# Patient Record
Sex: FEMALE | Race: BLACK OR AFRICAN AMERICAN | Employment: FULL TIME | URBAN - METROPOLITAN AREA
[De-identification: names, ages, dates, MRNs, and addresses within clinical notes are randomized per-mention and may not be internally consistent; named-entity substitution may affect disease eponyms.]

---

## 2017-03-23 ENCOUNTER — OFFICE VISIT (OUTPATIENT)
Dept: PAIN MANAGEMENT | Age: 42
End: 2017-03-23

## 2017-03-23 VITALS
WEIGHT: 280 LBS | BODY MASS INDEX: 41.35 KG/M2 | HEART RATE: 84 BPM | DIASTOLIC BLOOD PRESSURE: 92 MMHG | SYSTOLIC BLOOD PRESSURE: 163 MMHG

## 2017-03-23 DIAGNOSIS — M54.81 CERVICO-OCCIPITAL NEURALGIA: ICD-10-CM

## 2017-03-23 DIAGNOSIS — M25.50 POLYARTHRALGIA: Primary | ICD-10-CM

## 2017-03-23 DIAGNOSIS — M25.50 POLYARTHRALGIA: ICD-10-CM

## 2017-03-23 DIAGNOSIS — M15.9 GENERALIZED OA: ICD-10-CM

## 2017-03-23 DIAGNOSIS — F51.04 CHRONIC INSOMNIA: ICD-10-CM

## 2017-03-23 DIAGNOSIS — E11.69 DIABETES MELLITUS TYPE 2 IN OBESE (HCC): ICD-10-CM

## 2017-03-23 DIAGNOSIS — Z79.899 ENCOUNTER FOR LONG-TERM (CURRENT) USE OF MEDICATIONS: ICD-10-CM

## 2017-03-23 DIAGNOSIS — G89.21 CHRONIC PAIN DUE TO TRAUMA: ICD-10-CM

## 2017-03-23 DIAGNOSIS — E66.9 DIABETES MELLITUS TYPE 2 IN OBESE (HCC): ICD-10-CM

## 2017-03-23 DIAGNOSIS — M79.2 NEUROPATHIC PAIN: ICD-10-CM

## 2017-03-23 LAB
ALCOHOL UR POC: NORMAL
AMPHETAMINES UR POC: NEGATIVE
BARBITURATES UR POC: NEGATIVE
BENZODIAZEPINES UR POC: NEGATIVE
BUPRENORPHINE UR POC: NORMAL
CANNABINOIDS UR POC: NEGATIVE
CARISOPRODOL UR POC: NORMAL
COCAINE UR POC: NEGATIVE
FENTANYL UR POC: NORMAL
MDMA/ECSTASY UR POC: NEGATIVE
METHADONE UR POC: NEGATIVE
METHAMPHETAMINE UR POC: NEGATIVE
METHYLPHENIDATE UR POC: NEGATIVE
OPIATES UR POC: NEGATIVE
OXYCODONE UR POC: NEGATIVE
PHENCYCLIDINE UR POC: NEGATIVE
PROPOXYPHENE UR POC: NORMAL
TRAMADOL UR POC: NORMAL
TRICYCLICS UR POC: NEGATIVE

## 2017-03-23 RX ORDER — DIPHENHYDRAMINE HCL 25 MG
25 CAPSULE ORAL
COMMUNITY
End: 2018-11-08

## 2017-03-23 RX ORDER — TOPIRAMATE 100 MG/1
TABLET, FILM COATED ORAL 2 TIMES DAILY
COMMUNITY
End: 2021-10-30

## 2017-03-23 RX ORDER — DICYCLOMINE HYDROCHLORIDE 20 MG/1
20 TABLET ORAL EVERY 8 HOURS
COMMUNITY
End: 2019-12-12

## 2017-03-23 RX ORDER — VENLAFAXINE HYDROCHLORIDE 37.5 MG/1
CAPSULE, EXTENDED RELEASE ORAL
Qty: 21 CAP | Refills: 0 | Status: SHIPPED | OUTPATIENT
Start: 2017-03-23 | End: 2018-11-08

## 2017-03-23 RX ORDER — PROMETHAZINE HYDROCHLORIDE 25 MG/1
25 TABLET ORAL
COMMUNITY
End: 2019-05-22 | Stop reason: ALTCHOICE

## 2017-03-23 RX ORDER — IBUPROFEN 800 MG/1
TABLET ORAL
COMMUNITY
End: 2018-11-08

## 2017-03-23 RX ORDER — HYDROCHLOROTHIAZIDE 25 MG/1
25 TABLET ORAL DAILY
COMMUNITY
End: 2019-08-28

## 2017-03-23 RX ORDER — GLIMEPIRIDE 4 MG/1
4 TABLET ORAL
COMMUNITY
End: 2019-08-28

## 2017-03-23 RX ORDER — ACETAMINOPHEN 500 MG
500 TABLET ORAL
COMMUNITY
End: 2019-09-12

## 2017-03-23 RX ORDER — VENLAFAXINE HYDROCHLORIDE 150 MG/1
150 CAPSULE, EXTENDED RELEASE ORAL DAILY
Qty: 30 CAP | Refills: 5 | Status: SHIPPED | OUTPATIENT
Start: 2017-03-23 | End: 2017-04-22
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
## 2017-05-16 ENCOUNTER — OFFICE VISIT (OUTPATIENT)
Dept: PAIN MANAGEMENT | Age: 42
End: 2017-05-16

## 2017-05-16 VITALS
BODY MASS INDEX: 41.47 KG/M2 | HEIGHT: 69 IN | WEIGHT: 280 LBS | SYSTOLIC BLOOD PRESSURE: 142 MMHG | DIASTOLIC BLOOD PRESSURE: 88 MMHG | HEART RATE: 102 BPM

## 2017-05-16 DIAGNOSIS — Z71.89 COUNSELING AND COORDINATION OF CARE: Primary | ICD-10-CM

## 2017-05-16 DIAGNOSIS — G89.21 CHRONIC PAIN DUE TO TRAUMA: ICD-10-CM

## 2017-05-16 DIAGNOSIS — M25.50 POLYARTHRALGIA: ICD-10-CM

## 2017-05-16 DIAGNOSIS — E66.9 DIABETES MELLITUS TYPE 2 IN OBESE (HCC): ICD-10-CM

## 2017-05-16 DIAGNOSIS — M79.7 FIBROMYALGIA: Primary | ICD-10-CM

## 2017-05-16 DIAGNOSIS — Z79.899 ENCOUNTER FOR LONG-TERM (CURRENT) USE OF MEDICATIONS: ICD-10-CM

## 2017-05-16 DIAGNOSIS — E11.69 DIABETES MELLITUS TYPE 2 IN OBESE (HCC): ICD-10-CM

## 2017-05-16 DIAGNOSIS — M15.9 GENERALIZED OA: ICD-10-CM

## 2017-05-16 DIAGNOSIS — M79.2 NEUROPATHIC PAIN: ICD-10-CM

## 2017-05-16 RX ORDER — HYDROCODONE BITARTRATE AND ACETAMINOPHEN 10; 325 MG/1; MG/1
1 TABLET ORAL 2 TIMES DAILY
Qty: 60 TAB | Refills: 0 | Status: SHIPPED | OUTPATIENT
Start: 2017-06-14 | End: 2017-07-14 | Stop reason: SDUPTHER

## 2017-05-16 RX ORDER — HYDROCODONE BITARTRATE AND ACETAMINOPHEN 10; 325 MG/1; MG/1
1 TABLET ORAL 2 TIMES DAILY
Qty: 60 TAB | Refills: 0 | Status: SHIPPED | OUTPATIENT
Start: 2017-05-16 | End: 2017-05-16 | Stop reason: SDUPTHER

## 2017-05-16 RX ORDER — NORTRIPTYLINE HYDROCHLORIDE 50 MG/1
100 CAPSULE ORAL
Qty: 60 CAP | Refills: 5 | Status: SHIPPED | OUTPATIENT
Start: 2017-05-16 | End: 2017-09-11 | Stop reason: SDUPTHER

## 2017-05-16 NOTE — MR AVS SNAPSHOT
Visit Information Date & Time Provider Department Dept. Phone Encounter #  
 5/16/2017 11:20 AM Janith Councilman [address] [place] [address] for Pain Management  [id number] Follow-up Instructions Return in about 2 months (around 7/16/2017). Follow-up and Disposition History Upcoming Health Maintenance Date Due HEMOGLOBIN A1C Q6M 1973 FOOT EXAM Q1 6/13/1983 MICROALBUMIN Q1 6/13/1983 EYE EXAM RETINAL OR DILATED Q1 6/13/1983 Pneumococcal 19-64 Medium Risk (1 of 1 - PPSV23) 6/13/1992 DTaP/Tdap/Td series (1 - Tdap) 6/13/1994 LIPID PANEL Q1 3/27/2014 PAP AKA CERVICAL CYTOLOGY 4/3/2016 INFLUENZA AGE 9 TO ADULT 8/1/2017 Allergies as of 5/16/2017  Review Complete On: 5/16/2017 By: Aries Yo LPN Severity Noted Reaction Type Reactions Aspirin Medium 10/05/2016   Systemic Other (comments) Chest pain Darvocet A500 [Propoxyphene N-acetaminophen] Medium 10/05/2016   Side Effect Hives Pcn [Penicillins] Medium 10/05/2016   Side Effect Hives Sulfur Medium 10/05/2016   Side Effect Hives Current Immunizations  Never Reviewed No immunizations on file. Not reviewed this visit You Were Diagnosed With   
  
 Codes Comments Fibromyalgia    -  Primary ICD-10-CM: M79.7 ICD-9-CM: 729.1 Neuropathic pain     ICD-10-CM: M79.2 ICD-9-CM: 729.2 Encounter for long-term (current) use of medications     ICD-10-CM: Z79.899 ICD-9-CM: V58.69 Polyarthralgia     ICD-10-CM: M25.50 ICD-9-CM: 719.49 Chronic migraine     ICD-10-CM: J53.737 ICD-9-CM: 346.70 Generalized OA     ICD-10-CM: M15.9 ICD-9-CM: 715.00 Diabetes mellitus type 2 in obese (HCC)     ICD-10-CM: E11.9, E66.9 ICD-9-CM: 250.00, 278.00 Chronic pain due to trauma     ICD-10-CM: G89.21 ICD-9-CM: 338.21 Vitals BP Pulse Height(growth percentile) Weight(growth percentile) BMI OB Status 142/88 (!) 102 5' 9\" (1.753 m) 280 lb (127 kg) 41.35 kg/m2 Having regular periods Smoking Status Former Smoker Vitals History BMI and BSA Data Body Mass Index Body Surface Area  
 41.35 kg/m 2 2.49 m 2 Preferred Pharmacy Pharmacy Name Phone [facility] [address], [address] [place] [address] Avenue [phone] Your Updated Medication List  
  
   
This list is accurate as of: 5/16/17  1:04 PM.  Always use your most recent med list.  
  
  
  
  
 BENADRYL 25 mg capsule Generic drug:  diphenhydrAMINE Take 25 mg by mouth every six (6) hours as needed. BENTYL 20 mg tablet Generic drug:  dicyclomine Take 20 mg by mouth every six (6) hours. glimepiride 4 mg tablet Commonly known as:  AMARYL Take  by mouth every morning. hydroCHLOROthiazide 25 mg tablet Commonly known as:  HYDRODIURIL Take 25 mg by mouth daily. HYDROcodone-acetaminophen  mg tablet Commonly known as:  Ceil Heap Take 1 Tab by mouth two (2) times a day. Max Daily Amount: 2 Tabs. For severe pain flares Start taking on:  6/14/2017  
  
 ibuprofen 800 mg tablet Commonly known as:  MOTRIN Take  by mouth. nortriptyline 50 mg capsule Commonly known as:  PAMELOR Take 2 Caps by mouth nightly. Start with 1 cap qhs for migraines. increase to 2 qhs after 7 days if needed  
  
 promethazine 25 mg tablet Commonly known as:  PHENERGAN Take 25 mg by mouth every six (6) hours as needed for Nausea. topiramate 100 mg tablet Commonly known as:  TOPAMAX Take  by mouth two (2) times a day. TYLENOL EXTRA STRENGTH 500 mg tablet Generic drug:  acetaminophen Take  by mouth every six (6) hours as needed for Pain. venlafaxine-SR 37.5 mg capsule Commonly known as:  EFFEXOR-XR  
1 po qpm x 7 days, then 2 po qpm  
  
  
  
  
Prescriptions Printed Refills  HYDROcodone-acetaminophen (NORCO)  mg tablet 0  
 Starting on: 6/14/2017 Sig: Take 1 Tab by mouth two (2) times a day. Max Daily Amount: 2 Tabs. For severe pain flares Class: Print Route: Oral  
 nortriptyline (PAMELOR) 50 mg capsule 5 Sig: Take 2 Caps by mouth nightly. Start with 1 cap qhs for migraines. increase to 2 qhs after 7 days if needed Class: Print Route: Oral  
  
Follow-up Instructions Return in about 2 months (around 7/16/2017). Patient Instructions Plan: We will prescribe Norco 10/325 for SEVERE pain flares--MAX of two tablets per day as needed Nortriptyline 50 mg will be written for sleep and migraines. Start with 50 mg nightly for 7 days, then increase to 100 mg after if needed Poor, fragmented night sleep is a universal symptom that accompanies many chronic pain syndromes, including fibromyalgia. This is made much WORSE by daytime napping (anything longer than 20-30 minutes), as your circadian sleep/wake rhythms become dysregulated. However, daytime fatigue can be overwhelming, and it is often difficult to eliminate napping during the day. Several studies have shown improvement in fatigue with targeted, brief daytime naps. These are most effective if you follow these steps: 
1) drink a small amount of caffeine immediately before taking your nap (yes, BEFORE), such as a small cup of coffee or a cup of caffeinated tea. 2) after drinking the coffee, lay down in a comfortable spot and nap for 20 minutes (no longer than 30 minutes!). If you don't fall asleep, that is ok. Relaxing comfortably with eyes closed is acceptable. 3) get out of bed. Do not sleep longer than 20-30 minutes. The best time to nap is between noon and 2 pm, and do not nap after 3 pm if possible. Also, remember to keep very regular sleep times and wake-up times every single day. Patients that suffer chronic insomnia with daytime fatigue are very sensitive to erratic sleep times. Follow up in 3 months or sooner if needed Regular exercise and attention to emotional health and diet remain the most effective ways to treat chronic pain of all kinds You may contact me with questions or concerns through [address] [facility]! Dear Sherwin Goodman: Thank you for requesting a [employer] account. Our records indicate that you already have an active Fan[employer] account. You can access your account anytime at [email or web address] Did you know that you can access your hospital and ER discharge instructions at any time in Fan[employer]? You can also review all of your test results from your hospital stay or ER visit. Additional Information If you have questions, please visit the Frequently Asked Questions section of the [employer] website at [email or web address] Remember, [employer] is NOT to be used for urgent needs. For medical emergencies, dial 911. Now available from your iPhone and Android! Please provide this summary of care documentation to your next provider. Your primary care clinician is listed as Mac Ross. If you have any questions after today's visit, please call [phone].

## 2017-05-16 NOTE — PROGRESS NOTES
HISTORY OF PRESENT ILLNESS  Connie Bethea is a 35 y.o. female. Patient presents for follow up of chronic pain due to fibromyalgia and  She has numerous psychosocial stressors that are worsening her symptoms. These include the fact that she has custody of two grandchildren that she is raising, three mentally ill grown children who also live in her home (Bipolar, ODD, ADHD, etc). They work rarely and do not contribute financially to her bills. She suffers with chronic anxiety and panic disorder but does not have access to health care because she is uninsured and cannot afford insurance because she qualifies for Medicaid but [place] has refused to expand medicaid in [employer]. Chronic bilateral knee pain has been severe. X-rays in 2014 were normal. She had cortisone injection and PT years ago, but she states that these were of no benefit. She endorses poor sleep, profound daytime fatigue, chronic migraines, chronic abdominal pain, and depression. We discussed the pathophysiology of fibromyalgia at great length, including evidence-based treatments available. Our options are limited due to the fact that she remains uninsured. We also discussed the limits and risks of using opioids to treat chronic pain, particularly fibromyalgia. She has tried and failed Cymbalta, Lyrica, Gabapentin, Elavil, and NSAIDs. She took Effexor for a month, and reports no benefit for pain or anxiety. She understands that there is very little we have to offer her if lifestyle issues are not addressed  She has poorly controlled diabetes, but is seen at the [facility]. She takes Glimepiride 4 mg daily. Her blood sugars average 200-300. She was counseled to see them ASAP to have her medications adjusted. We discussed treatment options at length--see treatment plan below. A total of 65 minutes was spent with the patient of which more than 50% of the time was spent counseling the patient.      HPI--see above    ROS  Constitutional: Positive for malaise/fatigue. Gastrointestinal: Positive for constipation, heartburn and nausea. Musculoskeletal: Positive for back pain, joint pain (polyarticular), myalgias and neck pain. Neurological: Positive for weakness (generalized). Psychiatric/Behavioral: The patient has insomnia. All other systems reviewed and are negative. Physical Exam  Constitutional: She is oriented to person, place, and time. She appears cachectic. She has a sickly appearance. She appears distressed. HENT:   Head: Normocephalic and atraumatic. Right Ear: External ear normal.   Left Ear: External ear normal.   Nose: Nose normal.   Mouth/Throat: Oropharynx is clear and moist. No oropharyngeal exudate. Eyes: Conjunctivae and EOM are normal. Pupils are equal, round, and reactive to light. Right eye exhibits no discharge. Left eye exhibits no discharge. No scleral icterus. Neck: Muscular tenderness: spasm. Decreased range of motion present. No Brudzinski's sign and no Kernig's sign noted. No thyromegaly present. Cardiovascular: Normal rate, regular rhythm and normal heart sounds. Pulmonary/Chest: Effort normal and breath sounds normal. No respiratory distress. She has no wheezes. She has no rales. Abdominal: Soft. She exhibits no distension. There is no tenderness. There is no rebound and no guarding. Musculoskeletal: She exhibits tenderness. Right shoulder: She exhibits decreased range of motion and tenderness. Left shoulder: She exhibits decreased range of motion and tenderness. Right elbow: She exhibits decreased range of motion. Tenderness found. Left elbow: She exhibits decreased range of motion. Tenderness found. Right wrist: She exhibits decreased range of motion and tenderness. Left wrist: She exhibits decreased range of motion and tenderness. Right hip: She exhibits decreased range of motion and tenderness.         Left hip: She exhibits decreased range of motion and tenderness. Right knee: She exhibits decreased range of motion. Tenderness found. Left knee: She exhibits decreased range of motion. Tenderness found. Right ankle: She exhibits decreased range of motion. Tenderness. Left ankle: She exhibits decreased range of motion. Tenderness. Lumbar back: She exhibits decreased range of motion, tenderness, pain and spasm. Neurological: She is alert and oriented to person, place, and time. She has normal reflexes. No cranial nerve deficit or sensory deficit. She exhibits normal muscle tone. Gait abnormal. Coordination normal.   Reflex Scores:       Tricep reflexes are 2+ on the right side and 2+ on the left side. Bicep reflexes are 2+ on the right side and 2+ on the left side. Brachioradialis reflexes are 2+ on the right side and 2+ on the left side. Patellar reflexes are 2+ on the right side and 2+ on the left side. Achilles reflexes are 2+ on the right side and 2+ on the left side. Skin: Skin is warm. No rash noted. Psychiatric: Her speech is normal and behavior is normal. Judgment and thought content normal. She exhibits a depressed mood. ASSESSMENT and PLAN    ICD-10-CM ICD-9-CM    1. Fibromyalgia M79.7 729.1    2. Neuropathic pain M79.2 729.2    3. Encounter for long-term (current) use of medications Z79.899 V58.69    4. Polyarthralgia M25.50 719.49    5. Chronic migraine G43.709 346.70    6. Generalized OA M15.9 715.00    7. Diabetes mellitus type 2 in obese (HCC) E11.9 250.00     E66.9 278.00    8. Chronic pain due to trauma G89.21 338.21       Plan: We will prescribe Norco 10/325 for SEVERE pain flares--MAX of two tablets per day as needed  Nortriptyline 50 mg will be written for sleep and migraines.  Start with 50 mg nightly for 7 days, then increase to 100 mg after if needed  Poor, fragmented night sleep is a universal symptom that accompanies many chronic pain syndromes, including fibromyalgia. This is made much WORSE by daytime napping (anything longer than 20-30 minutes), as your circadian sleep/wake rhythms become dysregulated. However, daytime fatigue can be overwhelming, and it is often difficult to eliminate napping during the day. Several studies have shown improvement in fatigue with targeted, brief daytime naps. These are most effective if you follow these steps:  1) drink a small amount of caffeine immediately before taking your nap (yes, BEFORE), such as a small cup of coffee or a cup of caffeinated tea. 2) after drinking the coffee, lay down in a comfortable spot and nap for 20 minutes (no longer than 30 minutes!). If you don't fall asleep, that is ok. Relaxing comfortably with eyes closed is acceptable. 3) get out of bed. Do not sleep longer than 20-30 minutes. The best time to nap is between noon and 2 pm, and do not nap after 3 pm if possible. Also, remember to keep very regular sleep times and wake-up times every single day. Patients that suffer chronic insomnia with daytime fatigue are very sensitive to erratic sleep times.    Follow up in 3 months or sooner if needed  Regular exercise and attention to emotional health and diet remain the most effective ways to treat chronic pain of all kinds  You may contact me with questions or concerns through [address]

## 2017-05-16 NOTE — PROGRESS NOTES
Ms. Amy Ely attended the Education Class facilitated by Carla Schmidt LPN. Objectives of this class are to review practice policies, protocols and the Controlled Substances Consent and Treatment Agreement, discuss \"what if\" scenarios, introduce staff, and provide an opportunity for questions and answers. Ultimately, goals for this class are for Ms. Pennington to:    · Be educated - Learn as much as possible about her pain and related treatment, our policies and the Controlled Substances Agreement. · Be responsible - Follow the providers advice regarding treatment recommendations, medications, and prescription information. · Be confident - Better manage her pain and return to a more functional lifestyle. At least 45 minutes was spent with the patient in face-to-face contact today.

## 2017-05-16 NOTE — PATIENT INSTRUCTIONS
Plan:  We will prescribe Norco 10/325 for SEVERE pain flares--MAX of two tablets per day as needed  Nortriptyline 50 mg will be written for sleep and migraines. Start with 50 mg nightly for 7 days, then increase to 100 mg after if needed  Poor, fragmented night sleep is a universal symptom that accompanies many chronic pain syndromes, including fibromyalgia. This is made much WORSE by daytime napping (anything longer than 20-30 minutes), as your circadian sleep/wake rhythms become dysregulated. However, daytime fatigue can be overwhelming, and it is often difficult to eliminate napping during the day. Several studies have shown improvement in fatigue with targeted, brief daytime naps. These are most effective if you follow these steps:  1) drink a small amount of caffeine immediately before taking your nap (yes, BEFORE), such as a small cup of coffee or a cup of caffeinated tea. 2) after drinking the coffee, lay down in a comfortable spot and nap for 20 minutes (no longer than 30 minutes!). If you don't fall asleep, that is ok. Relaxing comfortably with eyes closed is acceptable. 3) get out of bed. Do not sleep longer than 20-30 minutes. The best time to nap is between noon and 2 pm, and do not nap after 3 pm if possible. Also, remember to keep very regular sleep times and wake-up times every single day. Patients that suffer chronic insomnia with daytime fatigue are very sensitive to erratic sleep times.    Follow up in 3 months or sooner if needed  Regular exercise and attention to emotional health and diet remain the most effective ways to treat chronic pain of all kinds  You may contact me with questions or concerns through [address]

## 2017-05-16 NOTE — PROGRESS NOTES
Nursing Notes    Patient presents to the office today in follow-up. Patient rates her pain at 8/10 on the numerical pain scale. Reviewed medications with counts as follows:    Rx Date filled Qty Dispensed Pill Count Last Dose Short   Pt is not on any controlled medications. She did stop the effexor because it was not helping                                           POC UDS was performed in office today. Any new labs or imaging since last appointment? NO    Have you been to an emergency room (ER) or urgent care clinic since your last visit? NO            Have you been hospitalized since your last visit? NO     If yes, where, when, and reason for visit? Have you seen or consulted any other health care providers outside of the [address] [place] [address]  since your last visit? NO     If yes, where, when, and reason for visit? HM deferred to pcp.

## 2017-07-14 ENCOUNTER — OFFICE VISIT (OUTPATIENT)
Dept: PAIN MANAGEMENT | Age: 42
End: 2017-07-14

## 2017-07-14 VITALS
BODY MASS INDEX: 41.35 KG/M2 | HEART RATE: 98 BPM | RESPIRATION RATE: 18 BRPM | SYSTOLIC BLOOD PRESSURE: 135 MMHG | DIASTOLIC BLOOD PRESSURE: 85 MMHG | WEIGHT: 280 LBS | TEMPERATURE: 96.9 F

## 2017-07-14 DIAGNOSIS — N83.209 HEMORRHAGIC CYST OF OVARY: ICD-10-CM

## 2017-07-14 DIAGNOSIS — Z79.899 ENCOUNTER FOR LONG-TERM (CURRENT) USE OF HIGH-RISK MEDICATION: ICD-10-CM

## 2017-07-14 DIAGNOSIS — E66.9 DIABETES MELLITUS TYPE 2 IN OBESE (HCC): ICD-10-CM

## 2017-07-14 DIAGNOSIS — M79.7 FIBROMYALGIA: ICD-10-CM

## 2017-07-14 DIAGNOSIS — E11.69 DIABETES MELLITUS TYPE 2 IN OBESE (HCC): ICD-10-CM

## 2017-07-14 DIAGNOSIS — Z79.899 ENCOUNTER FOR LONG-TERM (CURRENT) USE OF MEDICATIONS: ICD-10-CM

## 2017-07-14 DIAGNOSIS — M79.2 NEUROPATHIC PAIN: ICD-10-CM

## 2017-07-14 DIAGNOSIS — R10.32 LEFT LOWER QUADRANT PAIN: Primary | ICD-10-CM

## 2017-07-14 DIAGNOSIS — M25.50 POLYARTHRALGIA: ICD-10-CM

## 2017-07-14 DIAGNOSIS — M15.9 GENERALIZED OA: ICD-10-CM

## 2017-07-14 LAB
ALCOHOL UR POC: NORMAL
AMPHETAMINES UR POC: NEGATIVE
BARBITURATES UR POC: NEGATIVE
BENZODIAZEPINES UR POC: NEGATIVE
BUPRENORPHINE UR POC: NEGATIVE
CANNABINOIDS UR POC: NEGATIVE
CARISOPRODOL UR POC: NORMAL
COCAINE UR POC: NEGATIVE
FENTANYL UR POC: NORMAL
MDMA/ECSTASY UR POC: NEGATIVE
METHADONE UR POC: NEGATIVE
METHAMPHETAMINE UR POC: NEGATIVE
METHYLPHENIDATE UR POC: NEGATIVE
OPIATES UR POC: NORMAL
OXYCODONE UR POC: NEGATIVE
PHENCYCLIDINE UR POC: NORMAL
PROPOXYPHENE UR POC: NORMAL
TRAMADOL UR POC: NORMAL
TRICYCLICS UR POC: NEGATIVE

## 2017-07-14 RX ORDER — HYDROCODONE BITARTRATE AND ACETAMINOPHEN 10; 325 MG/1; MG/1
1 TABLET ORAL 2 TIMES DAILY
Qty: 60 TAB | Refills: 0 | Status: SHIPPED | OUTPATIENT
Start: 2017-08-12 | End: 2017-09-11 | Stop reason: SDUPTHER

## 2017-07-14 RX ORDER — INSULIN LISPRO 100 [IU]/ML
40 INJECTION, SUSPENSION SUBCUTANEOUS 2 TIMES DAILY
COMMUNITY
End: 2019-05-22 | Stop reason: ALTCHOICE

## 2017-07-14 RX ORDER — HYDROCODONE BITARTRATE AND ACETAMINOPHEN 10; 325 MG/1; MG/1
1 TABLET ORAL 2 TIMES DAILY
Qty: 60 TAB | Refills: 0 | Status: SHIPPED | OUTPATIENT
Start: 2017-07-14 | End: 2017-07-14 | Stop reason: SDUPTHER

## 2017-07-14 NOTE — MR AVS SNAPSHOT
Visit Information Date & Time Provider Department Dept. Phone Encounter #  
 7/14/2017  9:00 AM Parvin Zacarias [facility] [phone] [id number] Follow-up Instructions Return in about 2 months (around 9/14/2017). Upcoming Health Maintenance Date Due HEMOGLOBIN A1C Q6M 1973 FOOT EXAM Q1 6/13/1983 MICROALBUMIN Q1 6/13/1983 EYE EXAM RETINAL OR DILATED Q1 6/13/1983 Pneumococcal 19-64 Medium Risk (1 of 1 - PPSV23) 6/13/1992 DTaP/Tdap/Td series (1 - Tdap) 6/13/1994 LIPID PANEL Q1 3/27/2014 PAP AKA CERVICAL CYTOLOGY 4/3/2016 INFLUENZA AGE 9 TO ADULT 8/1/2017 Allergies as of 7/14/2017  Review Complete On: 7/14/2017 By: Cristopher Cagle RN Severity Noted Reaction Type Reactions Aspirin Medium 10/05/2016   Systemic Other (comments) Chest pain Darvocet A500 [Propoxyphene N-acetaminophen] Medium 10/05/2016   Side Effect Hives Pcn [Penicillins] Medium 10/05/2016   Side Effect Hives Sulfur Medium 10/05/2016   Side Effect Hives Current Immunizations  Never Reviewed No immunizations on file. Not reviewed this visit You Were Diagnosed With   
  
 Codes Comments Encounter for long-term (current) use of high-risk medication    -  Primary ICD-10-CM: H19.974 ICD-9-CM: V58.69 Fibromyalgia     ICD-10-CM: M79.7 ICD-9-CM: 729.1 Neuropathic pain     ICD-10-CM: M79.2 ICD-9-CM: 729.2 Encounter for long-term (current) use of medications     ICD-10-CM: Z79.899 ICD-9-CM: V58.69 Polyarthralgia     ICD-10-CM: M25.50 ICD-9-CM: 719.49 Chronic migraine     ICD-10-CM: R57.356 ICD-9-CM: 346.70 Generalized OA     ICD-10-CM: M15.9 ICD-9-CM: 715.00 Diabetes mellitus type 2 in obese (HCC)     ICD-10-CM: E11.9, E66.9 ICD-9-CM: 250.00, 278.00 Hemorrhagic cyst of ovary     ICD-10-CM: N83.209 ICD-9-CM: 620.2 Vitals BP Pulse Temp Resp Weight(growth percentile) LMP  
 135/85 98 96.9 °F (36.1 °C) 18 280 lb (127 kg) 07/03/2017 BMI OB Status Smoking Status 41.35 kg/m2 Having regular periods Former Smoker Vitals History BMI and BSA Data Body Mass Index Body Surface Area  
 41.35 kg/m 2 2.49 m 2 Preferred Pharmacy Pharmacy Name Phone [facility] [address] [place] [address] [phone] Your Updated Medication List  
  
   
This list is accurate as of: 7/14/17  9:46 AM.  Always use your most recent med list.  
  
  
  
  
 BENADRYL 25 mg capsule Generic drug:  diphenhydrAMINE Take 25 mg by mouth every six (6) hours as needed. BENTYL 20 mg tablet Generic drug:  dicyclomine Take 20 mg by mouth every six (6) hours. glimepiride 4 mg tablet Commonly known as:  AMARYL Take  by mouth every morning. HumaLOG Mix 75-25 100 unit/mL (75-25) injection Generic drug:  insulin lispro protamine/insulin lispro  
by SubCUTAneous route. hydroCHLOROthiazide 25 mg tablet Commonly known as:  HYDRODIURIL Take 25 mg by mouth daily. HYDROcodone-acetaminophen  mg tablet Commonly known as:  Darlene Bruno Take 1 Tab by mouth two (2) times a day. Max Daily Amount: 2 Tabs. For severe pain flares  
  
 ibuprofen 800 mg tablet Commonly known as:  MOTRIN Take  by mouth. nortriptyline 50 mg capsule Commonly known as:  PAMELOR Take 2 Caps by mouth nightly. Start with 1 cap qhs for migraines. increase to 2 qhs after 7 days if needed  
  
 promethazine 25 mg tablet Commonly known as:  PHENERGAN Take 25 mg by mouth every six (6) hours as needed for Nausea. topiramate 100 mg tablet Commonly known as:  TOPAMAX Take  by mouth two (2) times a day. TYLENOL EXTRA STRENGTH 500 mg tablet Generic drug:  acetaminophen Take  by mouth every six (6) hours as needed for Pain. venlafaxine-SR 37.5 mg capsule Commonly known as:  EFFEXOR-XR  
1 po qpm x 7 days, then 2 po qpm  
  
  
  
  
We Performed the Following AMB POC DRUG SCREEN () [ [facility]] DRUG SCREEN [[id number] Custom] Follow-up Instructions Return in about 2 months (around 9/14/2017). Patient Instructions Plan: 
Continue same medications as prescribed for chronic pain Follow up in 3 months or sooner if needed Regular exercise and attention to emotional health and diet remain the most effective ways to treat chronic pain of all kinds You may contact me with questions or concerns through [address] Introducing [facility]! Dear Juvencio Camilo: Thank you for requesting a [employer] account. Our records indicate that you already have an active Caviar account. You can access your account anytime at [email or web address] Did you know that you can access your hospital and ER discharge instructions at any time in [employer]iar? You can also review all of your test results from your hospital stay or ER visit. Additional Information If you have questions, please visit the Frequently Asked Questions section of the [employer]iar website at [email or web address] Remember, [employer]iar is NOT to be used for urgent needs. For medical emergencies, dial 911. Now available from your iPhone and Android! Please provide this summary of care documentation to your next provider. Your primary care clinician is listed as Chela Gallegos. If you have any questions after today's visit, please call [phone].

## 2017-07-14 NOTE — PROGRESS NOTES
HISTORY OF PRESENT ILLNESS  Christine Torres is a 38 y.o. female. Patient presents for follow up of chronic, severe widespread pain due to fibromyalgia, chronic low back pain, and migraines. She reports that her migraines have improved significantly since the addition of Pamelor and Norco, as has fibromyalgia and back pain. She takes 100 mg Pamelor qhs now with Norco for severe pain flares. She has not had to go to the ER in nearly two months for severe migraine, as her headache frequency has been reduced from 20+ headache days per month to less than 10 (50% drop). However, she did have to go to the ER last month due to severe LLQ abdominal pain. They discovered a small intramural uterine fibroid and a small hemorrhagic cyst in the left ovary (the likely generator of her pain). Her pain has slowly returned to baseline. The remainder of her pain generators remain constant but stable since her last visit. She is now engaged in care with a health clinic, which has recently intitated insulin therapy to manage her Type II Diabetes. Her average blood glucose has dropped from the 200s to low 100s, and she has adopted a healthier diet and is exercising more often. She has seen improvements in her energy levels and fatigue as a result. She has been referred to a GI clinic to work up her abdominal pain and digestive complaints. Medications continue to work well for pain control overall. Christine Torres is tolerating medications well, with no untoward side effects noted. She is able to stay more active with less discomfort with these current doses. The patient reports an average of 50-60% relief with this regimen. Most recent UDS and  were consistent with prescribed medications. Pill counts are appropriate.  she is informed of side effects, risks, and benefits of this regimen, and emphasizes that she derives a significant improvement in functionality and quality of life, and notes that non-opioid medications and therapies in the past have not offered significant benefit. She denies new or worsening insomnia or constipation issues. She denies any falls, injuries, or hospitalizations since the last visit. A total of 45 minutes was spent with the patient of which more than 50% of the time was spent counseling the patient. HPI--see above    ROS  Constitutional: Positive for malaise/fatigue. Gastrointestinal: Positive for constipation, heartburn and nausea. Musculoskeletal: Positive for back pain, joint pain (polyarticular), myalgias and neck pain. Neurological: Positive for weakness (generalized). Psychiatric/Behavioral: The patient has insomnia. All other systems reviewed and are negative. Physical Exam  Constitutional: She is oriented to person, place, and time. She is in better spirits today  HENT:   Head: Normocephalic and atraumatic. Neck: Muscular tenderness: spasm. Decreased range of motion present. No Brudzinski's sign and no Kernig's sign noted. No thyromegaly present. Cardiovascular: Normal rate, regular rhythm and normal heart sounds. Pulmonary/Chest: Effort normal and breath sounds normal. No respiratory distress. She has no wheezes. She has no rales. Abdominal: Soft. She exhibits no distension. Mild tenderness at LLQ without guarding or rebound  Musculoskeletal: She exhibits tenderness. 16/18 points tender to palpation       Right shoulder: She exhibits decreased range of motion and tenderness. Left shoulder: She exhibits decreased range of motion and tenderness. Right hip: She exhibits decreased range of motion and tenderness. Left hip: She exhibits decreased range of motion and tenderness. Right knee: She exhibits decreased range of motion. Tenderness found. Left knee: She exhibits decreased range of motion. Tenderness found. Right ankle: She exhibits decreased range of motion. Tenderness.         Left ankle: She exhibits decreased range of motion. Tenderness. Lumbar back: She exhibits decreased range of motion, tenderness, pain and spasm. Neurological: She is alert and oriented to person, place, and time. She has normal reflexes. No cranial nerve deficit or sensory deficit. She exhibits normal muscle tone. Gait abnormal. Coordination normal.   Skin: Skin is warm. No rash noted. multiple pedunculated hyperpigmented tags around eyes and cheeks  Psychiatric: Her speech is normal and behavior is normal. Judgment and thought content normal. She exhibits a depressed mood. ASSESSMENT and PLAN  Encounter Diagnoses     ICD-10-CM ICD-9-CM   1. Left lower quadrant pain R10.32 789.04   2. Encounter for long-term (current) use of high-risk medication Z79.899 V58.69   3. Fibromyalgia M79.7 729.1   4. Neuropathic pain M79.2 729.2   5. Encounter for long-term (current) use of medications Z79.899 V58.69   6. Polyarthralgia M25.50 719.49   7. Chronic migraine G43.709 346.70   8. Generalized OA M15.9 715.00   9. Diabetes mellitus type 2 in obese (HCC) E11.9 250.00    E66.9 278.00   10.  Hemorrhagic cyst of ovary N83.209 620.2      Plan:  Continue same medications as prescribed for chronic pain  Follow up in 2 months or sooner if needed  Regular exercise and attention to emotional health and diet remain the most effective ways to treat chronic pain of all kinds  You may contact me with questions or concerns through [address]

## 2017-07-14 NOTE — PROGRESS NOTES
Nursing Notes    Patient presents to the office today in follow-up. Patient rates her pain at 5/10 on the numerical pain scale. Reviewed medications with counts as follows:    Rx Date filled Qty Dispensed Pill Count Last Dose Short   norco 10 5/16/17 60 9 7/13/17 no         Comments: pt still has script for norco to fill 6/14/17. POC UDS was performed in office today    Any new labs or imaging since last appointment? YES, blood work for diabetic check, insulin now, abd US for abdominal pain, toradol injection given at ED    Have you been to an emergency room (ER) or urgent care clinic since your last visit? YES            Have you been hospitalized since your last visit? NO     If yes, where, when, and reason for visit? Have you seen or consulted any other health care providers outside of the [address] [place] [address]  since your last visit? YES     If yes, where, when, and reason [facility] visit[facility]    Ms. Tania Panda has a reminder for a \"due or due soon\" health maintenance. I have asked that she contact her primary care provider for follow-up on this health maintenance.

## 2017-09-11 ENCOUNTER — OFFICE VISIT (OUTPATIENT)
Dept: PAIN MANAGEMENT | Age: 42
End: 2017-09-11

## 2017-09-11 VITALS
SYSTOLIC BLOOD PRESSURE: 141 MMHG | WEIGHT: 280 LBS | TEMPERATURE: 98.8 F | BODY MASS INDEX: 41.35 KG/M2 | HEART RATE: 120 BPM | RESPIRATION RATE: 20 BRPM | DIASTOLIC BLOOD PRESSURE: 91 MMHG

## 2017-09-11 DIAGNOSIS — G56.03 BILATERAL CARPAL TUNNEL SYNDROME: Primary | ICD-10-CM

## 2017-09-11 DIAGNOSIS — Z79.899 ENCOUNTER FOR LONG-TERM (CURRENT) USE OF HIGH-RISK MEDICATION: ICD-10-CM

## 2017-09-11 DIAGNOSIS — F51.04 CHRONIC INSOMNIA: ICD-10-CM

## 2017-09-11 DIAGNOSIS — M15.9 GENERALIZED OA: ICD-10-CM

## 2017-09-11 DIAGNOSIS — M25.50 POLYARTHRALGIA: ICD-10-CM

## 2017-09-11 DIAGNOSIS — R10.32 LEFT LOWER QUADRANT PAIN: ICD-10-CM

## 2017-09-11 DIAGNOSIS — Z79.899 ENCOUNTER FOR LONG-TERM (CURRENT) USE OF MEDICATIONS: ICD-10-CM

## 2017-09-11 DIAGNOSIS — M79.7 FIBROMYALGIA: ICD-10-CM

## 2017-09-11 DIAGNOSIS — M79.2 NEUROPATHIC PAIN: ICD-10-CM

## 2017-09-11 RX ORDER — NORTRIPTYLINE HYDROCHLORIDE 50 MG/1
100 CAPSULE ORAL
Qty: 60 CAP | Refills: 5 | Status: SHIPPED | OUTPATIENT
Start: 2017-09-11 | End: 2019-05-13

## 2017-09-11 RX ORDER — HYDROCODONE BITARTRATE AND ACETAMINOPHEN 10; 325 MG/1; MG/1
1 TABLET ORAL 2 TIMES DAILY
Qty: 60 TAB | Refills: 0 | Status: SHIPPED | OUTPATIENT
Start: 2017-10-10 | End: 2017-09-11 | Stop reason: SDUPTHER

## 2017-09-11 RX ORDER — HYDROCODONE BITARTRATE AND ACETAMINOPHEN 10; 325 MG/1; MG/1
1 TABLET ORAL 2 TIMES DAILY
Qty: 60 TAB | Refills: 0 | Status: SHIPPED | OUTPATIENT
Start: 2017-11-08 | End: 2018-01-03 | Stop reason: SDUPTHER

## 2017-09-11 NOTE — PATIENT INSTRUCTIONS
Plan:  Continue same medications as prescribed for chronic pain  Your symptoms are suspicious for bilateral carpal tunnel syndrome.  Consider trying bilateral wrist braces (can be purchased at [employer]/etc) as well as B vitamin complex (take two daily)  Follow up in 4 months or sooner if needed  Regular exercise and attention to emotional health and diet remain the most effective ways to treat chronic pain of all kinds  You may contact me with questions or concerns through [address]

## 2017-09-11 NOTE — MR AVS SNAPSHOT
Visit Information Date & Time Provider Department Dept. Phone Encounter #  
 9/11/2017  3:00 PM [facility] [phone] [id number] Follow-up Instructions Return in about 4 months (around 1/11/2018). Follow-up and Disposition History Upcoming Health Maintenance Date Due HEMOGLOBIN A1C Q6M 1973 FOOT EXAM Q1 6/13/1983 MICROALBUMIN Q1 6/13/1983 EYE EXAM RETINAL OR DILATED Q1 6/13/1983 Pneumococcal 19-64 Medium Risk (1 of 1 - PPSV23) 6/13/1992 DTaP/Tdap/Td series (1 - Tdap) 6/13/1994 LIPID PANEL Q1 3/27/2014 PAP AKA CERVICAL CYTOLOGY 4/3/2016 INFLUENZA AGE 9 TO ADULT 8/1/2017 Allergies as of 9/11/2017  Review Complete On: 9/11/2017 By: Nita Rodriguez LPN Severity Noted Reaction Type Reactions Aspirin Medium 10/05/2016   Systemic Other (comments) Chest pain Darvocet A500 [Propoxyphene N-acetaminophen] Medium 10/05/2016   Side Effect Hives Pcn [Penicillins] Medium 10/05/2016   Side Effect Hives Sulfur Medium 10/05/2016   Side Effect Hives Current Immunizations  Never Reviewed No immunizations on file. Not reviewed this visit You Were Diagnosed With   
  
 Codes Comments Bilateral carpal tunnel syndrome    -  Primary ICD-10-CM: G56.03 
ICD-9-CM: 354.0 Left lower quadrant pain     ICD-10-CM: R10.32 
ICD-9-CM: 789.04 Fibromyalgia     ICD-10-CM: M79.7 ICD-9-CM: 729.1 Encounter for long-term (current) use of medications     ICD-10-CM: Z79.899 ICD-9-CM: V58.69 Polyarthralgia     ICD-10-CM: M25.50 ICD-9-CM: 719.49 Chronic migraine     ICD-10-CM: P06.344 ICD-9-CM: 346.70 Generalized OA     ICD-10-CM: M15.9 ICD-9-CM: 715.00 Neuropathic pain     ICD-10-CM: M79.2 ICD-9-CM: 729.2 Chronic insomnia     ICD-10-CM: F51.04 
ICD-9-CM: 780.52 Vitals BP Pulse Temp Resp Weight(growth percentile) BMI (!) 141/91 (!) 120 98.8 °F (37.1 °C) 20 280 lb (127 kg) 41.35 kg/m2 OB Status Smoking Status Having regular periods Former Smoker BMI and BSA Data Body Mass Index Body Surface Area  
 41.35 kg/m 2 2.49 m 2 Preferred Pharmacy Pharmacy Name Phone [facility] [address] [place], [address] [place] [address] [phone] Your Updated Medication List  
  
   
This list is accurate as of: 9/11/17  4:03 PM.  Always use your most recent med list.  
  
  
  
  
 BENADRYL 25 mg capsule Generic drug:  diphenhydrAMINE Take 25 mg by mouth every six (6) hours as needed. BENTYL 20 mg tablet Generic drug:  dicyclomine Take 20 mg by mouth every six (6) hours. glimepiride 4 mg tablet Commonly known as:  AMARYL Take  by mouth every morning. HumaLOG Mix 75-25 100 unit/mL (75-25) injection Generic drug:  insulin lispro protamine/insulin lispro  
by SubCUTAneous route. hydroCHLOROthiazide 25 mg tablet Commonly known as:  HYDRODIURIL Take 25 mg by mouth daily. HYDROcodone-acetaminophen  mg tablet Commonly known as:  Audie Boot Take 1 Tab by mouth two (2) times a day. Max Daily Amount: 2 Tabs. For severe pain flares Start taking on:  11/8/2017  
  
 ibuprofen 800 mg tablet Commonly known as:  MOTRIN Take  by mouth. nortriptyline 50 mg capsule Commonly known as:  PAMELOR Take 2 Caps by mouth nightly. Start with 1 cap qhs for migraines. increase to 2 qhs after 7 days if needed  
  
 promethazine 25 mg tablet Commonly known as:  PHENERGAN Take 25 mg by mouth every six (6) hours as needed for Nausea. topiramate 100 mg tablet Commonly known as:  TOPAMAX Take  by mouth two (2) times a day. TYLENOL EXTRA STRENGTH 500 mg tablet Generic drug:  acetaminophen Take  by mouth every six (6) hours as needed for Pain. venlafaxine-SR 37.5 mg capsule Commonly known as:  EFFEXOR-XR  
 1 po qpm x 7 days, then 2 po qpm  
  
  
  
  
Prescriptions Printed Refills  
 nortriptyline (PAMELOR) 50 mg capsule 5 Sig: Take 2 Caps by mouth nightly. Start with 1 cap qhs for migraines. increase to 2 qhs after 7 days if needed Class: Print Route: Oral  
 HYDROcodone-acetaminophen (NORCO)  mg tablet 0 Starting on: 11/8/2017 Sig: Take 1 Tab by mouth two (2) times a day. Max Daily Amount: 2 Tabs. For severe pain flares Class: Print Route: Oral  
  
Follow-up Instructions Return in about 4 months (around 1/11/2018). Patient Instructions Plan: 
Continue same medications as prescribed for chronic pain Your symptoms are suspicious for bilateral carpal tunnel syndrome. Consider trying bilateral wrist braces (can be purchased at [employer]/etc) as well as B vitamin complex (take two daily) Follow up in 4 months or sooner if needed Regular exercise and attention to emotional health and diet remain the most effective ways to treat chronic pain of all kinds You may contact me with questions or concerns through [address] [facility]! Dear Lori Rao: Thank you for requesting a [employer] account. Our records indicate that you already have an active [employer] account. You can access your account anytime at [email or web address] Did you know that you can access your hospital and ER discharge instructions at any time in [employer]? You can also review all of your test results from your hospital stay or ER visit. Additional Information If you have questions, please visit the Frequently Asked Questions section of the [employer] website at [email or web address] Remember, [employer] is NOT to be used for urgent needs. For medical emergencies, dial 911. Now available from your iPhone and Android! Please provide this summary of care documentation to your next provider. Your primary care clinician is listed as [facility]. If you have any questions after today's visit, please call [phone].

## 2017-09-11 NOTE — PROGRESS NOTES
Nursing Notes    Patient presents to the office today in follow-up. Patient rates her pain at 6/10 on the numerical pain scale. Reviewed medications with counts as follows:    Rx Date filled Qty Dispensed Pill Count Last Dose Short   norco 10 mg 07/27/17 60 15.5 This am  no       Comments:  Patient is here today for a follow up appt today she states her pain level today is a 6     POC UDS was performed in office today    Any new labs or imaging since last appointment? NO    Have you been to an emergency room (ER) or urgent care clinic since your last visit? NO            Have you been hospitalized since your last visit? NO     If yes, where, when, and reason for visit? Have you seen or consulted any other health care providers outside of the [address] [place] [address]  since your last visit? NO     If yes, where, when, and reason for visit? Ms. Gabriella Canales has a reminder for a \"due or due soon\" health maintenance. I have asked that she contact her primary care provider for follow-up on this health maintenance.

## 2017-09-11 NOTE — PROGRESS NOTES
HISTORY OF PRESENT ILLNESS  Teresa Gonzales is a 38 y.o. female. Patient presents for follow up of chronic, severe widespread pain due to fibromyalgia, chronic low back pain, and migraines. She reports that she continues to struggle with controlling her diabetes, and her insulin has recently been increased. She also notes bothersome numbness, tinlging and  strength weakness in both hands (L>R) for the past 3-4 weeks. She has also noted more swelling of late since her insulin was increased. We discussed that her symptoms are consistent with CTS. We discussed treatment options at length--see treatment plan below. Because she remains uninsured, she cannot afford any interventions, psychical therapy, or other medications. She is hopeful to be approved from the Care Card in the coming weeks. Medications continue to work modestly well for pain control overall, although she admits that Johnanna Bolognese is not as effective as it once was. Teresa Gonzales is tolerating medications well, with no untoward side effects noted. She is able to stay more active with less discomfort with these current doses. The patient reports an average of 40% relief with this regimen. Most recent UDS and  were consistent with prescribed medications. Pill counts are appropriate. She is informed of side effects, risks, and benefits of this regimen, and emphasizes that she derives a significant improvement in functionality and quality of life, and notes that non-opioid medications and therapies in the past have not offered significant benefit. She denies new or worsening insomnia or constipation issues. She denies any falls, injuries, or hospitalizations since the last visit. HPI--see above    ROS  Constitutional: Positive for malaise/fatigue. Gastrointestinal: Positive for constipation, heartburn and nausea. Musculoskeletal: Positive for back pain, joint pain (polyarticular), myalgias and neck pain.    Neurological: Positive for weakness (generalized). Psychiatric/Behavioral: The patient has insomnia. All other systems reviewed and are negative. Physical Exam  Constitutional: She is oriented to person, place, and time. She is in better spirits today  HENT:   Head: Normocephalic and atraumatic. Neck: Muscular tenderness: spasm. Decreased range of motion present. No Brudzinski's sign and no Kernig's sign noted. No thyromegaly present. Cardiovascular: Normal rate, regular rhythm and normal heart sounds. Pulmonary/Chest: Effort normal and breath sounds normal. No respiratory distress. She has no wheezes. She has no rales. Abdominal: Soft. She exhibits no distension. Mild tenderness at LLQ without guarding or rebound  Musculoskeletal: She exhibits tenderness. 16/18 points tender to palpation       Right shoulder: She exhibits decreased range of motion and tenderness. Left shoulder: She exhibits decreased range of motion and tenderness. Right hip: She exhibits decreased range of motion and tenderness. Left hip: She exhibits decreased range of motion and tenderness. Right knee: She exhibits decreased range of motion. Tenderness found. Left knee: She exhibits decreased range of motion. Tenderness found. Right ankle: She exhibits decreased range of motion. Tenderness. Left ankle: She exhibits decreased range of motion. Tenderness. Lumbar back: She exhibits decreased range of motion, tenderness, pain and spasm. Neurological: She is alert and oriented to person, place, and time. She has normal reflexes. No cranial nerve deficit or sensory deficit. She exhibits normal muscle tone. Gait abnormal. Coordination normal. Positive Phalen's sign bilaterally. Negative Tinel's sign. No thenar atrophy or  strength weakness noted. Skin: Skin is warm. No rash noted.  multiple pedunculated hyperpigmented tags around eyes and cheeks  Psychiatric: Her speech is normal and behavior is normal. Judgment and thought content normal. She exhibits a depressed mood. ASSESSMENT and PLAN    ICD-10-CM ICD-9-CM    1. Bilateral carpal tunnel syndrome G56.03 354.0    2. Left lower quadrant pain R10.32 789.04    3. Fibromyalgia M79.7 729.1    4. Encounter for long-term (current) use of medications Z79.899 V58.69    5. Polyarthralgia M25.50 719.49    6. Chronic migraine G43.709 346.70    7. Generalized OA M15.9 715.00    8. Neuropathic pain M79.2 729.2    9. Chronic insomnia F51.04 780.52       Plan:  Continue same medications as prescribed for chronic pain  Your symptoms are suspicious for bilateral carpal tunnel syndrome.  Consider trying bilateral wrist braces (can be purchased at [employer]/etc) as well as B vitamin complex (take two daily)  Follow up in 4 months or sooner if needed  Regular exercise and attention to emotional health and diet remain the most effective ways to treat chronic pain of all kinds  You may contact me with questions or concerns through [address]

## 2017-09-12 LAB
ALCOHOL UR POC: NORMAL
AMPHETAMINES UR POC: NORMAL
BARBITURATES UR POC: NORMAL
BENZODIAZEPINES UR POC: NORMAL
BUPRENORPHINE UR POC: NORMAL
CANNABINOIDS UR POC: NORMAL
CARISOPRODOL UR POC: NORMAL
COCAINE UR POC: NORMAL
FENTANYL UR POC: NORMAL
MDMA/ECSTASY UR POC: NORMAL
METHADONE UR POC: NORMAL
METHAMPHETAMINE UR POC: NORMAL
METHYLPHENIDATE UR POC: NORMAL
OPIATES UR POC: NORMAL
OXYCODONE UR POC: NORMAL
PHENCYCLIDINE UR POC: NORMAL
PROPOXYPHENE UR POC: NORMAL
TRAMADOL UR POC: NORMAL
TRICYCLICS UR POC: NORMAL

## 2018-01-03 ENCOUNTER — OFFICE VISIT (OUTPATIENT)
Dept: PAIN MANAGEMENT | Age: 43
End: 2018-01-03

## 2018-01-03 VITALS
HEIGHT: 69 IN | TEMPERATURE: 98 F | BODY MASS INDEX: 41.47 KG/M2 | SYSTOLIC BLOOD PRESSURE: 131 MMHG | WEIGHT: 280 LBS | DIASTOLIC BLOOD PRESSURE: 83 MMHG | HEART RATE: 89 BPM | RESPIRATION RATE: 16 BRPM

## 2018-01-03 DIAGNOSIS — Z79.899 ENCOUNTER FOR LONG-TERM (CURRENT) USE OF HIGH-RISK MEDICATION: Primary | ICD-10-CM

## 2018-01-03 DIAGNOSIS — M79.7 FIBROMYALGIA: ICD-10-CM

## 2018-01-03 DIAGNOSIS — M54.41 CHRONIC BILATERAL LOW BACK PAIN WITH RIGHT-SIDED SCIATICA: ICD-10-CM

## 2018-01-03 DIAGNOSIS — M15.9 GENERALIZED OA: ICD-10-CM

## 2018-01-03 DIAGNOSIS — G89.29 CHRONIC BILATERAL LOW BACK PAIN WITH RIGHT-SIDED SCIATICA: ICD-10-CM

## 2018-01-03 DIAGNOSIS — M25.50 POLYARTHRALGIA: ICD-10-CM

## 2018-01-03 RX ORDER — HYDROCODONE BITARTRATE AND ACETAMINOPHEN 10; 325 MG/1; MG/1
1 TABLET ORAL 2 TIMES DAILY
Qty: 60 TAB | Refills: 0 | Status: SHIPPED | OUTPATIENT
Start: 2018-01-03 | End: 2018-01-03 | Stop reason: SDUPTHER

## 2018-01-03 RX ORDER — HYDROCODONE BITARTRATE AND ACETAMINOPHEN 10; 325 MG/1; MG/1
1 TABLET ORAL 2 TIMES DAILY
Qty: 60 TAB | Refills: 0 | Status: SHIPPED | OUTPATIENT
Start: 2018-03-05 | End: 2018-03-28 | Stop reason: SDUPTHER

## 2018-01-03 RX ORDER — HYDROCODONE BITARTRATE AND ACETAMINOPHEN 10; 325 MG/1; MG/1
1 TABLET ORAL 2 TIMES DAILY
Qty: 60 TAB | Refills: 0 | Status: SHIPPED | OUTPATIENT
Start: 2018-02-04 | End: 2018-03-28 | Stop reason: SDUPTHER

## 2018-01-03 RX ORDER — GABAPENTIN 300 MG/1
300 CAPSULE ORAL EVERY 8 HOURS
Qty: 90 CAP | Refills: 1 | Status: SHIPPED | OUTPATIENT
Start: 2018-02-02 | End: 2018-03-28 | Stop reason: SDUPTHER

## 2018-01-03 RX ORDER — FLUOXETINE HYDROCHLORIDE 20 MG/1
CAPSULE ORAL DAILY
COMMUNITY
End: 2018-11-08

## 2018-01-03 RX ORDER — HYDROCODONE BITARTRATE AND ACETAMINOPHEN 10; 325 MG/1; MG/1
1 TABLET ORAL 2 TIMES DAILY
Qty: 60 TAB | Refills: 0 | Status: SHIPPED | OUTPATIENT
Start: 2018-01-05 | End: 2018-03-28 | Stop reason: SDUPTHER

## 2018-01-03 RX ORDER — GABAPENTIN 300 MG/1
CAPSULE ORAL
Qty: 53 CAP | Refills: 0 | Status: SHIPPED | OUTPATIENT
Start: 2018-01-03 | End: 2018-11-08 | Stop reason: SINTOL

## 2018-01-03 NOTE — PATIENT INSTRUCTIONS
1. Continue current plan with no evidence of addiction or diversion. Stable on current medication without adverse events. 2. Refill hydrocodone 10/325 mg up to 2 times daily as needed. 3. Add gabapentin 300 mg. Begin with 1 capsule each evening ×1 week then every 12 hours for the remaining of this prescription. Second prescription written for every 8 hours. 4. Continue Pamelor. Please discuss this medication and any further refills with your PCP and/or neurologist per  5. Discussed risks of addiction, dependency, and opioid induced hyperalgesia.    6. Return to clinic in 3 months

## 2018-01-03 NOTE — MR AVS SNAPSHOT
Visit Information Date & Time Provider Department Dept. Phone Encounter #  
 1/3/2018  2:20 PM [phone] Follow-up Instructions Return in about 3 months (around 4/3/2018). Upcoming Health Maintenance Date Due HEMOGLOBIN A1C Q6M 1973 FOOT EXAM Q1 6/13/1983 MICROALBUMIN Q1 6/13/1983 EYE EXAM RETINAL OR DILATED Q1 6/13/1983 Pneumococcal 19-64 Medium Risk (1 of 1 - PPSV23) 6/13/1992 DTaP/Tdap/Td series (1 - Tdap) 6/13/1994 LIPID PANEL Q1 3/27/2014 PAP AKA CERVICAL CYTOLOGY 4/3/2016 Influenza Age 5 to Adult 8/1/2017 Allergies as of 1/3/2018  Review Complete On: 1/3/2018 By: HERBIE Mckeon Severity Noted Reaction Type Reactions Aspirin Medium 10/05/2016   Systemic Other (comments) Chest pain Darvocet A500 [Propoxyphene N-acetaminophen] Medium 10/05/2016   Side Effect Hives Pcn [Penicillins] Medium 10/05/2016   Side Effect Hives Sulfur Medium 10/05/2016   Side Effect Hives Current Immunizations  Never Reviewed No immunizations on file. Not reviewed this visit You Were Diagnosed With   
  
 Codes Comments Encounter for long-term (current) use of high-risk medication    -  Primary ICD-10-CM: R64.804 ICD-9-CM: V58.69 Chronic bilateral low back pain with right-sided sciatica     ICD-10-CM: M54.41, G89.29 ICD-9-CM: 724.2, 724.3, 338.29 Polyarthralgia     ICD-10-CM: M25.50 ICD-9-CM: 719.49 Generalized OA     ICD-10-CM: M15.9 ICD-9-CM: 715.00 Fibromyalgia     ICD-10-CM: M79.7 ICD-9-CM: 729.1 Vitals BP Pulse Temp Resp Height(growth percentile) Weight(growth percentile) 131/83 (BP 1 Location: Left arm, BP Patient Position: Sitting) 89 98 °F (36.7 °C) (Oral) 16 5' 9\" (1.753 m) 280 lb (127 kg) BMI OB Status Smoking Status 41.35 kg/m2 Having regular periods Former Smoker Vitals History BMI and BSA Data Body Mass Index Body Surface Area  
 41.35 kg/m 2 2.49 m 2 Preferred Pharmacy Pharmacy Name Phone [address] [place] [address] Avenue [phone] Your Updated Medication List  
  
   
This list is accurate as of: 1/3/18  4:21 PM.  Always use your most recent med list.  
  
  
  
  
 BENADRYL 25 mg capsule Generic drug:  diphenhydrAMINE Take 25 mg by mouth every six (6) hours as needed. BENTYL 20 mg tablet Generic drug:  dicyclomine Take 20 mg by mouth every six (6) hours. * gabapentin 300 mg capsule Commonly known as:  NEURONTIN Take 1 capsule once in the evening for one week, then 2 times a day thereafter. #53 * gabapentin 300 mg capsule Commonly known as:  NEURONTIN Take 1 Cap by mouth every eight (8) hours for 30 days. Start taking on:  2/2/2018  
  
 glimepiride 4 mg tablet Commonly known as:  AMARYL Take  by mouth every morning. HumaLOG Mix 75-25 100 unit/mL (75-25) injection Generic drug:  insulin lispro protamine/insulin lispro  
by SubCUTAneous route. hydroCHLOROthiazide 25 mg tablet Commonly known as:  HYDRODIURIL Take 25 mg by mouth daily. * HYDROcodone-acetaminophen  mg tablet Commonly known as:  [place] Take 1 Tab by mouth two (2) times a day. Max Daily Amount: 2 Tabs. For severe pain flares * HYDROcodone-acetaminophen  mg tablet Commonly known as:  [place] Take 1 Tab by mouth two (2) times a day. Max Daily Amount: 2 Tabs. For severe pain flares * HYDROcodone-acetaminophen  mg tablet Commonly known as:  [place] Take 1 Tab by mouth two (2) times a day. Max Daily Amount: 2 Tabs. For severe pain flares  
  
 ibuprofen 800 mg tablet Commonly known as:  MOTRIN Take  by mouth. nortriptyline 50 mg capsule Commonly known as:  PAMELOR Take 2 Caps by mouth nightly. Start with 1 cap qhs for migraines.  increase to 2 qhs after 7 days if needed  
  
 promethazine 25 mg tablet Commonly known as:  PHENERGAN Take 25 mg by mouth every six (6) hours as needed for Nausea. PROzac 20 mg capsule Generic drug:  FLUoxetine Take  by mouth daily. topiramate 100 mg tablet Commonly known as:  TOPAMAX Take  by mouth two (2) times a day. TYLENOL EXTRA STRENGTH 500 mg tablet Generic drug:  acetaminophen Take  by mouth every six (6) hours as needed for Pain. venlafaxine-SR 37.5 mg capsule Commonly known as:  EFFEXOR-XR  
1 po qpm x 7 days, then 2 po qpm  
  
 * Notice: This list has 5 medication(s) that are the same as other medications prescribed for you. Read the directions carefully, and ask your doctor or other care provider to review them with you. Prescriptions Printed Refills HYDROcodone-acetaminophen (NORCO)  mg tablet 0 Sig: Take 1 Tab by mouth two (2) times a day. Max Daily Amount: 2 Tabs. For severe pain flares Class: Print Route: Oral  
 HYDROcodone-acetaminophen (NORCO)  mg tablet 0 Sig: Take 1 Tab by mouth two (2) times a day. Max Daily Amount: 2 Tabs. For severe pain flares Class: Print Route: Oral  
 HYDROcodone-acetaminophen (NORCO)  mg tablet 0 Sig: Take 1 Tab by mouth two (2) times a day. Max Daily Amount: 2 Tabs. For severe pain flares Class: Print Route: Oral  
  
Prescriptions Sent to Pharmacy Refills  
 gabapentin (NEURONTIN) 300 mg capsule 0 Sig: Take 1 capsule once in the evening for one week, then 2 times a day thereafter. #53 Class: Normal  
 Pharmacy: [facility] [address] COLLINS [address] Ph #: [phone]  
 gabapentin (NEURONTIN) 300 mg capsule 1 Starting on: 2/2/2018 Sig: Take 1 Cap by mouth every eight (8) hours for 30 days. Class: Normal  
 Pharmacy: [facility] [address] MICA COLLINS [address] Ph #: [phone]  Route: Oral  
  
 We Performed the Following AMB POC DRUG SCREEN () [ [facility]] DRUG SCREEN [[id number] Custom] Follow-up Instructions Return in about 3 months (around 4/3/2018). Patient Instructions 1. Continue current plan with no evidence of addiction or diversion. Stable on current medication without adverse events. 2. Refill hydrocodone 10/325 mg up to 2 times daily as needed. 3. Add gabapentin 300 mg. Begin with 1 capsule each evening ×1 week then every 12 hours for the remaining of this prescription. Second prescription written for every 8 hours. 4. Continue Pamelor. Please discuss this medication and any further refills with your PCP and/or neurologist per 5. Discussed risks of addiction, dependency, and opioid induced hyperalgesia. 6. Return to clinic in 3 months Introducing [facility]! Dear Sherron Miller: Thank you for requesting a [employer] account. Our records indicate that you already have an active [employer] account. You can access your account anytime at [email or web address] Did you know that you can access your hospital and ER discharge instructions at any time in [employer]? You can also review all of your test results from your hospital stay or ER visit. Additional Information If you have questions, please visit the Frequently Asked Questions section of the [employer] website at [email or web address] Remember, [employer] is NOT to be used for urgent needs. For medical emergencies, dial 911. Now available from your iPhone and Android! Please provide this summary of care documentation to your next provider. Your primary care clinician is listed as Mathew Herrera. If you have any questions after today's visit, please call [phone].

## 2018-01-03 NOTE — PROGRESS NOTES
Nursing Notes    Patient presents to the office today in follow-up. Patient rates her pain at 6/10 on the numerical pain scale. Reviewed medications with counts as follows:    Rx Date filled Qty Dispensed Pill Count Last Dose Short   HYDROCODONE  W/ APAP  MG TAB 12/6/17 60 41.5 TODAY NO                                        Comments:     POC UDS was performed in office today per verbal order and correct read back from provider. Any new labs or imaging since last appointment? NO     Have you been to an emergency room (ER) or urgent care clinic since your last visit? NO            Have you been hospitalized since your last visit? NO     If yes, where, when, and reason for visit? Have you seen or consulted any other health care providers outside of the [address] [place] [address]  since your last visit? YES, PCP. If yes, where, when, and reason for visit? HM deferred to pcp.

## 2018-01-03 NOTE — PROGRESS NOTES
HISTORY OF PRESENT ILLNESS  Oz Montoya is a 38 y.o. female    HPI: Ms. Dayna Howard  returns today for f/u of chronic, severe widespread pain due to fibromyalgia and chronic low back pain. Today is my first visit with Ms. Dayna Howard. She continues with pain unchanged since last visit. She has been doing well with her current treatment plan which has been offering significant pain control. She does report some uncontrolled pain associated with her fibromyalgia. We discussed several different options in the office today. She remembers trying gabapentin and Lyrica in the past.  She says that she cannot afford Lyrica but does not remember if gabapentin was effective or not. She has agreed to trial gabapentin once again. We will start with a tapering dose tapering as tolerated. I will have her follow-up in 3 months or sooner if needed     Medications are helping with pain control and quality of life. Her pain is 4/10 with medication and 6/10 without. Pt describes pain as crushing. Aggravating factors include walking and bending. Relieved with rest, medication, and avoiding painful activities. Current treatment is helping to improve general activity, mood, walking, sleep, enjoyment of life    Ms. Dayna Howard is tolerating medications well, with no side effects noted. She is able to stay more active with less discomfort with these current doses. The patient reports an average of 10-20% pain relief with current treatment/medications. Pill counts are appropriate. She is informed of side effects, risks, and benefits of this regimen, and emphasizes that she derives a significant improvement in functionality and quality of life, and notes that non-opioid medications and therapies in the past have not offered significant benefit. She  is otherwise doing well with no other complaints today. She denies any adverse events including nausea, vomiting, dizziness, increased constipation, hallucinations, or seizures.      POC UDS today. Confirmation pending. Allergies   Allergen Reactions    Aspirin Other (comments)     Chest pain    Darvocet A500 [Propoxyphene N-Acetaminophen] Hives    Pcn [Penicillins] Hives    Sulfur Hives       Past Surgical History:   Procedure Laterality Date    HX CHOLECYSTECTOMY      HX GYN  1990    c section    HX ORTHOPAEDIC  1996    pelvic repair         Review of Systems   Constitutional: Negative for chills, fever and weight loss. HENT: Negative for congestion and sore throat. Eyes: Negative for blurred vision and double vision. Respiratory: Negative for cough, shortness of breath and wheezing. Cardiovascular: Negative for chest pain and palpitations. Gastrointestinal: Negative for abdominal pain, constipation, diarrhea, heartburn, nausea and vomiting. Genitourinary: Negative. Musculoskeletal: Positive for back pain and joint pain. Negative for neck pain. Neurological: Positive for headaches. Negative for dizziness, seizures and loss of consciousness. Endo/Heme/Allergies: Does not bruise/bleed easily. Psychiatric/Behavioral: Negative for depression. The patient is not nervous/anxious and does not have insomnia. Physical Exam   Constitutional: She is oriented to person, place, and time and well-developed, well-nourished, and in no distress. No distress. HENT:   Head: Normocephalic and atraumatic. Eyes: EOM are normal.   Pulmonary/Chest: Effort normal.   Neurological: She is alert and oriented to person, place, and time. Skin: Skin is warm and dry. No rash noted. She is not diaphoretic. No erythema. Psychiatric: Mood, memory, affect and judgment normal.   Nursing note and vitals reviewed. ASSESSMENT:    1. Encounter for long-term (current) use of high-risk medication    2. Chronic bilateral low back pain with right-sided sciatica    3. Polyarthralgia    4. Generalized OA    5.  Fibromyalgia           [place] Prescription Monitoring Program was reviewed which does not demonstrate aberrancies and/or inconsistencies with regard to the historical prescribing of controlled medications to this patient by other providers. PLAN / Pt Instructions:  1. Continue current plan with no evidence of addiction or diversion. Stable on current medication without adverse events. 2. Refill hydrocodone 10/325 mg up to 2 times daily as needed. 3. Add gabapentin 300 mg. Begin with 1 capsule each evening ×1 week then every 12 hours for the remaining of this prescription. Second prescription written for every 8 hours. 4. Continue Pamelor. Please discuss this medication and any further refills with your PCP and/or neurologist per  5. Discussed risks of addiction, dependency, and opioid induced hyperalgesia. 6. Return to clinic in 3 months    Medications Ordered Today   Medications    DISCONTD: HYDROcodone-acetaminophen (NORCO)  mg tablet     Sig: Take 1 Tab by mouth two (2) times a day. Max Daily Amount: 2 Tabs. For severe pain flares     Dispense:  60 Tab     Refill:  0    DISCONTD: HYDROcodone-acetaminophen (NORCO)  mg tablet     Sig: Take 1 Tab by mouth two (2) times a day. Max Daily Amount: 2 Tabs. For severe pain flares     Dispense:  60 Tab     Refill:  0    DISCONTD: HYDROcodone-acetaminophen (NORCO)  mg tablet     Sig: Take 1 Tab by mouth two (2) times a day. Max Daily Amount: 2 Tabs. For severe pain flares     Dispense:  60 Tab     Refill:  0    gabapentin (NEURONTIN) 300 mg capsule     Sig: Take 1 capsule once in the evening for one week, then 2 times a day thereafter. #53     Dispense:  53 Cap     Refill:  0    gabapentin (NEURONTIN) 300 mg capsule     Sig: Take 1 Cap by mouth every eight (8) hours for 30 days. Dispense:  90 Cap     Refill:  1    HYDROcodone-acetaminophen (NORCO)  mg tablet     Sig: Take 1 Tab by mouth two (2) times a day. Max Daily Amount: 2 Tabs.  For severe pain flares     Dispense:  60 Tab     Refill:  0    HYDROcodone-acetaminophen (NORCO)  mg tablet     Sig: Take 1 Tab by mouth two (2) times a day. Max Daily Amount: 2 Tabs. For severe pain flares     Dispense:  60 Tab     Refill:  0    HYDROcodone-acetaminophen (NORCO)  mg tablet     Sig: Take 1 Tab by mouth two (2) times a day. Max Daily Amount: 2 Tabs. For severe pain flares     Dispense:  60 Tab     Refill:  0           DISPOSITION     Pain medications are prescribed with the objective of pain relief and improved physical and psychosocial function in this patient.  Pain Meds and Quality Of Life have been reviewed. Nonpharmacologic therapy and non-opioid pharmacologic therapy have been considered. Opioid therapy is only prescribed if the expected benefits are anticipated to outweigh risks.  Counseled patient on proper use of prescribed medications.  Reviewed with patient self-help tools, home exercise, and lifestyle changes to assist the patient in self-management of symptoms.  Reviewed with patient the treatment plan, goals of treatment plan, and limitations of treatment plan, to include the potential for side effects from medications and procedures. If side effects occur, it is the responsibility of the patient to inform the clinic so that a change in the treatment plan can be made in a safe manner. The patient is advised that stopping prescribed medication may cause an increase in symptoms and possible medication withdrawal symptoms. The patient is informed an emergency room evaluation may be necessary if this occurs. Spent 25 minutes with patient today which more than 50% of that time was spent on counseling and coordination of care. Johnny Wise 1/3/2018        Note: Please excuse any typographical errors. Voice recognition software was used for this note and may cause mistakes.

## 2018-03-28 ENCOUNTER — OFFICE VISIT (OUTPATIENT)
Dept: PAIN MANAGEMENT | Age: 43
End: 2018-03-28

## 2018-03-28 VITALS
DIASTOLIC BLOOD PRESSURE: 82 MMHG | SYSTOLIC BLOOD PRESSURE: 124 MMHG | HEART RATE: 78 BPM | BODY MASS INDEX: 41.47 KG/M2 | HEIGHT: 69 IN | WEIGHT: 280 LBS | TEMPERATURE: 97.7 F | RESPIRATION RATE: 16 BRPM

## 2018-03-28 DIAGNOSIS — M54.41 CHRONIC BILATERAL LOW BACK PAIN WITH RIGHT-SIDED SCIATICA: ICD-10-CM

## 2018-03-28 DIAGNOSIS — M79.2 NEUROPATHIC PAIN: ICD-10-CM

## 2018-03-28 DIAGNOSIS — M79.7 FIBROMYALGIA: ICD-10-CM

## 2018-03-28 DIAGNOSIS — M15.9 GENERALIZED OA: ICD-10-CM

## 2018-03-28 DIAGNOSIS — G89.29 CHRONIC BILATERAL LOW BACK PAIN WITH RIGHT-SIDED SCIATICA: ICD-10-CM

## 2018-03-28 DIAGNOSIS — M25.50 POLYARTHRALGIA: ICD-10-CM

## 2018-03-28 RX ORDER — HYDROCODONE BITARTRATE AND ACETAMINOPHEN 10; 325 MG/1; MG/1
1 TABLET ORAL 2 TIMES DAILY
Qty: 60 TAB | Refills: 0 | Status: SHIPPED | OUTPATIENT
Start: 2018-03-28 | End: 2018-06-28 | Stop reason: SDUPTHER

## 2018-03-28 RX ORDER — HYDROCODONE BITARTRATE AND ACETAMINOPHEN 10; 325 MG/1; MG/1
1 TABLET ORAL 2 TIMES DAILY
Qty: 60 TAB | Refills: 0 | Status: SHIPPED | OUTPATIENT
Start: 2018-05-26 | End: 2018-06-28 | Stop reason: SDUPTHER

## 2018-03-28 RX ORDER — GABAPENTIN 300 MG/1
300 CAPSULE ORAL EVERY 12 HOURS
Qty: 60 CAP | Refills: 2 | Status: SHIPPED | OUTPATIENT
Start: 2018-03-28 | End: 2018-04-27

## 2018-03-28 RX ORDER — HYDROCODONE BITARTRATE AND ACETAMINOPHEN 10; 325 MG/1; MG/1
1 TABLET ORAL 2 TIMES DAILY
Qty: 60 TAB | Refills: 0 | Status: SHIPPED | OUTPATIENT
Start: 2018-04-27 | End: 2018-06-28 | Stop reason: SDUPTHER

## 2018-03-28 NOTE — PROGRESS NOTES
HISTORY OF PRESENT ILLNESS  Jaden Burr is a 38 y.o. female    HPI: Ms. Sherren Cruise  returns today for f/u of chronic, severe widespread pain due to fibromyalgia and chronic low back pain. Ms. Sherren Cruise continues unchanged since last visit. She does report some mild improvement so far with gabapentin. We started gabapentin last visit tapering up to every 8 hours however she is only been able to tolerate every 12 hours. We will continue with this dosage for now. She has not followed up with her PCP regarding her Pamelor at this time. She is currently scheduled for appointment soon and will discuss this medication at that time. She is otherwise doing well with no other complaints. I will have her follow-up in 3 months for further evaluation and recommendation. Medications are helping with pain control and quality of life. Her pain is 4/10 with medication and 6/10 without. Pt describes pain as crushing. Aggravating factors include walking and bending. Relieved with rest, medication, and avoiding painful activities. Current treatment is helping to improve general activity, mood, walking, sleep, enjoyment of life    Ms. Sherren Cruise is tolerating medications well, with no side effects noted. She is able to stay more active with less discomfort with these current doses. The patient reports an average of 10-20% pain relief with current treatment/medications. Pill counts are appropriate. She is informed of side effects, risks, and benefits of this regimen, and emphasizes that she derives a significant improvement in functionality and quality of life, and notes that non-opioid medications and therapies in the past have not offered significant benefit. She  is otherwise doing well with no other complaints today. She denies any adverse events including nausea, vomiting, dizziness, increased constipation, hallucinations, or seizures.         Allergies   Allergen Reactions    Aspirin Other (comments)     Chest pain    Darvocet A500 [Propoxyphene N-Acetaminophen] Hives    Pcn [Penicillins] Hives    Sulfur Hives       Past Surgical History:   Procedure Laterality Date    HX CHOLECYSTECTOMY      HX GYN  1990    c section    HX ORTHOPAEDIC  1996    pelvic repair         Review of Systems   Constitutional: Negative for chills, fever and weight loss. HENT: Negative for congestion and sore throat. Eyes: Negative for blurred vision and double vision. Respiratory: Negative for cough, shortness of breath and wheezing. Cardiovascular: Negative for chest pain and palpitations. Gastrointestinal: Negative for abdominal pain, constipation, diarrhea, heartburn, nausea and vomiting. Genitourinary: Negative. Musculoskeletal: Positive for back pain and joint pain. Negative for neck pain. Neurological: Positive for headaches. Negative for dizziness, seizures and loss of consciousness. Endo/Heme/Allergies: Does not bruise/bleed easily. Psychiatric/Behavioral: Negative for depression. The patient is not nervous/anxious and does not have insomnia. Physical Exam   Constitutional: She is oriented to person, place, and time and well-developed, well-nourished, and in no distress. No distress. HENT:   Head: Normocephalic and atraumatic. Eyes: EOM are normal.   Pulmonary/Chest: Effort normal.   Neurological: She is alert and oriented to person, place, and time. Skin: Skin is warm and dry. No rash noted. She is not diaphoretic. No erythema. Psychiatric: Mood, memory, affect and judgment normal.   Nursing note and vitals reviewed. ASSESSMENT:    1. Chronic bilateral low back pain with right-sided sciatica    2. Polyarthralgia    3. Generalized OA    4. Neuropathic pain    5.  Fibromyalgia           [place] Prescription Monitoring Program was reviewed which does not demonstrate aberrancies and/or inconsistencies with regard to the historical prescribing of controlled medications to this patient by other providers. PLAN / Pt Instructions:  1. Continue current plan with no evidence of addiction or diversion. Stable on current medication without adverse events. 2. Refill hydrocodone 10/325 mg up to 2 times daily as needed. 3. Refill gabapentin 300 mg every 12 hours. 2 refills  4. Continue Pamelor. Please discuss this medication and any further refills with your PCP and/or neurologist   5. Discussed risks of addiction, dependency, and opioid induced hyperalgesia. 6. Return to clinic in 3 months    Medications Ordered Today   Medications    HYDROcodone-acetaminophen (NORCO)  mg tablet     Sig: Take 1 Tab by mouth two (2) times a day. Max Daily Amount: 2 Tabs. For severe pain flares     Dispense:  60 Tab     Refill:  0    HYDROcodone-acetaminophen (NORCO)  mg tablet     Sig: Take 1 Tab by mouth two (2) times a day. Max Daily Amount: 2 Tabs. For severe pain flares     Dispense:  60 Tab     Refill:  0    HYDROcodone-acetaminophen (NORCO)  mg tablet     Sig: Take 1 Tab by mouth two (2) times a day. Max Daily Amount: 2 Tabs. For severe pain flares     Dispense:  60 Tab     Refill:  0    gabapentin (NEURONTIN) 300 mg capsule     Sig: Take 1 Cap by mouth every twelve (12) hours for 30 days. Dispense:  60 Cap     Refill:  2           DISPOSITION     Pain medications are prescribed with the objective of pain relief and improved physical and psychosocial function in this patient.  Pain Meds and Quality Of Life have been reviewed. Nonpharmacologic therapy and non-opioid pharmacologic therapy have been considered. Opioid therapy is only prescribed if the expected benefits are anticipated to outweigh risks.  Counseled patient on proper use of prescribed medications.  Reviewed with patient self-help tools, home exercise, and lifestyle changes to assist the patient in self-management of symptoms.    Reviewed with patient the treatment plan, goals of treatment plan, and limitations of treatment plan, to include the potential for side effects from medications and procedures. If side effects occur, it is the responsibility of the patient to inform the clinic so that a change in the treatment plan can be made in a safe manner. The patient is advised that stopping prescribed medication may cause an increase in symptoms and possible medication withdrawal symptoms. The patient is informed an emergency room evaluation may be necessary if this occurs. Spent 25 minutes with patient today which more than 50% of that time was spent on counseling and coordination of care. Johnny Loredo 3/28/2018        Note: Please excuse any typographical errors. Voice recognition software was used for this note and may cause mistakes.

## 2018-03-28 NOTE — PROGRESS NOTES
Nursing Notes    Patient presents to the office today in follow-up. Patient rates her pain at 3/10 on the numerical pain scale. Reviewed medications with counts as follows:    Rx Date filled Qty Dispensed Pill Count Last Dose Short   Norco 10 mg 02/27/18 60 25.5 today no   . Comments: Patient is here today for a follow up appt today she states per pain level today is a d3  She states she goes to [facility] for her diabetes management     POC UDS was not performed in office today    Any new labs or imaging since last appointment? NO    Have you been to an emergency room (ER) or urgent care clinic since your last visit? NO            Have you been hospitalized since your last visit? NO     If yes, where, when, and reason for visit? Have you seen or consulted any other health care providers outside of the [address] [place] [address]  since your last visit? [facility]      If yes, where, when, and reason for visit? HM deferred to pcp.

## 2018-03-28 NOTE — MR AVS SNAPSHOT
[address] [facility] [address] 
[phone] Patient: Steve Horn MRN: [id number] Visit Information Date & Time Provider Department Dept. Phone Encounter #  
 3/28/2018 12:40 PM Bia Duncan [address] [facility] [phone] Follow-up Instructions Return in about 3 months (around 6/28/2018). Your Appointments 6/28/2018 12:20 PM  
Follow Up with HERBIE Duncan [address] [facility] (LUIS ENRIQUE SCHEDULING) Appt Note: Return in about 3 months (around 6/28/2018). [address] [facility] [address]  
[phone] [facility] [id number] Upcoming Health Maintenance Date Due HEMOGLOBIN A1C Q6M 1973 FOOT EXAM Q1 6/13/1983 MICROALBUMIN Q1 6/13/1983 EYE EXAM RETINAL OR DILATED Q1 6/13/1983 Pneumococcal 19-64 Medium Risk (1 of 1 - PPSV23) 6/13/1992 DTaP/Tdap/Td series (1 - Tdap) 6/13/1994 LIPID PANEL Q1 3/27/2014 PAP AKA CERVICAL CYTOLOGY 4/3/2016 Influenza Age 5 to Adult 8/1/2017 Allergies as of 3/28/2018  Review Complete On: 3/28/2018 By: HERBIE Duncan Severity Noted Reaction Type Reactions Aspirin Medium 10/05/2016   Systemic Other (comments) Chest pain Darvocet A500 [Propoxyphene N-acetaminophen] Medium 10/05/2016   Side Effect Hives Pcn [Penicillins] Medium 10/05/2016   Side Effect Hives Sulfur Medium 10/05/2016   Side Effect Hives Current Immunizations  Never Reviewed No immunizations on file. Not reviewed this visit You Were Diagnosed With   
  
 Codes Comments Chronic bilateral low back pain with right-sided sciatica     ICD-10-CM: M54.41, G89.29 ICD-9-CM: 724.2, 724.3, 338.29 Polyarthralgia     ICD-10-CM: M25.50 ICD-9-CM: 719.49 Generalized OA     ICD-10-CM: M15.9 ICD-9-CM: 715.00   
 Neuropathic pain     ICD-10-CM: M79.2 ICD-9-CM: 729.2 Fibromyalgia     ICD-10-CM: M79.7 ICD-9-CM: 729.1 Vitals BP Pulse Temp Resp Height(growth percentile) Weight(growth percentile) 124/82 (BP 1 Location: Right arm, BP Patient Position: Sitting) 78 97.7 °F (36.5 °C) 16 5' 9\" (1.753 m) 280 lb (127 kg) BMI OB Status Smoking Status 41.35 kg/m2 Having regular periods Former Smoker BMI and BSA Data Body Mass Index Body Surface Area  
 41.35 kg/m 2 2.49 m 2 Preferred Pharmacy Pharmacy Name Phone [address] [phone] Your Updated Medication List  
  
   
This list is accurate as of 3/28/18  1:02 PM.  Always use your most recent med list.  
  
  
  
  
 BENADRYL 25 mg capsule Generic drug:  diphenhydrAMINE Take 25 mg by mouth every six (6) hours as needed. BENTYL 20 mg tablet Generic drug:  dicyclomine Take 20 mg by mouth every six (6) hours. * gabapentin 300 mg capsule Commonly known as:  NEURONTIN Take 1 capsule once in the evening for one week, then 2 times a day thereafter. #53 * gabapentin 300 mg capsule Commonly known as:  NEURONTIN Take 1 Cap by mouth every twelve (12) hours for 30 days. glimepiride 4 mg tablet Commonly known as:  AMARYL Take  by mouth every morning. HumaLOG Mix 75-25(U-100)Insuln 100 unit/mL (75-25) injection Generic drug:  insulin lispro protamine/insulin lispro  
by SubCUTAneous route. hydroCHLOROthiazide 25 mg tablet Commonly known as:  HYDRODIURIL Take 25 mg by mouth daily. * HYDROcodone-acetaminophen  mg tablet Commonly known as:  Mertie Burson Take 1 Tab by mouth two (2) times a day. Max Daily Amount: 2 Tabs. For severe pain flares * HYDROcodone-acetaminophen  mg tablet Commonly known as:  Mertie Burson Take 1 Tab by mouth two (2) times a day. Max Daily Amount: 2 Tabs. For severe pain flares Start taking on:  4/27/2018  
  
 * HYDROcodone-acetaminophen  mg tablet Commonly known as:  Jt Theodoravius Take 1 Tab by mouth two (2) times a day. Max Daily Amount: 2 Tabs. For severe pain flares Start taking on:  5/26/2018  
  
 ibuprofen 800 mg tablet Commonly known as:  MOTRIN Take  by mouth. nortriptyline 50 mg capsule Commonly known as:  PAMELOR Take 2 Caps by mouth nightly. Start with 1 cap qhs for migraines. increase to 2 qhs after 7 days if needed  
  
 promethazine 25 mg tablet Commonly known as:  PHENERGAN Take 25 mg by mouth every six (6) hours as needed for Nausea. PROzac 20 mg capsule Generic drug:  FLUoxetine Take  by mouth daily. topiramate 100 mg tablet Commonly known as:  TOPAMAX Take  by mouth two (2) times a day. TYLENOL EXTRA STRENGTH 500 mg tablet Generic drug:  acetaminophen Take  by mouth every six (6) hours as needed for Pain. venlafaxine-SR 37.5 mg capsule Commonly known as:  EFFEXOR-XR  
1 po qpm x 7 days, then 2 po qpm  
  
 * Notice: This list has 5 medication(s) that are the same as other medications prescribed for you. Read the directions carefully, and ask your doctor or other care provider to review them with you. Prescriptions Printed Refills HYDROcodone-acetaminophen (NORCO)  mg tablet 0 Sig: Take 1 Tab by mouth two (2) times a day. Max Daily Amount: 2 Tabs. For severe pain flares Class: Print Route: Oral  
 HYDROcodone-acetaminophen (NORCO)  mg tablet 0 Starting on: 4/27/2018 Sig: Take 1 Tab by mouth two (2) times a day. Max Daily Amount: 2 Tabs. For severe pain flares Class: Print Route: Oral  
 HYDROcodone-acetaminophen (NORCO)  mg tablet 0 Starting on: 5/26/2018 Sig: Take 1 Tab by mouth two (2) times a day. Max Daily Amount: 2 Tabs. For severe pain flares Class: Print Route: Oral  
  
Prescriptions Sent to Pharmacy Refills gabapentin (NEURONTIN) 300 mg capsule 2 Sig: Take 1 Cap by mouth every twelve (12) hours for 30 days. Class: Normal  
 Pharmacy: [address] [place], [address] [place] [address] #: [phone] Route: Oral  
  
Follow-up Instructions Return in about 3 months (around 6/28/2018). Patient Instructions 1. Continue current plan with no evidence of addiction or diversion. Stable on current medication without adverse events. 2. Refill hydrocodone 10/325 mg up to 2 times daily as needed. 3. Refill gabapentin 300 mg every 12 hours. 2 refills 4. Continue Pamelor. Please discuss this medication and any further refills with your PCP and/or neurologist  
5. Discussed risks of addiction, dependency, and opioid induced hyperalgesia. 6. Return to clinic in 3 months Introducing [facility]! Dear Mignon Batista: Thank you for requesting a [email or web address] account. Our records indicate that you already have an active [email or web address] account. You can access your account anytime at [email or web address] Did you know that you can access your hospital and ER discharge instructions at any time in [employer]? You can also review all of your test results from your hospital stay or ER visit. Additional Information If you have questions, please visit the Frequently Asked Questions section of the [email or web address] website at [email or web address] Remember, [email or web address] is NOT to be used for urgent needs. For medical emergencies, dial 911. Now available from your iPhone and Android! Please provide this summary of care documentation to your next provider. Your primary care clinician is listed as Fernandez Cole. If you have any questions after today's visit, please call [phone].

## 2018-03-28 NOTE — PATIENT INSTRUCTIONS
1. Continue current plan with no evidence of addiction or diversion. Stable on current medication without adverse events. 2. Refill hydrocodone 10/325 mg up to 2 times daily as needed. 3. Refill gabapentin 300 mg every 12 hours. 2 refills  4. Continue Pamelor. Please discuss this medication and any further refills with your PCP and/or neurologist   5. Discussed risks of addiction, dependency, and opioid induced hyperalgesia.    6. Return to clinic in 3 months

## 2018-06-28 ENCOUNTER — OFFICE VISIT (OUTPATIENT)
Dept: PAIN MANAGEMENT | Age: 43
End: 2018-06-28

## 2018-06-28 VITALS
DIASTOLIC BLOOD PRESSURE: 96 MMHG | RESPIRATION RATE: 16 BRPM | HEIGHT: 69 IN | TEMPERATURE: 97.1 F | WEIGHT: 280 LBS | SYSTOLIC BLOOD PRESSURE: 131 MMHG | BODY MASS INDEX: 41.47 KG/M2 | HEART RATE: 88 BPM

## 2018-06-28 DIAGNOSIS — M79.7 FIBROMYALGIA: ICD-10-CM

## 2018-06-28 DIAGNOSIS — Z79.899 ENCOUNTER FOR LONG-TERM (CURRENT) USE OF HIGH-RISK MEDICATION: Primary | ICD-10-CM

## 2018-06-28 DIAGNOSIS — G89.29 CHRONIC BILATERAL LOW BACK PAIN WITH RIGHT-SIDED SCIATICA: ICD-10-CM

## 2018-06-28 DIAGNOSIS — M79.2 NEUROPATHIC PAIN: ICD-10-CM

## 2018-06-28 DIAGNOSIS — M54.41 CHRONIC BILATERAL LOW BACK PAIN WITH RIGHT-SIDED SCIATICA: ICD-10-CM

## 2018-06-28 DIAGNOSIS — M25.50 POLYARTHRALGIA: ICD-10-CM

## 2018-06-28 LAB
ALCOHOL UR POC: NORMAL
AMPHETAMINES UR POC: NEGATIVE
BARBITURATES UR POC: NORMAL
BENZODIAZEPINES UR POC: NEGATIVE
BUPRENORPHINE UR POC: NEGATIVE
CANNABINOIDS UR POC: NEGATIVE
CARISOPRODOL UR POC: NORMAL
COCAINE UR POC: NEGATIVE
FENTANYL UR POC: NORMAL
MDMA/ECSTASY UR POC: NORMAL
METHADONE UR POC: NEGATIVE
METHAMPHETAMINE UR POC: NORMAL
METHYLPHENIDATE UR POC: NORMAL
OPIATES UR POC: NORMAL
OXYCODONE UR POC: NORMAL
PHENCYCLIDINE UR POC: NORMAL
PROPOXYPHENE UR POC: NORMAL
TRAMADOL UR POC: NORMAL
TRICYCLICS UR POC: NORMAL

## 2018-06-28 RX ORDER — HYDROCODONE BITARTRATE AND ACETAMINOPHEN 10; 325 MG/1; MG/1
1 TABLET ORAL 2 TIMES DAILY
Qty: 60 TAB | Refills: 0 | Status: SHIPPED | OUTPATIENT
Start: 2018-08-05 | End: 2018-09-04

## 2018-06-28 RX ORDER — HYDROCODONE BITARTRATE AND ACETAMINOPHEN 10; 325 MG/1; MG/1
1 TABLET ORAL 2 TIMES DAILY
Qty: 60 TAB | Refills: 0 | Status: SHIPPED | OUTPATIENT
Start: 2018-09-04 | End: 2018-09-12 | Stop reason: SDUPTHER

## 2018-06-28 RX ORDER — HYDROCODONE BITARTRATE AND ACETAMINOPHEN 10; 325 MG/1; MG/1
1 TABLET ORAL 2 TIMES DAILY
Qty: 60 TAB | Refills: 0 | Status: SHIPPED | OUTPATIENT
Start: 2018-07-06 | End: 2018-08-05

## 2018-06-28 NOTE — PATIENT INSTRUCTIONS
1. Continue current plan with no evidence of addiction or diversion. Stable on current medication without adverse events. 2. Refill hydrocodone 10/325 mg up to 2 times daily as needed. 3. Discussed risks of addiction, dependency, and opioid induced hyperalgesia. Please remember to call at least 4-5 business days prior to your medication refill. Return to clinic in 3 months. Please call and cancel your appointment and pain management agreement if you do decide to transfer your care.

## 2018-06-28 NOTE — MR AVS SNAPSHOT
[address] [address] 
[phone] Patient: Cailin Hirsch MRN: [id number] Visit Information Date & Time Provider Department Dept. Phone Encounter #  
 6/28/2018 12:20 PM [facility] [id number] Follow-up Instructions Return in about 3 months (around 9/28/2018). Upcoming Health Maintenance Date Due HEMOGLOBIN A1C Q6M 1973 FOOT EXAM Q1 6/13/1983 MICROALBUMIN Q1 6/13/1983 EYE EXAM RETINAL OR DILATED Q1 6/13/1983 Pneumococcal 19-64 Medium Risk (1 of 1 - PPSV23) 6/13/1992 DTaP/Tdap/Td series (1 - Tdap) 6/13/1994 LIPID PANEL Q1 3/27/2014 PAP AKA CERVICAL CYTOLOGY 4/3/2016 Influenza Age 5 to Adult 8/1/2018 Allergies as of 6/28/2018  Review Complete On: 6/28/2018 By: HERBIE Jade Severity Noted Reaction Type Reactions Aspirin Medium 10/05/2016   Systemic Other (comments) Chest pain Darvocet A500 [Propoxyphene N-acetaminophen] Medium 10/05/2016   Side Effect Hives Pcn [Penicillins] Medium 10/05/2016   Side Effect Hives Sulfur Medium 10/05/2016   Side Effect Hives Current Immunizations  Never Reviewed No immunizations on file. Not reviewed this visit You Were Diagnosed With   
  
 Codes Comments Encounter for long-term (current) use of high-risk medication    -  Primary ICD-10-CM: X91.348 ICD-9-CM: V58.69 Chronic bilateral low back pain with right-sided sciatica     ICD-10-CM: M54.41, G89.29 ICD-9-CM: 724.2, 724.3, 338.29 Polyarthralgia     ICD-10-CM: M25.50 ICD-9-CM: 719.49 Neuropathic pain     ICD-10-CM: M79.2 ICD-9-CM: 729.2 Fibromyalgia     ICD-10-CM: M79.7 ICD-9-CM: 729.1 Vitals BP Pulse Temp Resp Height(growth percentile) Weight(growth percentile)  (!) 131/96 (BP 1 Location: Left arm, BP Patient Position: Sitting) 88 97.1 °F (36.2 °C) 16 5' 9\" (1.753 m) 280 lb (127 kg) BMI OB Status Smoking Status 41.35 kg/m2 Having regular periods Former Smoker BMI and BSA Data Body Mass Index Body Surface Area  
 41.35 kg/m 2 2.49 m 2 Preferred Pharmacy Pharmacy Name Phone [address] [place] [address] [phone] Your Updated Medication List  
  
   
This list is accurate as of 6/28/18 12:51 PM.  Always use your most recent med list.  
  
  
  
  
 BENADRYL 25 mg capsule Generic drug:  diphenhydrAMINE Take 25 mg by mouth every six (6) hours as needed. BENTYL 20 mg tablet Generic drug:  dicyclomine Take 20 mg by mouth every six (6) hours. gabapentin 300 mg capsule Commonly known as:  NEURONTIN Take 1 capsule once in the evening for one week, then 2 times a day thereafter. #53  
  
 glimepiride 4 mg tablet Commonly known as:  AMARYL Take  by mouth every morning. HumaLOG Mix 75-25(U-100)Insuln 100 unit/mL (75-25) injection Generic drug:  insulin lispro protamine/insulin lispro  
by SubCUTAneous route. hydroCHLOROthiazide 25 mg tablet Commonly known as:  HYDRODIURIL Take 25 mg by mouth daily. * HYDROcodone-acetaminophen  mg tablet Commonly known as:  Ruffus Homme Take 1 Tab by mouth two (2) times a day for 30 days. Max Daily Amount: 2 Tabs. For severe pain flares Start taking on:  7/6/2018  
  
 * HYDROcodone-acetaminophen  mg tablet Commonly known as:  Ruffus Homme Take 1 Tab by mouth two (2) times a day for 30 days. Max Daily Amount: 2 Tabs. For severe pain flares Start taking on:  8/5/2018  
  
 * HYDROcodone-acetaminophen  mg tablet Commonly known as:  Ruffus Homme Take 1 Tab by mouth two (2) times a day for 30 days. Max Daily Amount: 2 Tabs. For severe pain flares Start taking on:  9/4/2018  
  
 ibuprofen 800 mg tablet Commonly known as:  MOTRIN Take  by mouth. nortriptyline 50 mg capsule Commonly known as:  PAMELOR Take 2 Caps by mouth nightly. Start with 1 cap qhs for migraines. increase to 2 qhs after 7 days if needed  
  
 promethazine 25 mg tablet Commonly known as:  PHENERGAN Take 25 mg by mouth every six (6) hours as needed for Nausea. PROzac 20 mg capsule Generic drug:  FLUoxetine Take  by mouth daily. topiramate 100 mg tablet Commonly known as:  TOPAMAX Take  by mouth two (2) times a day. TYLENOL EXTRA STRENGTH 500 mg tablet Generic drug:  acetaminophen Take  by mouth every six (6) hours as needed for Pain. venlafaxine-SR 37.5 mg capsule Commonly known as:  EFFEXOR-XR  
1 po qpm x 7 days, then 2 po qpm  
  
 * Notice: This list has 3 medication(s) that are the same as other medications prescribed for you. Read the directions carefully, and ask your doctor or other care provider to review them with you. Prescriptions Printed Refills HYDROcodone-acetaminophen (NORCO)  mg tablet 0 Starting on: 7/6/2018 Sig: Take 1 Tab by mouth two (2) times a day for 30 days. Max Daily Amount: 2 Tabs. For severe pain flares Class: Print Route: Oral  
 HYDROcodone-acetaminophen (NORCO)  mg tablet 0 Starting on: 8/5/2018 Sig: Take 1 Tab by mouth two (2) times a day for 30 days. Max Daily Amount: 2 Tabs. For severe pain flares Class: Print Route: Oral  
 HYDROcodone-acetaminophen (NORCO)  mg tablet 0 Starting on: 9/4/2018 Sig: Take 1 Tab by mouth two (2) times a day for 30 days. Max Daily Amount: 2 Tabs. For severe pain flares Class: Print Route: Oral  
  
We Performed the Following AMB POC DRUG SCREEN () [ [facility]] DRUG SCREEN [[id number] Custom] Follow-up Instructions Return in about 3 months (around 9/28/2018). Patient Instructions 1. Continue current plan with no evidence of addiction or diversion. Stable on current medication without adverse events. 2. Refill hydrocodone 10/325 mg up to 2 times daily as needed. 3. Discussed risks of addiction, dependency, and opioid induced hyperalgesia. Please remember to call at least 4-5 business days prior to your medication refill. Return to clinic in 3 months. Please call and cancel your appointment and pain management agreement if you do decide to transfer your care. Introducing [facility] SERVICES! Dear Geovanni Tejeda: Thank you for requesting a [employer] account. Our records indicate that you already have an active [employer] account. You can access your account anytime at [email or web address] Did you know that you can access your hospital and ER discharge instructions at any time in [employer]? You can also review all of your test results from your hospital stay or ER visit. Additional Information If you have questions, please visit the Frequently Asked Questions section of the [employer] website at [email or web address] Remember, [employer] is NOT to be used for urgent needs. For medical emergencies, dial 911. Now available from your iPhone and Android! Please provide this summary of care documentation to your next provider. Your primary care clinician is listed as Emilio Spencer. If you have any questions after today's visit, please call [phone].

## 2018-06-28 NOTE — PROGRESS NOTES
Nursing Notes    Patient presents to the office today in follow-up. Patient rates her pain at 3/10 on the numerical pain scale. Reviewed medications with counts as follows:    Rx Date filled Qty Dispensed Pill Count Last Dose Short   norco 10 mg 06/07/18 60 34 This am  no         Comments: Patient is here today for a follow up appt today she states her pain level today is a 3  PHQ 9 was done. Patient is taking antidepressant meds     POC UDS was performed in office today per verbal order per [facility]    Any new labs or imaging since last appointment? NO    Have you been to an emergency room (ER) or urgent care clinic since your last visit? NO            Have you been hospitalized since your last visit? NO     If yes, where, when, and reason for visit? Have you seen or consulted any other health care providers outside of the [address] [place] [address]  since your last visit? NO     If yes, where, when, and reason for visit? Ms. Kirkpatrick Click has a reminder for a \"due or due soon\" health maintenance. I have asked that she contact her primary care provider for follow-up on this health maintenance.

## 2018-06-28 NOTE — PROGRESS NOTES
HISTORY OF PRESENT ILLNESS  Khai Painter is a 37 y.o. female    HPI: Ms. Shamar Yo  returns today for f/u of chronic, severe widespread pain due to fibromyalgia and chronic low back pain. Ms. Shamar Yo continues unchanged since last visit. She has been doing well with her current treatment plan which has been offering significant pain control. We did discontinue gabapentin due to side effects. She has been doing well without this medication. She also reports that she is now receiving Pamelor from her PCP. We spent most of our visit today discussing changes to our practice. Explained her that moving forward we will be focusing on a more conservative and non-opioid plan of care. This will result in changes to her current treatment plan. We will continue with her hydrocodone with no changes today but she understands it we will begin tapering this medication next visit. She has expressed her interest in transferring her care but is currently self-pay and does not have many options. However follow-up in 3 months for further evaluation and recommendation we will discuss further options at that time. Current medication management includes hydrocodone 10/325 mg twice daily as needed and gabapentin 300 mg every 12 hours. Medications are helping with pain control and quality of life. Her pain is 4/10 with medication and 6/10 without. Pt describes pain as crushing. Aggravating factors include walking and bending. Relieved with rest, medication, and avoiding painful activities. Current treatment is helping to improve general activity, mood, walking, sleep, enjoyment of life    Ms. Shamar Yo is tolerating medications well, with no side effects noted. She is able to stay more active with less discomfort with these current doses. The patient reports an average of 10-20% pain relief with current treatment/medications.    She is informed of side effects, risks, and benefits of this regimen, and emphasizes that she derives a significant improvement in functionality and quality of life, and notes that non-opioid medications and therapies in the past have not offered significant benefit. She  is otherwise doing well with no other complaints today. She denies any adverse events including nausea, vomiting, dizziness, increased constipation, hallucinations, or seizures. MME: 20  COMM: 3  OSWESTRY: 30 %  ORT: 0  PMA updated: 6/28/2018  POC UDS today. Confirmation pending. Allergies   Allergen Reactions    Aspirin Other (comments)     Chest pain    Darvocet A500 [Propoxyphene N-Acetaminophen] Hives    Pcn [Penicillins] Hives    Sulfur Hives       Past Surgical History:   Procedure Laterality Date    HX CHOLECYSTECTOMY      HX GYN  1990    c section    HX ORTHOPAEDIC  1996    pelvic repair         Review of Systems   Constitutional: Negative for chills, fever and weight loss. HENT: Negative for congestion and sore throat. Eyes: Negative for blurred vision and double vision. Respiratory: Negative for cough, shortness of breath and wheezing. Cardiovascular: Negative for chest pain and palpitations. Gastrointestinal: Negative for abdominal pain, constipation, diarrhea, heartburn, nausea and vomiting. Genitourinary: Negative. Musculoskeletal: Positive for back pain and joint pain. Negative for neck pain. Neurological: Positive for headaches. Negative for dizziness, seizures and loss of consciousness. Endo/Heme/Allergies: Does not bruise/bleed easily. Psychiatric/Behavioral: Negative for depression. The patient is not nervous/anxious and does not have insomnia. Physical Exam   Constitutional: She is oriented to person, place, and time and well-developed, well-nourished, and in no distress. No distress. HENT:   Head: Normocephalic and atraumatic. Eyes: EOM are normal.   Pulmonary/Chest: Effort normal.   Neurological: She is alert and oriented to person, place, and time.    Skin: Skin is warm and dry. No rash noted. She is not diaphoretic. No erythema. Psychiatric: Mood, memory, affect and judgment normal.   Nursing note and vitals reviewed. ASSESSMENT:    1. Encounter for long-term (current) use of high-risk medication    2. Chronic bilateral low back pain with right-sided sciatica    3. Polyarthralgia    4. Neuropathic pain    5. Fibromyalgia         [place] Prescription Monitoring Program was reviewed which does not demonstrate aberrancies and/or inconsistencies with regard to the historical prescribing of controlled medications to this patient by other providers. PLAN / Pt Instructions:  1. Continue current plan with no evidence of addiction or diversion. Stable on current medication without adverse events. 2. Refill hydrocodone 10/325 mg up to 2 times daily as needed. 3. Discussed risks of addiction, dependency, and opioid induced hyperalgesia. Please remember to call at least 4-5 business days prior to your medication refill. Return to clinic in 3 months. Please call and cancel your appointment and pain management agreement if you do decide to transfer your care. Medications Ordered Today   Medications    HYDROcodone-acetaminophen (NORCO)  mg tablet     Sig: Take 1 Tab by mouth two (2) times a day for 30 days. Max Daily Amount: 2 Tabs. For severe pain flares     Dispense:  60 Tab     Refill:  0    HYDROcodone-acetaminophen (NORCO)  mg tablet     Sig: Take 1 Tab by mouth two (2) times a day for 30 days. Max Daily Amount: 2 Tabs. For severe pain flares     Dispense:  60 Tab     Refill:  0    HYDROcodone-acetaminophen (NORCO)  mg tablet     Sig: Take 1 Tab by mouth two (2) times a day for 30 days. Max Daily Amount: 2 Tabs. For severe pain flares     Dispense:  60 Tab     Refill:  0         DISPOSITION     Pain medications are prescribed with the objective of pain relief and improved physical and psychosocial function in this patient.     Pain Meds and Quality Of Life have been reviewed. Nonpharmacologic therapy and non-opioid pharmacologic therapy have been considered. Opioid therapy is only prescribed if the expected benefits are anticipated to outweigh risks.  Counseled patient on proper use of prescribed medications.  Reviewed with patient self-help tools, home exercise, and lifestyle changes to assist the patient in self-management of symptoms.  Reviewed with patient the treatment plan, goals of treatment plan, and limitations of treatment plan, to include the potential for side effects from medications and procedures. If side effects occur, it is the responsibility of the patient to inform the clinic so that a change in the treatment plan can be made in a safe manner. The patient is advised that stopping prescribed medication may cause an increase in symptoms and possible medication withdrawal symptoms. The patient is informed an emergency room evaluation may be necessary if this occurs. Spent 25 minutes with patient today which more than 50% of that time was spent on counseling and coordination of care. Johnny Paredes 6/28/2018        Note: Please excuse any typographical errors. Voice recognition software was used for this note and may cause mistakes.

## 2018-08-09 ENCOUNTER — TELEPHONE (OUTPATIENT)
Dept: PAIN MANAGEMENT | Age: 43
End: 2018-08-09

## 2018-08-09 NOTE — TELEPHONE ENCOUNTER
Cinthia Gitelman has called requesting a refill of their controlled medication, norco, for the management of his chronic back and leg pain. Last office visit date: 06/28/18    Date last  was pulled and reviewed : 08/09/18, last fill date for norco was 07/13/18    Was the patient compliant when the above report was pulled? yes    Analgesia: pt reports a 70% pain relief on her current opioid medication regimen    Aberrancies: none noted     ADL's: pt reports being able to perform normal daily duties while taking her medication     Adverse Reaction: none reported by the pt at this time. Provider's last note and plan of care reviewed? Yes, pre-printed prescription. Provider gives the verbal order for the pt to  her prescription. The verbal order was RBV'd. Request forwarded to provider for review.

## 2018-09-12 DIAGNOSIS — G89.29 CHRONIC BILATERAL LOW BACK PAIN WITH RIGHT-SIDED SCIATICA: ICD-10-CM

## 2018-09-12 DIAGNOSIS — M25.50 POLYARTHRALGIA: ICD-10-CM

## 2018-09-12 DIAGNOSIS — M54.41 CHRONIC BILATERAL LOW BACK PAIN WITH RIGHT-SIDED SCIATICA: ICD-10-CM

## 2018-09-12 RX ORDER — HYDROCODONE BITARTRATE AND ACETAMINOPHEN 10; 325 MG/1; MG/1
1 TABLET ORAL 2 TIMES DAILY
Qty: 60 TAB | Refills: 0 | Status: SHIPPED | OUTPATIENT
Start: 2018-10-07 | End: 2018-11-08 | Stop reason: SDUPTHER

## 2018-09-12 NOTE — TELEPHONE ENCOUNTER
Velvet Councilman has called requesting a refill of their controlled medication, Norco, for the management of chronic pain. Last office visit date: 06/28/18    Date last  was pulled and reviewed : 09/12/18    Was the patient compliant when the above report was pulled? yes    Analgesia: 70% relief with medications    Aberrancies: none    ADL's: able to perform with medications    Adverse Reaction: patient denies    Provider's last note and plan of care reviewed? yes  Request forwarded to provider for review.

## 2018-10-11 ENCOUNTER — TELEPHONE (OUTPATIENT)
Dept: PAIN MANAGEMENT | Age: 43
End: 2018-10-11

## 2018-10-11 NOTE — TELEPHONE ENCOUNTER
Attempted to contact patient regarding prescription ; no answer noted at number given; unable to leave vm ; mail box full. Patient needs appointment September cancelled for weather. Patient prescription just filled 10/9/18, refill on November 8/18.

## 2018-10-19 ENCOUNTER — TELEPHONE (OUTPATIENT)
Dept: PAIN MANAGEMENT | Age: 43
End: 2018-10-19

## 2018-10-19 NOTE — TELEPHONE ENCOUNTER
Received call from patient requesting refill; attempted to call patient to advise that patient needs appt , not refill and that patient just recently filled on 10/09/18; no answer at number given; vm left to contact triage line.

## 2018-11-08 ENCOUNTER — OFFICE VISIT (OUTPATIENT)
Dept: PAIN MANAGEMENT | Age: 43
End: 2018-11-08

## 2018-11-08 VITALS
SYSTOLIC BLOOD PRESSURE: 122 MMHG | TEMPERATURE: 97.7 F | HEIGHT: 69 IN | BODY MASS INDEX: 42.36 KG/M2 | RESPIRATION RATE: 16 BRPM | HEART RATE: 93 BPM | WEIGHT: 286 LBS | DIASTOLIC BLOOD PRESSURE: 80 MMHG

## 2018-11-08 DIAGNOSIS — Z79.899 ENCOUNTER FOR LONG-TERM (CURRENT) USE OF HIGH-RISK MEDICATION: ICD-10-CM

## 2018-11-08 DIAGNOSIS — G89.29 CHRONIC BILATERAL LOW BACK PAIN, WITH SCIATICA PRESENCE UNSPECIFIED: ICD-10-CM

## 2018-11-08 DIAGNOSIS — M79.7 FIBROMYALGIA: ICD-10-CM

## 2018-11-08 DIAGNOSIS — M25.50 POLYARTHRALGIA: ICD-10-CM

## 2018-11-08 DIAGNOSIS — G89.4 CHRONIC PAIN SYNDROME: Primary | ICD-10-CM

## 2018-11-08 DIAGNOSIS — M54.5 CHRONIC BILATERAL LOW BACK PAIN, WITH SCIATICA PRESENCE UNSPECIFIED: ICD-10-CM

## 2018-11-08 RX ORDER — NALOXONE HYDROCHLORIDE 2 MG/.4ML
2 INJECTION, SOLUTION INTRAMUSCULAR; SUBCUTANEOUS
Qty: 1 DEVICE | Refills: 0 | Status: SHIPPED | OUTPATIENT
Start: 2018-11-08 | End: 2018-11-08 | Stop reason: CLARIF

## 2018-11-08 RX ORDER — CAPSAICIN 0.1 %
CREAM (GRAM) TOPICAL
Qty: 42.5 G | Refills: 2 | Status: SHIPPED | OUTPATIENT
Start: 2018-11-08 | End: 2019-11-04 | Stop reason: SDUPTHER

## 2018-11-08 RX ORDER — NALOXONE HYDROCHLORIDE 2 MG/.4ML
2 INJECTION, SOLUTION INTRAMUSCULAR; SUBCUTANEOUS
Qty: 1 DEVICE | Refills: 0 | Status: SHIPPED | OUTPATIENT
Start: 2018-11-08 | End: 2018-11-08

## 2018-11-08 RX ORDER — HYDROCODONE BITARTRATE AND ACETAMINOPHEN 10; 325 MG/1; MG/1
1 TABLET ORAL 2 TIMES DAILY
Qty: 55 TAB | Refills: 0 | Status: SHIPPED | OUTPATIENT
Start: 2018-11-08 | End: 2019-01-22 | Stop reason: SDUPTHER
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
## 2018-11-08 NOTE — PROGRESS NOTES
Nursing Notes    Patient presents to the office today in follow-up. Patient rates her pain at 6/10 on the numerical pain scale. Reviewed medications with counts as follows:    Rx Date filled Qty Dispensed Pill Count Last Dose Short   Norco 10 mg 108/09/18 60 18 This am  21       Last opioid agreement 06/28/18  Last urine drug screen 11/08/18    Comments:  Patient is here today for a follow up appt today she states her pain level today is a 6  PHQ 2 was done patient denies any depression. She states she had had a mammogram since her last appt here. POC UDS was performed in office today per verbal order per [place]    Any new labs or imaging since last appointment? YES mammogram     Have you been to an emergency room (ER) or urgent care clinic since your last visit? NO            Have you been hospitalized since your last visit? NO     If yes, where, when, and reason for visit? Have you seen or consulted any other health care providers outside of the [address] [place] [address]  since your last visit? NO     If yes, where, when, and reason for visit? Ms. Dyllan Villanueva has a reminder for a \"due or due soon\" health maintenance. I have asked that she contact her primary care provider for follow-up on this health maintenance.

## 2018-11-08 NOTE — PATIENT INSTRUCTIONS
Safe Use of Opioid Pain Medicine: Care Instructions  Your Care Instructions  Pain is your body's way of warning you that something is wrong. Pain feels different for everybody. Only you can describe your pain. A doctor can suggest or prescribe many types of medicines for pain. These range from over-the-counter medicines like acetaminophen (Tylenol) to powerful medicines called opioids. Examples of opioids are fentanyl, hydrocodone, morphine, and oxycodone. Heroin is an illegal opioid  Opioids are strong medicines. They can help you manage pain when you use them the right way. But if you misuse them, they can cause serious harm and even death. For these reasons, doctors are very careful about how they prescribe opioids. If you decide to take opioids, here are some things to remember. · Keep your doctor informed. You can get addicted to opioids. The risk is higher if you have a history of substance use. Your doctor will monitor you closely for signs of misuse and addiction and to figure out when you no longer need to take opioids. · Make a treatment plan. The goal of your plan is to be able to function and do the things you need to do, even if you still have some pain. You might be able to manage your pain with other non-opioid options like physical therapy, relaxation, or over-the-counter pain medicines. · Be aware of the side effects. Opioids can cause serious side effects, such as constipation, dry mouth, and nausea. And over time, you may need a higher dose to get pain relief. This is called tolerance. Your body also gets used to opioids. This is called physical dependence. If you suddenly stop taking them, you may have withdrawal symptoms. The doctor carefully considered what pain medicine is right for you. You may not have received opioids if your doctor was concerned about drug interactions or your safety, or if he or she had other concerns. It is best to have one doctor or clinic treat your pain. This way you will get the pain medicine that will help you the most. And a doctor will be able to watch for any problems that the medicine might cause. The doctor has checked you carefully, but problems can develop later. If you notice any problems or new symptoms,  get medical treatment right away. Follow-up care is a key part of your treatment and safety. Be sure to make and go to all appointments, and call your doctor if you are having problems. It's also a good idea to know your test results and keep a list of the medicines you take. How can you care for yourself at home? · If you need to take opioids to manage your pain, remember these safety tips. ? Follow directions carefully. It's easy to misuse opioids if you take a dose other than what's prescribed by your doctor. This can lead to overdose and even death. Even sharing them with someone they weren't meant for is misuse. ? Be cautious. Opioids may affect your judgment and decision making. Do not drive or operate machinery until you can think clearly. Talk with your doctor about when it is safe to drive. ? Reduce the risk of drug interactions. Opioids can be dangerous if you take them with alcohol or with certain drugs like sleeping pills and muscle relaxers. Make sure your doctor knows about all the other medicines you take, including over-the-counter medicines. Don't start any new medicines before you talk to your doctor or pharmacist.  ? Keep others safe. Store opioids in a safe and secure place. Make sure that pets, children, friends, and family can't get to them. When you're done using opioids, make sure to properly dispose of them. You can either use a community drug take-back program or your drugstore's mail-back program. If one of these programs isn't available, you can flush opioid skin patches and unused opioid pills down the toilet. ? Reduce the risk of overdose. Misuse of opioids can be very dangerous.  Protect yourself by asking your doctor about a naloxone rescue kit. It can help you--and even save your life--if you take too much of an opioid. · Try other ways to reduce pain. ? Relax, and reduce stress. Relaxation techniques such as deep breathing or meditation can help. ? Keep moving. Gentle, daily exercise can help reduce pain over the long run. Try low- or no-impact exercises such as walking, swimming, and stationary biking. Do stretches to stay flexible. ? Try heat, cold packs, and massage. ? Get enough sleep. Pain can make you tired and drain your energy. Talk with your doctor if you have trouble sleeping because of pain. ? Think positive. Your thoughts can affect your pain level. Do things that you enjoy to distract yourself when you have pain instead of focusing on the pain. See a movie, read a book, listen to music, or spend time with a friend. · If you are not taking a prescription pain medicine, ask your doctor if you can take an over-the-counter medicine. When should you call for help? Call your doctor now or seek immediate medical care if:    · You have a new kind of pain.     · You have new symptoms, such as a fever or rash, along with the pain.    Watch closely for changes in your health, and be sure to contact your doctor if:    · You think you might be using too much pain medicine, and you need help to use less or stop.     · Your pain gets worse.     · You would like a referral to a doctor or clinic that specializes in pain management. Where can you learn more? Go to http://marquis-jocelyn.info/. Enter R108 in the search box to learn more about \"Safe Use of Opioid Pain Medicine: Care Instructions. \"  Current as of: September 10, 2017  Content Version: 11.8  © 3339-0628 [employer]. Care instructions adapted under license by [employer] (which disclaims liability or warranty for this information).  If you have questions about a medical condition or this instruction, always ask your healthcare professional. [place] [address] any warranty or liability for your use of this information.

## 2018-12-06 ENCOUNTER — TELEPHONE (OUTPATIENT)
Dept: PAIN MANAGEMENT | Age: 43
End: 2018-12-06

## 2018-12-06 NOTE — TELEPHONE ENCOUNTER
Patient LVM on nurse triage line requesting a refill of her Norco, which provides 50% pain relief, allows her to perform her daily activities, and causes no side effects. Home number verified. Last OV 11/08/18, next OV 02/07/19.

## 2018-12-12 NOTE — TELEPHONE ENCOUNTER
Patient called the nurse triage line stating that no one called her back regarding refill request. I then was processing refill request, but upon pulling , I saw that patient filled Norco 10/325 mg tab on 12/5/18, and the Rx was written on 9/12/18. Attempted to contact patient to ask about refill request and why she was requesting it, but patient did not answer the phone and therefore had to leave a message for her to call the office back. Clinic number provided. no

## 2018-12-14 NOTE — TELEPHONE ENCOUNTER
Called patient back, Patient identified using two patient identifiers (name and ). I informed patient of the situation, and the patient stated she did fill her prescription as well as still having the prescription from her last visit, but wasn't sure if she still needed to call in monthly because she didn't want to run out. I told the patient that they should call a week in advance before their next fill date, so we have time to process prescription refill request and not run out. Patient verbalized understanding.

## 2019-01-09 ENCOUNTER — TELEPHONE (OUTPATIENT)
Dept: PAIN MANAGEMENT | Age: 44
End: 2019-01-09

## 2019-01-09 NOTE — TELEPHONE ENCOUNTER
10:13 am I called and LVM for patient to call back. Telephone number provided. The reason for this call is because we got a request for PA on her medication (Hydrocodone)  This patient has Care card,  as a primary. The pharmacy has provided a third party insurance. \"[facility]\" We wanted to know if this insurance is [address] [facility]? ? Because we aren't par with it. The onLy Medicaid that we process is the [facility] MEDICAID\"  The patient may have to pay out of pocket for her medication.

## 2019-01-09 NOTE — TELEPHONE ENCOUNTER
03:37 pm I spoke to Ms Elio Hutchins and she stated that she had paid for refill because it only cost $14.00. She also stated that she has new insurance and it is [employer] [place] from [facility]. I gather all the info and it was entered in [place]. Furthermore; she also stated that in this case she doesn't consider necessary to bother with PA for her Oxycodone.

## 2019-01-22 ENCOUNTER — OFFICE VISIT (OUTPATIENT)
Dept: PAIN MANAGEMENT | Age: 44
End: 2019-01-22

## 2019-01-22 VITALS
HEIGHT: 69 IN | DIASTOLIC BLOOD PRESSURE: 76 MMHG | TEMPERATURE: 97.3 F | OXYGEN SATURATION: 98 % | RESPIRATION RATE: 18 BRPM | BODY MASS INDEX: 41.92 KG/M2 | HEART RATE: 105 BPM | WEIGHT: 283 LBS | SYSTOLIC BLOOD PRESSURE: 148 MMHG

## 2019-01-22 DIAGNOSIS — M25.50 POLYARTHRALGIA: ICD-10-CM

## 2019-01-22 DIAGNOSIS — M54.5 CHRONIC BILATERAL LOW BACK PAIN, WITH SCIATICA PRESENCE UNSPECIFIED: ICD-10-CM

## 2019-01-22 DIAGNOSIS — Z79.899 ENCOUNTER FOR LONG-TERM (CURRENT) USE OF HIGH-RISK MEDICATION: ICD-10-CM

## 2019-01-22 DIAGNOSIS — G89.4 CHRONIC PAIN SYNDROME: Primary | ICD-10-CM

## 2019-01-22 DIAGNOSIS — G89.29 CHRONIC BILATERAL LOW BACK PAIN, WITH SCIATICA PRESENCE UNSPECIFIED: ICD-10-CM

## 2019-01-22 DIAGNOSIS — M79.7 FIBROMYALGIA: ICD-10-CM

## 2019-01-22 RX ORDER — HYDROCODONE BITARTRATE AND ACETAMINOPHEN 10; 325 MG/1; MG/1
1 TABLET ORAL 2 TIMES DAILY
Qty: 50 TAB | Refills: 0 | Status: SHIPPED | OUTPATIENT
Start: 2019-02-05 | End: 2019-03-01 | Stop reason: SDUPTHER

## 2019-01-22 RX ORDER — NALOXONE HYDROCHLORIDE 4 MG/.1ML
SPRAY NASAL
Qty: 1 EACH | Refills: 0 | Status: SHIPPED | OUTPATIENT
Start: 2019-01-22 | End: 2019-03-01 | Stop reason: SDUPTHER

## 2019-01-22 RX ORDER — PREGABALIN 50 MG/1
50 CAPSULE ORAL 2 TIMES DAILY
Qty: 60 CAP | Refills: 0 | Status: SHIPPED | OUTPATIENT
Start: 2019-01-22 | End: 2019-02-21

## 2019-01-22 NOTE — PROGRESS NOTES
Nursing Notes    Patient presents to the office today in follow-up. Patient rates her pain at 8/10 on the numerical pain scale. Reviewed medications with counts as follows:    Rx Date filled Qty Dispensed Pill Count Last Dose Short   Norco 10 mg 01/07/19 55 35 This am  no       Last opioid agreement 06/28/18  Last urine drug screen 11/08/18    Comments:  Patient is here today for a follow up appt today for a follow up appt today for her chronic generalized body aches     POC UDS was not performed in office today    Any new labs or imaging since last appointment? NO    Have you been to an emergency room (ER) or urgent care clinic since your last visit? NO            Have you been hospitalized since your last visit? NO     If yes, where, when, and reason for visit? Have you seen or consulted any other health care providers outside of the [address] [place] [address]  since your last visit? YES     If yes, where, when, and reason for visit? Ms. Cher Guidry has a reminder for a \"due or due soon\" health maintenance. I have asked that she contact her primary care provider for follow-up on this health maintenance.

## 2019-01-22 NOTE — PATIENT INSTRUCTIONS
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
## 2019-02-01 ENCOUNTER — OFFICE VISIT (OUTPATIENT)
Dept: SURGERY | Age: 44
End: 2019-02-01

## 2019-02-01 VITALS
DIASTOLIC BLOOD PRESSURE: 86 MMHG | SYSTOLIC BLOOD PRESSURE: 136 MMHG | RESPIRATION RATE: 18 BRPM | WEIGHT: 284 LBS | BODY MASS INDEX: 44.57 KG/M2 | OXYGEN SATURATION: 99 % | TEMPERATURE: 97.9 F | HEART RATE: 106 BPM | HEIGHT: 67 IN

## 2019-02-01 DIAGNOSIS — K91.2 POSTOPERATIVE MALABSORPTION: ICD-10-CM

## 2019-02-01 DIAGNOSIS — E66.9 DIABETES MELLITUS TYPE 2 IN OBESE (HCC): ICD-10-CM

## 2019-02-01 DIAGNOSIS — M79.7 FIBROMYALGIA: ICD-10-CM

## 2019-02-01 DIAGNOSIS — E66.01 MORBID OBESITY WITH BMI OF 40.0-44.9, ADULT (HCC): Primary | ICD-10-CM

## 2019-02-01 DIAGNOSIS — E11.69 DIABETES MELLITUS TYPE 2 IN OBESE (HCC): ICD-10-CM

## 2019-02-01 NOTE — COMMUNICATION BODY
Initial Consultation for Bariatric Surgery Template (Gastric Bypass) Twyla Sparks is a 37 y.o. female who comes into the office today for initial consultation for the surgical options for the treatment of morbid obesity. The patient initially identified obesity at the age of 19 and at age 23 weighed 170lbs. She has tried a variety of unsupervised weight-loss attempts including registered dietician, but has yet to meet with lasting success. Maximum weight lost on a diet is about 30 lbs, but that the weight loss always seems to return. Today, the patient is  Height: 5' 7\" (170.2 cm) tall, Weight: 128.8 kg (284 lb) lbs for a Body mass index is 44.48 kg/m². It is due to the patient's severe obesity, which is further complicated by adult onset diabetes mellitus and hypertension  that the patient is now seeking out bariatric surgery, specifically, gastric bypass. Past Medical History:  
Diagnosis Date  Diabetes ([address]  Essential hypertension  Neurological disorder   
 migraines Past Surgical History:  
Procedure Laterality Date  HX CHOLECYSTECTOMY [facility]  
 c section  HX ORTHOPAEDIC  1996  
 pelvic repair Current Outpatient Medications Medication Sig Dispense Refill  [START ON 2/5/2019] HYDROcodone-acetaminophen (NORCO)  mg tablet Take 1 Tab by mouth two (2) times a day for 30 days. Max Daily Amount: 2 Tabs. 50 tabs to be budgeted over 30 days 50 Tab 0  
 capsaicin (CAPZASIN-HP) 0.1 % topical cream Apply cream to lower back and lower legs TID. Avoid use on damaged, broken or irritated skin. Avoid occlusive dressings or heat while using this medication. 42.5 g 2  
 nortriptyline (PAMELOR) 50 mg capsule Take 2 Caps by mouth nightly. Start with 1 cap qhs for migraines. increase to 2 qhs after 7 days if needed (Patient taking differently: Take 100 mg by mouth nightly. Start with 1 cap qhs for migraines.  increase to 2 qhs after 7 days if needed) 60 Cap 5  
  insulin lispro protamine/insulin lispro (HUMALOG MIX 75-25,U-100,INSULN) 100 unit/mL (75-25) injection by SubCUTAneous route.  glimepiride (AMARYL) 4 mg tablet Take  by mouth every morning.  topiramate (TOPAMAX) 100 mg tablet Take  by mouth two (2) times a day.  promethazine (PHENERGAN) 25 mg tablet Take 25 mg by mouth every six (6) hours as needed for Nausea.  dicyclomine (BENTYL) 20 mg tablet Take 20 mg by mouth every six (6) hours.  acetaminophen (TYLENOL EXTRA STRENGTH) 500 mg tablet Take  by mouth every six (6) hours as needed for Pain.  hydroCHLOROthiazide (HYDRODIURIL) 25 mg tablet Take 25 mg by mouth daily.  pregabalin (LYRICA) 50 mg capsule Take 1 Cap by mouth two (2) times a day for 30 days. Max Daily Amount: 100 mg. 60 Cap 0  
 naloxone (NARCAN) 4 mg/actuation nasal spray Use 1 spray intranasally into 1 nostril as needed for opioid respiratory emergency only. 1 Each 0 Allergies Allergen Reactions  Aspirin Other (comments) Chest pain  Darvocet A500 [Propoxyphene N-Acetaminophen] Hives  Pcn [Penicillins] Hives  Sulfur Hives Social History Tobacco Use  Smoking status: Former Smoker Last attempt to quit: 2/1/2016 Years since quitting: 3.0  Smokeless tobacco: Never Used Substance Use Topics  Alcohol use: No  
 Drug use: No  
 
 
Family History Problem Relation Age of Onset  Hypertension Mother  Diabetes Mother  Hypertension Father  Diabetes Father Family Status Relation Name Status  Mother  Alive  Father  Alive Review of Systems:  Positive in BOLD 
 
CONST: Fever, weight loss, fatigue or chills GI: Nausea, vomiting, abdominal pain, change in bowel habits, hematochezia, melena, and GERD INTEG: Dermatitis, abnormal moles HEENT: Recent changes in vision, vertigo, epistaxis, dysphagia and hoarseness CV: Chest pain, palpitations, HTN, edema and varicosities RESP: Cough, shortness of breath, wheezing, hemoptysis, snoring and reactive airway disease : Hematuria, dysuria, frequency, urgency, nocturia and stress urinary incontinence MS: Weakness, joint pain and arthritis - hips and back ENDO: Diabetes, thyroid disease, polyuria, polydipsia, polyphagia, poor wound healing, heat intolerance, cold intolerance LYMPH/HEME: Anemia, bruising and history of blood transfusions NEURO: Dizziness, headache, fainting, seizures and stroke PSYCH: Anxiety and depression Physical Exam 
 
Visit Vitals /86 Pulse (!) 106 Temp 97.9 °F (36.6 °C) Resp 18 Ht 5' 7\" (1.702 m) Wt 128.8 kg (284 lb) SpO2 99% BMI 44.48 kg/m² Pre op weight: 284 EBW: 144 Wt loss to date: 0 General: 37 y.o.) female in no acute distress. Morbidly obese in abdomen - android pattern HEENT: Normocephalic, atraumatic, Pupils equal and reactive, nasopharynx clear, oropharynx clear and moist without lesions NECK: Supple, no lymphadenopathy, thyromegaly, carotid bruits or jugular venous distension. trachea midline RESP: Clear to auscultation bilaterally, no wheezes, rhonchi, or rales, normal respiratory excursion CV: Regular rate and rhythm, no murmurs, rubs or gallops. 3+/4 pulses in bilateral dorsalis pedis and posterior tibialis. No distal edema or varicosities. ABD: Soft, nontender, nondistended, normoactive bowel sounds, no hepatosplenomegaly, easily palpable costal margins, android distribution, healed lap scars, small supraumbilical epigastric hernia with incarcerated fat Extremities: Warm, well perfused, no tenderness or swelling, normal gait/station Neuro: Sensation and strength grossly intact and symmetrical 
Psych: Alert and oriented to person, place, and time.  
 
Impression: 
 
Aissatou Proctor is a 37 y.o. female who is suffering from morbid obesity with a BMI of 44  and comorbidities including adult onset diabetes mellitus, hypertension and weight related arthopathies  who would benefit from bariatric surgery. We have had an extensive discussion with regard to the risks, benefits and likely outcomes of the operation. We've discussed the restrictive and malabsorptive nature of the gastric bypass and compared and contrasted with the sleeve gastrectomy. The patient understands the likelihood of losing approximately 80% of their excess weight in 12 to 18 months. She also understands the risks including but not limited to bleeding, infection, need for reoperation, ulcers, leaks and strictures, bowel obstruction secondary to adhesions and internal hernias, DVT, PE, heart attack, stroke, and death. Patient also understands risks of inadequate weight loss, excess weight loss, vitamin insufficiency, protein malnutrition, excess skin, and loss of hair. We have reviewed the components of a successful postoperative course including requirement for a high protein, low carbohydrate diet, 60 oz a day of zero calorie liquids, daily vitamin supplementation, daily exercise, regular follow-up, and participation in support groups. At this time we will enroll the patient in our bariatric program, undertake routine laboratory evaluation, chest X-ray, EKG, possible UGI and evaluation by  nutritionist as well as psychologist and pending their satisfactory completion of the preop evaluation, plan to perform a gastric bypass.

## 2019-02-01 NOTE — LETTER
2/1/2019 1:44 PM 
 
Patient:  Dayan Bliss YOB: 1975 Date of Visit: 2/1/2019 Renée Vasquez MD 
[address] [facility] [address] [address] VIA Facsimile: [phone] Dear Renée Vasquez MD, Thank you for referring Ms. Amy Mireles to [place] [id number] for evaluation and treatment. Below are the relevant portions of my assessment and plan of care. Initial Consultation for Bariatric Surgery Template (Gastric Bypass) Dayan Bliss is a 37 y.o. female who comes into the office today for initial consultation for the surgical options for the treatment of morbid obesity. The patient initially identified obesity at the age of 19 and at age 23 weighed 170lbs. She has tried a variety of unsupervised weight-loss attempts including registered dietician, but has yet to meet with lasting success. Maximum weight lost on a diet is about 30 lbs, but that the weight loss always seems to return. Today, the patient is  Height: 5' 7\" (170.2 cm) tall, Weight: 128.8 kg (284 lb) lbs for a Body mass index is 44.48 kg/m². It is due to the patient's severe obesity, which is further complicated by adult onset diabetes mellitus and hypertension  that the patient is now seeking out bariatric surgery, specifically, gastric bypass. Past Medical History:  
Diagnosis Date  Diabetes ([address]  Essential hypertension  Neurological disorder   
 migraines Past Surgical History:  
Procedure Laterality Date  HX CHOLECYSTECTOMY [address]  
 c section  HX ORTHOPAEDIC  1996  
 pelvic repair Current Outpatient Medications Medication Sig Dispense Refill  [START ON 2/5/2019] HYDROcodone-acetaminophen (NORCO)  mg tablet Take 1 Tab by mouth two (2) times a day for 30 days. Max Daily Amount: 2 Tabs.  50 tabs to be budgeted over 30 days 50 Tab 0  
  capsaicin (CAPZASIN-HP) 0.1 % topical cream Apply cream to lower back and lower legs TID. Avoid use on damaged, broken or irritated skin. Avoid occlusive dressings or heat while using this medication. 42.5 g 2  
 nortriptyline (PAMELOR) 50 mg capsule Take 2 Caps by mouth nightly. Start with 1 cap qhs for migraines. increase to 2 qhs after 7 days if needed (Patient taking differently: Take 100 mg by mouth nightly. Start with 1 cap qhs for migraines. increase to 2 qhs after 7 days if needed) 60 Cap 5  
 insulin lispro protamine/insulin lispro (HUMALOG MIX 75-25,U-100,INSULN) 100 unit/mL (75-25) injection by SubCUTAneous route.  glimepiride (AMARYL) 4 mg tablet Take  by mouth every morning.  topiramate (TOPAMAX) 100 mg tablet Take  by mouth two (2) times a day.  promethazine (PHENERGAN) 25 mg tablet Take 25 mg by mouth every six (6) hours as needed for Nausea.  dicyclomine (BENTYL) 20 mg tablet Take 20 mg by mouth every six (6) hours.  acetaminophen (TYLENOL EXTRA STRENGTH) 500 mg tablet Take  by mouth every six (6) hours as needed for Pain.  hydroCHLOROthiazide (HYDRODIURIL) 25 mg tablet Take 25 mg by mouth daily.  pregabalin (LYRICA) 50 mg capsule Take 1 Cap by mouth two (2) times a day for 30 days. Max Daily Amount: 100 mg. 60 Cap 0  
 naloxone (NARCAN) 4 mg/actuation nasal spray Use 1 spray intranasally into 1 nostril as needed for opioid respiratory emergency only. 1 Each 0 Allergies Allergen Reactions  Aspirin Other (comments) Chest pain  Darvocet A500 [Propoxyphene N-Acetaminophen] Hives  Pcn [Penicillins] Hives  Sulfur Hives Social History Tobacco Use  Smoking status: Former Smoker Last attempt to quit: 2/1/2016 Years since quitting: 3.0  Smokeless tobacco: Never Used Substance Use Topics  Alcohol use: No  
 Drug use: No  
 
 
Family History Problem Relation Age of Onset  Hypertension Mother  Diabetes Mother  Hypertension Father  Diabetes Father Family Status Relation Name Status  Mother  Alive  Father  Alive Review of Systems:  Positive in BOLD 
 
CONST: Fever, weight loss, fatigue or chills GI: Nausea, vomiting, abdominal pain, change in bowel habits, hematochezia, melena, and GERD INTEG: Dermatitis, abnormal moles HEENT: Recent changes in vision, vertigo, epistaxis, dysphagia and hoarseness CV: Chest pain, palpitations, HTN, edema and varicosities RESP: Cough, shortness of breath, wheezing, hemoptysis, snoring and reactive airway disease : Hematuria, dysuria, frequency, urgency, nocturia and stress urinary incontinence MS: Weakness, joint pain and arthritis - hips and back ENDO: Diabetes, thyroid disease, polyuria, polydipsia, polyphagia, poor wound healing, heat intolerance, cold intolerance LYMPH/HEME: Anemia, bruising and history of blood transfusions NEURO: Dizziness, headache, fainting, seizures and stroke PSYCH: Anxiety and depression Physical Exam 
 
Visit Vitals /86 Pulse (!) 106 Temp 97.9 °F (36.6 °C) Resp 18 Ht 5' 7\" (1.702 m) Wt 128.8 kg (284 lb) SpO2 99% BMI 44.48 kg/m² Pre op weight: 284 EBW: 144 Wt loss to date: 0 General: 37 y.o.) female in no acute distress. Morbidly obese in abdomen - android pattern HEENT: Normocephalic, atraumatic, Pupils equal and reactive, nasopharynx clear, oropharynx clear and moist without lesions NECK: Supple, no lymphadenopathy, thyromegaly, carotid bruits or jugular venous distension. trachea midline RESP: Clear to auscultation bilaterally, no wheezes, rhonchi, or rales, normal respiratory excursion CV: Regular rate and rhythm, no murmurs, rubs or gallops. 3+/4 pulses in bilateral dorsalis pedis and posterior tibialis. No distal edema or varicosities.  
ABD: Soft, nontender, nondistended, normoactive bowel sounds, no hepatosplenomegaly, easily palpable costal margins, android distribution, healed lap scars, small supraumbilical epigastric hernia with incarcerated fat Extremities: Warm, well perfused, no tenderness or swelling, normal gait/station Neuro: Sensation and strength grossly intact and symmetrical 
Psych: Alert and oriented to person, place, and time. Impression: 
 
Lisy Valerio is a 37 y.o. female who is suffering from morbid obesity with a BMI of 44  and comorbidities including adult onset diabetes mellitus, hypertension and weight related arthopathies  who would benefit from bariatric surgery. We have had an extensive discussion with regard to the risks, benefits and likely outcomes of the operation. We've discussed the restrictive and malabsorptive nature of the gastric bypass and compared and contrasted with the sleeve gastrectomy. The patient understands the likelihood of losing approximately 80% of their excess weight in 12 to 18 months. She also understands the risks including but not limited to bleeding, infection, need for reoperation, ulcers, leaks and strictures, bowel obstruction secondary to adhesions and internal hernias, DVT, PE, heart attack, stroke, and death. Patient also understands risks of inadequate weight loss, excess weight loss, vitamin insufficiency, protein malnutrition, excess skin, and loss of hair. We have reviewed the components of a successful postoperative course including requirement for a high protein, low carbohydrate diet, 60 oz a day of zero calorie liquids, daily vitamin supplementation, daily exercise, regular follow-up, and participation in support groups. At this time we will enroll the patient in our bariatric program, undertake routine laboratory evaluation, chest X-ray, EKG, possible UGI and evaluation by  nutritionist as well as psychologist and pending their satisfactory completion of the preop evaluation, plan to perform a gastric bypass. Thank you very much for your referral of MsLv Aubrie Terrazas. If you have questions, please do not hesitate to call me. I look forward to following Ms. Joein along with you and will keep you updated as to her progress.   
 
 
 
 
Sincerely, 
 
 
Amanda Landis MD

## 2019-02-01 NOTE — PROGRESS NOTES
Chief Complaint Patient presents with  Advice Only  
  confirmed seminar Pt ID confirmed Weight Loss Metrics 2/1/2019 2/1/2019 1/22/2019 11/8/2018 6/28/2018 3/28/2018 1/3/2018 Pre op / Initial Wt 284 - - - - - - Today's Wt - 284 lb 283 lb 286 lb 280 lb 280 lb 280 lb BMI - 44.48 kg/m2 41.79 kg/m2 42.23 kg/m2 41.35 kg/m2 41.35 kg/m2 41.35 kg/m2 Ideal Body Wt 140 - - - - - - Excess Body Wt 144 - - - - - -  
Goal Wt 169 - - - - - - Wt loss to date 0 - - - - - -  
% Wt Loss 0 - - - - - -  
80% .2 - - - - - - Body mass index is 44.48 kg/m².

## 2019-02-01 NOTE — PROGRESS NOTES
Initial Consultation for Bariatric Surgery Template (Gastric Bypass) Dat Villalobos is a 37 y.o. female who comes into the office today for initial consultation for the surgical options for the treatment of morbid obesity. The patient initially identified obesity at the age of 19 and at age 23 weighed 170lbs. She has tried a variety of unsupervised weight-loss attempts including registered dietician, but has yet to meet with lasting success. Maximum weight lost on a diet is about 30 lbs, but that the weight loss always seems to return. Today, the patient is  Height: 5' 7\" (170.2 cm) tall, Weight: 128.8 kg (284 lb) lbs for a Body mass index is 44.48 kg/m². It is due to the patient's severe obesity, which is further complicated by adult onset diabetes mellitus and hypertension  that the patient is now seeking out bariatric surgery, specifically, gastric bypass. Past Medical History:  
Diagnosis Date  Diabetes ([address]  Essential hypertension  Neurological disorder   
 migraines Past Surgical History:  
Procedure Laterality Date  HX CHOLECYSTECTOMY [address] [facility]  
 c section  HX ORTHOPAEDIC  1996  
 pelvic repair Current Outpatient Medications Medication Sig Dispense Refill  [START ON 2/5/2019] HYDROcodone-acetaminophen (NORCO)  mg tablet Take 1 Tab by mouth two (2) times a day for 30 days. Max Daily Amount: 2 Tabs. 50 tabs to be budgeted over 30 days 50 Tab 0  
 capsaicin (CAPZASIN-HP) 0.1 % topical cream Apply cream to lower back and lower legs TID. Avoid use on damaged, broken or irritated skin. Avoid occlusive dressings or heat while using this medication. 42.5 g 2  
 nortriptyline (PAMELOR) 50 mg capsule Take 2 Caps by mouth nightly. Start with 1 cap qhs for migraines. increase to 2 qhs after 7 days if needed (Patient taking differently: Take 100 mg by mouth nightly. Start with 1 cap qhs for migraines.  increase to 2 qhs after 7 days if needed) 60 Cap 5  
  insulin lispro protamine/insulin lispro (HUMALOG MIX 75-25,U-100,INSULN) 100 unit/mL (75-25) injection by SubCUTAneous route.  glimepiride (AMARYL) 4 mg tablet Take  by mouth every morning.  topiramate (TOPAMAX) 100 mg tablet Take  by mouth two (2) times a day.  promethazine (PHENERGAN) 25 mg tablet Take 25 mg by mouth every six (6) hours as needed for Nausea.  dicyclomine (BENTYL) 20 mg tablet Take 20 mg by mouth every six (6) hours.  acetaminophen (TYLENOL EXTRA STRENGTH) 500 mg tablet Take  by mouth every six (6) hours as needed for Pain.  hydroCHLOROthiazide (HYDRODIURIL) 25 mg tablet Take 25 mg by mouth daily.  pregabalin (LYRICA) 50 mg capsule Take 1 Cap by mouth two (2) times a day for 30 days. Max Daily Amount: 100 mg. 60 Cap 0  
 naloxone (NARCAN) 4 mg/actuation nasal spray Use 1 spray intranasally into 1 nostril as needed for opioid respiratory emergency only. 1 Each 0 Allergies Allergen Reactions  Aspirin Other (comments) Chest pain  Darvocet A500 [Propoxyphene N-Acetaminophen] Hives  Pcn [Penicillins] Hives  Sulfur Hives Social History Tobacco Use  Smoking status: Former Smoker Last attempt to quit: 2/1/2016 Years since quitting: 3.0  Smokeless tobacco: Never Used Substance Use Topics  Alcohol use: No  
 Drug use: No  
 
 
Family History Problem Relation Age of Onset  Hypertension Mother  Diabetes Mother  Hypertension Father  Diabetes Father Family Status Relation Name Status  Mother  Alive  Father  Alive Review of Systems:  Positive in BOLD 
 
CONST: Fever, weight loss, fatigue or chills GI: Nausea, vomiting, abdominal pain, change in bowel habits, hematochezia, melena, and GERD INTEG: Dermatitis, abnormal moles HEENT: Recent changes in vision, vertigo, epistaxis, dysphagia and hoarseness CV: Chest pain, palpitations, HTN, edema and varicosities RESP: Cough, shortness of breath, wheezing, hemoptysis, snoring and reactive airway disease : Hematuria, dysuria, frequency, urgency, nocturia and stress urinary incontinence MS: Weakness, joint pain and arthritis - hips and back ENDO: Diabetes, thyroid disease, polyuria, polydipsia, polyphagia, poor wound healing, heat intolerance, cold intolerance LYMPH/HEME: Anemia, bruising and history of blood transfusions NEURO: Dizziness, headache, fainting, seizures and stroke PSYCH: Anxiety and depression Physical Exam 
 
Visit Vitals /86 Pulse (!) 106 Temp 97.9 °F (36.6 °C) Resp 18 Ht 5' 7\" (1.702 m) Wt 128.8 kg (284 lb) SpO2 99% BMI 44.48 kg/m² Pre op weight: 284 EBW: 144 Wt loss to date: 0 General: 37 y.o.) female in no acute distress. Morbidly obese in abdomen - android pattern HEENT: Normocephalic, atraumatic, Pupils equal and reactive, nasopharynx clear, oropharynx clear and moist without lesions NECK: Supple, no lymphadenopathy, thyromegaly, carotid bruits or jugular venous distension. trachea midline RESP: Clear to auscultation bilaterally, no wheezes, rhonchi, or rales, normal respiratory excursion CV: Regular rate and rhythm, no murmurs, rubs or gallops. 3+/4 pulses in bilateral dorsalis pedis and posterior tibialis. No distal edema or varicosities. ABD: Soft, nontender, nondistended, normoactive bowel sounds, no hepatosplenomegaly, easily palpable costal margins, android distribution, healed lap scars, small supraumbilical epigastric hernia with incarcerated fat Extremities: Warm, well perfused, no tenderness or swelling, normal gait/station Neuro: Sensation and strength grossly intact and symmetrical 
Psych: Alert and oriented to person, place, and time.  
 
Impression: 
 
Charly Bound is a 37 y.o. female who is suffering from morbid obesity with a BMI of 44  and comorbidities including adult onset diabetes mellitus, hypertension and weight related arthopathies  who would benefit from bariatric surgery. We have had an extensive discussion with regard to the risks, benefits and likely outcomes of the operation. We've discussed the restrictive and malabsorptive nature of the gastric bypass and compared and contrasted with the sleeve gastrectomy. The patient understands the likelihood of losing approximately 80% of their excess weight in 12 to 18 months. She also understands the risks including but not limited to bleeding, infection, need for reoperation, ulcers, leaks and strictures, bowel obstruction secondary to adhesions and internal hernias, DVT, PE, heart attack, stroke, and death. Patient also understands risks of inadequate weight loss, excess weight loss, vitamin insufficiency, protein malnutrition, excess skin, and loss of hair. We have reviewed the components of a successful postoperative course including requirement for a high protein, low carbohydrate diet, 60 oz a day of zero calorie liquids, daily vitamin supplementation, daily exercise, regular follow-up, and participation in support groups. At this time we will enroll the patient in our bariatric program, undertake routine laboratory evaluation, chest X-ray, EKG, possible UGI and evaluation by  nutritionist as well as psychologist and pending their satisfactory completion of the preop evaluation, plan to perform a gastric bypass.

## 2019-02-03 LAB — UREA BREATH TEST QL: NEGATIVE

## 2019-02-06 ENCOUNTER — HOSPITAL ENCOUNTER (OUTPATIENT)
Dept: CT IMAGING | Age: 44
Discharge: HOME | End: 2019-02-06
Attending: PHYSICIAN ASSISTANT
Payer: MEDICAID

## 2019-02-06 DIAGNOSIS — R10.9 ABDOMINAL PAIN: ICD-10-CM

## 2019-02-06 LAB — CREAT UR-MCNC: 0.6 MG/DL (ref 0.6–1.3)

## 2019-02-06 PROCEDURE — 74177 CT ABD & PELVIS W/CONTRAST: CPT

## 2019-02-06 PROCEDURE — 82565 ASSAY OF CREATININE: CPT

## 2019-02-06 PROCEDURE — 74011636320 HC RX REV CODE- 636/320: Performed by: PHYSICIAN ASSISTANT

## 2019-02-06 RX ADMIN — IOPAMIDOL 100 ML: 612 INJECTION, SOLUTION INTRAVENOUS at 19:00

## 2019-02-13 ENCOUNTER — HOSPITAL ENCOUNTER (OUTPATIENT)
Dept: LAB | Age: 44
Discharge: HOME | End: 2019-02-13

## 2019-02-13 LAB — XX-LABCORP SPECIMEN COL,LCBCF: NORMAL

## 2019-02-13 PROCEDURE — 99001 SPECIMEN HANDLING PT-LAB: CPT

## 2019-02-17 LAB
25(OH)D3+25(OH)D2 SERPL-MCNC: 11.5 NG/ML (ref 30–100)
ALBUMIN SERPL-MCNC: 3.7 G/DL (ref 3.5–5.5)
BASOPHILS # BLD AUTO: 0 X10E3/UL (ref 0–0.2)
BASOPHILS NFR BLD AUTO: 0 %
BUN SERPL-MCNC: 7 MG/DL (ref 6–24)
BUN/CREAT SERPL: 9 (ref 9–23)
CALCIUM SERPL-MCNC: 10 MG/DL (ref 8.7–10.2)
CHLORIDE SERPL-SCNC: 97 MMOL/L (ref 96–106)
CO2 SERPL-SCNC: 22 MMOL/L (ref 20–29)
CREAT SERPL-MCNC: 0.78 MG/DL (ref 0.57–1)
EOSINOPHIL # BLD AUTO: 0.1 X10E3/UL (ref 0–0.4)
EOSINOPHIL NFR BLD AUTO: 1 %
ERYTHROCYTE [DISTWIDTH] IN BLOOD BY AUTOMATED COUNT: 16.2 % (ref 12.3–15.4)
EST. AVERAGE GLUCOSE BLD GHB EST-MCNC: 260 MG/DL
FOLATE SERPL-MCNC: 7.5 NG/ML
GLUCOSE SERPL-MCNC: 239 MG/DL (ref 65–99)
HBA1C MFR BLD: 10.7 % (ref 4.8–5.6)
HCT VFR BLD AUTO: 34 % (ref 34–46.6)
HGB BLD-MCNC: 10.4 G/DL (ref 11.1–15.9)
IMM GRANULOCYTES # BLD AUTO: 0 X10E3/UL (ref 0–0.1)
IMM GRANULOCYTES NFR BLD AUTO: 0 %
IRON SERPL-MCNC: 20 UG/DL (ref 27–159)
LYMPHOCYTES # BLD AUTO: 3.6 X10E3/UL (ref 0.7–3.1)
LYMPHOCYTES NFR BLD AUTO: 47 %
MCH RBC QN AUTO: 22.6 PG (ref 26.6–33)
MCHC RBC AUTO-ENTMCNC: 30.6 G/DL (ref 31.5–35.7)
MCV RBC AUTO: 74 FL (ref 79–97)
MONOCYTES # BLD AUTO: 0.5 X10E3/UL (ref 0.1–0.9)
MONOCYTES NFR BLD AUTO: 6 %
NEUTROPHILS # BLD AUTO: 3.6 X10E3/UL (ref 1.4–7)
NEUTROPHILS NFR BLD AUTO: 46 %
PLATELET # BLD AUTO: 501 X10E3/UL (ref 150–379)
POTASSIUM SERPL-SCNC: 4.3 MMOL/L (ref 3.5–5.2)
RBC # BLD AUTO: 4.61 X10E6/UL (ref 3.77–5.28)
SODIUM SERPL-SCNC: 136 MMOL/L (ref 134–144)
TSH SERPL DL<=0.005 MIU/L-ACNC: 4.94 UIU/ML (ref 0.45–4.5)
VIT B1 BLD-SCNC: 141.4 NMOL/L (ref 66.5–200)
VIT B12 SERPL-MCNC: 417 PG/ML (ref 232–1245)
WBC # BLD AUTO: 7.8 X10E3/UL (ref 3.4–10.8)

## 2019-02-28 ENCOUNTER — DOCUMENTATION ONLY (OUTPATIENT)
Dept: BARIATRICS/WEIGHT MGMT | Age: 44
End: 2019-02-28

## 2019-02-28 ENCOUNTER — HOSPITAL ENCOUNTER (OUTPATIENT)
Dept: LAB | Age: 44
Discharge: HOME | End: 2019-02-28
Payer: MEDICAID

## 2019-02-28 ENCOUNTER — HOSPITAL ENCOUNTER (OUTPATIENT)
Dept: BARIATRICS/WEIGHT MGMT | Age: 44
Discharge: HOME | End: 2019-02-28

## 2019-02-28 DIAGNOSIS — M79.7 FIBROMYALGIA: ICD-10-CM

## 2019-02-28 DIAGNOSIS — E66.01 MORBID OBESITY WITH BMI OF 40.0-44.9, ADULT (HCC): ICD-10-CM

## 2019-02-28 DIAGNOSIS — E11.69 DIABETES MELLITUS TYPE 2 IN OBESE (HCC): ICD-10-CM

## 2019-02-28 DIAGNOSIS — E66.9 DIABETES MELLITUS TYPE 2 IN OBESE (HCC): ICD-10-CM

## 2019-02-28 DIAGNOSIS — K91.2 POSTOPERATIVE MALABSORPTION: ICD-10-CM

## 2019-02-28 PROCEDURE — 93005 ELECTROCARDIOGRAM TRACING: CPT

## 2019-02-28 NOTE — PROGRESS NOTES
[address] [facility] [place] OLGA K[place] [address] [facility], [address]    Patient's Name: Bao Ortiz   Age: 37 y.o. YOB: 1975   Sex: female    Date:   2/28/2019    Insurance:            Session: 1 of 6  Revision:   Surgeon:  Dr. Haether Horn    Height: 5 f 7 Weight:    285      Lbs. BMI: 44.7   Pounds Lost since last month: 0               Pounds Gained since last month: 1    Starting Weight: 284   Previous Months Weight: 284  Overall Pounds Lost: 0 Overall Pounds Gained: 1      Do you smoke? None    Alcohol intake:  Number of drinks at a time:  None  Number of times a week: None    Class Guidelines    Guidelines are reviewed with patient at the start of every class. 1. Patient understands that weight loss trial classes must be consecutive. Patient understands if they miss a class, it is their responsibility to contact me to reschedule class. I will reach out to patient after their first no show. 2.  Patient understands the expectations that weight maintenance/weight loss is expected during the classes. Failure to demonstrate changes may result in one extra month of weight loss trial, followed by going back to see the surgeon. 3. Patient is also instructed to be doing their labs, blood work, psych visit, support group and any other test that the surgeon has used while they are working on their weight loss trial.   Patient understands that they CAN NOT gain any weight during the weight loss trial.  Gaining weight will result in extra classes    Other Pertinent Information:     Changes Made Since Last Class: No sweet tea. Maybe 1 cup of coffee a day. Eating Habits and Behaviors      Today we started off class talking about the Key Diet Principles. Patient was encouraged to start drinking 64 ounces of fluid per day. Patient was encouraged to start cutting out soda, caffeine, carbonation, sweet tea, fruit juice, and fruit smoothies.   Patient is currently drinking 64 ounces of fluid, which consists of 40 ounces of water, 8 ounce of soda. .  Patient was also instructed to fill up on meat, fish, vegetables, eggs, cheese, and some fruit. We also talked about protein drinks and patient was encouraged to start trying these, using them either for a meal replacement or a substitute for a current meal, which may be higher in carbohydrates. We talked about ways to lower carbohydrates and start trying substitutes, such as zucchini noodles and cauliflower rice. Patient's current diet habits include: 2-3 meals per day. She is eating a sausage and cheese croissant for breakfast.  We talked about some lower carbohydrate options. Lunch is lasagna, beef and rice or some other type of microwave meal.   Dinner is chicken or some type of sandwich. She is eating a snack in the afternoon or bedtime. She is snacking on chocolate, chips, or candy. We talked about some lower carbohydrate, protein-based snacks. Physical Activity/Exercise    Comments:     Currently for exercise, patient is walking at work, but nothing above normal routine. We talked about activities for patient to do, including walking, swimming, or chair exercises. I also talked with patient about doing some strength training, which helps the metabolism, as well. Goals have been set. Behavior Modification       Comments:   I also gave a power point on Behavior Changes and Weight Loss. Some of the suggestions in the power point included food journaling. Patient was also given some strategies to follow, such as cooking just enough for the meal and not putting serving bowls on the table. Patient was also encouraged to restrict where they are eating to 1-2 locations to avoid mindless eating throughout the day. Patient was given a check off list and encouraged to set 3 goals for the month. One goal that patient has set for next month is:  1. No caffeine.   2. Decrease sweets.       M[address]  2/28/2019

## 2019-03-01 ENCOUNTER — TELEPHONE (OUTPATIENT)
Dept: PAIN MANAGEMENT | Age: 44
End: 2019-03-01

## 2019-03-01 ENCOUNTER — OFFICE VISIT (OUTPATIENT)
Dept: PAIN MANAGEMENT | Age: 44
End: 2019-03-01

## 2019-03-01 VITALS
WEIGHT: 284 LBS | HEART RATE: 96 BPM | BODY MASS INDEX: 44.57 KG/M2 | HEIGHT: 67 IN | TEMPERATURE: 98.4 F | RESPIRATION RATE: 14 BRPM | DIASTOLIC BLOOD PRESSURE: 82 MMHG | SYSTOLIC BLOOD PRESSURE: 124 MMHG | OXYGEN SATURATION: 99 %

## 2019-03-01 DIAGNOSIS — G89.4 CHRONIC PAIN SYNDROME: Primary | ICD-10-CM

## 2019-03-01 DIAGNOSIS — M79.7 FIBROMYALGIA: ICD-10-CM

## 2019-03-01 DIAGNOSIS — M25.50 POLYARTHRALGIA: ICD-10-CM

## 2019-03-01 DIAGNOSIS — G89.29 CHRONIC BILATERAL LOW BACK PAIN, WITH SCIATICA PRESENCE UNSPECIFIED: ICD-10-CM

## 2019-03-01 DIAGNOSIS — Z79.899 ENCOUNTER FOR LONG-TERM (CURRENT) USE OF HIGH-RISK MEDICATION: ICD-10-CM

## 2019-03-01 DIAGNOSIS — M54.5 CHRONIC BILATERAL LOW BACK PAIN, WITH SCIATICA PRESENCE UNSPECIFIED: ICD-10-CM

## 2019-03-01 RX ORDER — HYDROCODONE BITARTRATE AND ACETAMINOPHEN 10; 325 MG/1; MG/1
1 TABLET ORAL
Qty: 45 TAB | Refills: 0 | Status: SHIPPED | OUTPATIENT
Start: 2019-03-10 | End: 2019-04-09

## 2019-03-01 RX ORDER — NALOXONE HYDROCHLORIDE 4 MG/.1ML
SPRAY NASAL
Qty: 1 EACH | Refills: 0 | Status: SHIPPED | OUTPATIENT
Start: 2019-03-01 | End: 2019-05-29 | Stop reason: ALTCHOICE

## 2019-03-01 NOTE — PROGRESS NOTES
Nursing Notes    Patient presents to the office today in follow-up. Patient rates her pain at 6/10 on the numerical pain scale. Reviewed medications with counts as follows:    Rx Date filled Qty Dispensed Pill Count Last Dose Short   Hydrocodone  mg 2/8/19 50 14 yesterday no                                       reviewed YES  Any aberrancies noted on  NO  Last opioid agreement 6/28/18  Last urine drug screen 11/8/18  3 most recent PHQ Screens 3/1/2019   Little interest or pleasure in doing things Not at all   Feeling down, depressed, irritable, or hopeless Not at all   Total Score PHQ 2 0   Trouble falling or staying asleep, or sleeping too much -   Feeling tired or having little energy -   Poor appetite, weight loss, or overeating -   Feeling bad about yourself - or that you are a failure or have let yourself or your family down -   Trouble concentrating on things such as school, work, reading, or watching TV -   Moving or speaking so slowly that other people could have noticed; or the opposite being so fidgety that others notice -   Thoughts of being better off dead, or hurting yourself in some way -   PHQ 9 Score -   How difficult have these problems made it for you to do your work, take care of your home and get along with others -       Comments:     POC UDS was not performed in office today    Any new labs or imaging since last appointment? YES, lab, EKG    Have you been to an emergency room (ER) or urgent care clinic since your last visit? NO            Have you been hospitalized since your last visit? NO     If yes, where, when, and reason for visit? Have you seen or consulted any other health care providers outside of the [address] [place] [address]  since your last visit? YES     If yes, where, when, and reason for visit? Ms. Elio Hutchins has a reminder for a \"due or due soon\" health maintenance.  I have asked that she contact her primary care provider for follow-up on this health maintenance.

## 2019-03-01 NOTE — TELEPHONE ENCOUNTER
12:45 pm The patient was notified that her Rx for Lyrica has been approved by her insurance. The patient was notified that our records with Isaac Jones last name do not match [facility], they still have her as Marjorie Caldwell. The patient stated that she is  and that she will changing her name that with the insurance Co. To Hui.

## 2019-03-01 NOTE — PATIENT INSTRUCTIONS

## 2019-03-02 LAB
ATRIAL RATE: 85 BPM
CALCULATED P AXIS, ECG09: 57 DEGREES
CALCULATED R AXIS, ECG10: 45 DEGREES
CALCULATED T AXIS, ECG11: 41 DEGREES
DIAGNOSIS, 93000: NORMAL
P-R INTERVAL, ECG05: 164 MS
Q-T INTERVAL, ECG07: 392 MS
QRS DURATION, ECG06: 82 MS
QTC CALCULATION (BEZET), ECG08: 466 MS
VENTRICULAR RATE, ECG03: 85 BPM
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
## 2019-03-06 NOTE — PROGRESS NOTES
1. A1c must be approaching 8 before surgery. 2. Needs 5000 D3 daily for hypovit D  3. PCP needs to address thyroid in clearance. If hypo - treat, if eu - document  4.  Needs to start OTC iron and PCP needs to address cause for iron deficiency anemia in clearance

## 2019-03-06 NOTE — PROGRESS NOTES
1. I have addressed A1C with patient. She states it has been coming down and she will continue to work on it. .  2. Patient and I talked about her Vitamin D level. Patient's level was 11.5. Normal is . Patient was instructed to take 5000 IU BID of Vitamin D3.  3. I mentioned her PCP needing to address thyroid in clearance, but discussed that will be reviewed more with her at her upcoming midtrial appointment with [facility]. 4. Patient's iron level was addressed. Normal: . Patient's level was 20.   Patient was instructed to start on iron and will be coming on Friday to  60 mg of BA iron and Vitamin D.    Mort Hodgkins MSRD

## 2019-03-11 ENCOUNTER — TELEPHONE (OUTPATIENT)
Dept: SURGERY | Age: 44
End: 2019-03-11

## 2019-03-11 NOTE — TELEPHONE ENCOUNTER
LVM for patient to call she needs her PCP needs to address thyroid in clearance.  If hypo - treat, if eu - document per Dr. Roberto Delarosa

## 2019-03-12 ENCOUNTER — TELEPHONE (OUTPATIENT)
Dept: SURGERY | Age: 44
End: 2019-03-12

## 2019-03-12 NOTE — TELEPHONE ENCOUNTER
Patient returned phone call she will try and get in with Dr. Dipti Cross to address her TSH she states she had know health insurance and now that she has it she will follow up with a doctor instead of with the [facility].

## 2019-03-28 ENCOUNTER — HOSPITAL ENCOUNTER (OUTPATIENT)
Dept: BARIATRICS/WEIGHT MGMT | Age: 44
Discharge: HOME | End: 2019-03-28

## 2019-03-28 ENCOUNTER — HOSPITAL ENCOUNTER (OUTPATIENT)
Dept: LAB | Age: 44
Discharge: HOME | End: 2019-03-28

## 2019-03-28 LAB — XX-LABCORP SPECIMEN COL,LCBCF: NORMAL

## 2019-03-28 PROCEDURE — 99001 SPECIMEN HANDLING PT-LAB: CPT

## 2019-03-29 ENCOUNTER — DOCUMENTATION ONLY (OUTPATIENT)
Dept: BARIATRICS/WEIGHT MGMT | Age: 44
End: 2019-03-29

## 2019-03-29 NOTE — PROGRESS NOTES
[address]    Patient's Name: Hanny Mccurdy   Age: 37 y.o. YOB: 1975   Sex: female    Date:   3/29/2019    Insurance:            Session: 2 of 6  Revision:   Surgeon:  Dr. Abrahan [place]    Height: 5 f 7 Weight:    276      Lbs. BMI: 43.3   Pounds Lost since last month: 9               Pounds Gained since last month: 0      Starting Weight: 284   Previous Months Weight: 285  Overall Pounds Lost: 8 Overall Pounds Gained: 0      Do you smoke? None    Alcohol intake:  Number of drinks at a time:  None  Number of times a week: None    Class Guidelines    Guidelines are reviewed with patient at the start of every class. 1. Patient understands that weight loss trial classes must be consecutive. Patient understands if they miss a class, it is their responsibility to contact me to reschedule class. I will reach out to patient after their first no show. 2.  Patient understands the expectations that weight maintenance/weight loss is expected during the classes. Failure to demonstrate changes may result in one extra month of weight loss trial, followed by going back to see the surgeon. Patient understands that they CAN NOT gain any weight during the weight loss trial.  Gaining weight will result in extra classes. 3. Patient is also instructed to be doing their labs, blood work, psych visit, support group and any other test that the surgeon has used while they are working on their weight loss trial.  4.  Patient was instructed to bring their blue binder to every class and appointment. Other Pertinent Information:     Changes Made Since Last Class: Prepare meals in advance. Eating Habits and Behaviors    Today in class, we reviewed the key diet principles. I have talked to patient about pushing the fluid and working towards 64 ounces per day.     Patient was given ideas of liquids that would be okay.  Patient was encouraged to cut out liquid calories, such as soda and sweet tea. We talked about the reasons that sugar sweetened beverages can promote weight gain. Sugar is highly palatable. Excessive consumption of sugar can trigger an exaggerated release of dopamine, which can promote a compulsive drive to consume more sugar sweetened beverages. Also, satiety is not reached with liquid calories the same way it does with solid calories. In class, we also talked about focusing on protein and low carbohydrates. Patient was encouraged during the weight loss trial to keep their carbohydrate less than 100 grams per day and their protein level at 60-80 grams per day. We talked about meal choices and snack ideas. Patient's current diet habits include: 3 meals per day. Patient is snacking on sweets, which I have talked with her and made other options. She is eating a saucer size plate. She is drinking power ashia zero. She is drinking some  Caffeine. Physical Activity/Exercise    Comments: We talked about exercise. Patient was given reasons of why exercise is so important and how that can help with their long-term success. I have encouraged patient to get a support system to help with the activity. Currently for activity, patient is doing very little, but states her goal is to do 30 minutes per day. Behavior Modification       Comments:  During today's lesson, I also spent some time talking about behavior changes. I talked to patient about the importance of taking vitamins post op and we reviewed the vitamins that patients will be taking post op. Patient will hear this again at pre op class before surgery. Patient had the opportunity to ask questions about these vitamins that will be lifelong. Goals that patient wants to work on includes:  1. Cut out sweets.   2. Eat before 7 pm.      Lanney Hammans, Luite Sly [address] RD  3/29/2019

## 2019-04-05 ENCOUNTER — TELEPHONE (OUTPATIENT)
Dept: SURGERY | Age: 44
End: 2019-04-05

## 2019-04-05 NOTE — TELEPHONE ENCOUNTER
[facility] call no answer left her message to call us back to answer her questions

## 2019-04-09 ENCOUNTER — TELEPHONE (OUTPATIENT)
Dept: PAIN MANAGEMENT | Age: 44
End: 2019-04-09

## 2019-04-09 DIAGNOSIS — G89.29 CHRONIC BILATERAL LOW BACK PAIN, WITH SCIATICA PRESENCE UNSPECIFIED: ICD-10-CM

## 2019-04-09 DIAGNOSIS — G89.4 CHRONIC PAIN SYNDROME: ICD-10-CM

## 2019-04-09 DIAGNOSIS — M54.5 CHRONIC BILATERAL LOW BACK PAIN, WITH SCIATICA PRESENCE UNSPECIFIED: ICD-10-CM

## 2019-04-09 NOTE — TELEPHONE ENCOUNTER
09:12 am I LVM for patient to call back regarding her refill. Phone number provided. When she called for refill she left an incomplete message.

## 2019-04-10 RX ORDER — HYDROCODONE BITARTRATE AND ACETAMINOPHEN 10; 325 MG/1; MG/1
1 TABLET ORAL
Qty: 40 TAB | Refills: 0 | Status: CANCELLED | OUTPATIENT
Start: 2019-04-12 | End: 2019-05-12

## 2019-04-10 NOTE — TELEPHONE ENCOUNTER
Marylou Gabriel has called requesting a refill of their controlled medication, Norco, for the management of chronic pain. Last office visit date: 3/1/19  Last opioid care agreement 6/28/18  Last UDS was done 11/18/18    Date last  was pulled and reviewed : 4/3/19  Last fill date for medication was 3/14/19    Was the patient compliant when the above report was pulled? yes    Analgesia: Patient reports 60-70% pain relief on current regimen. Aberrancies: No aberrancies noted in the last 30 days. ADL's: Patient states they are able to perform ADL's on current regimen. Adverse Reaction: Patient reports no adverse reactions at this time. Provider's last note and plan of care reviewed? YesRequest forwarded to provider for review.

## 2019-04-16 DIAGNOSIS — G89.4 CHRONIC PAIN SYNDROME: Primary | ICD-10-CM

## 2019-04-16 RX ORDER — HYDROCODONE BITARTRATE AND ACETAMINOPHEN 10; 325 MG/1; MG/1
1 TABLET ORAL
Qty: 40 TAB | Refills: 0 | Status: SHIPPED | OUTPATIENT
Start: 2019-04-16 | End: 2019-05-16

## 2019-04-16 NOTE — TELEPHONE ENCOUNTER
Mayelin Landaverde has called requesting a refill of their controlled medication, Norco, for the management of chronic pain. Last office visit date: 3/1/19  Last opioid care agreement 6/28/18  Last UDS was done 11/18/18    Date last  was pulled and reviewed : 4/3/19  Last fill date for medication was 3/14/19    Was the patient compliant when the above report was pulled? yes    Analgesia: Patient reports 60-70% pain relief on current regimen. Aberrancies: No aberrancies noted in the last 30 days. ADL's: Patient states they are able to perform ADL's on current regimen. Adverse Reaction: Patient reports no adverse reactions at this time. Provider's last note and plan of care reviewed? YesRequest forwarded to provider for review.

## 2019-04-18 ENCOUNTER — TELEPHONE (OUTPATIENT)
Dept: PAIN MANAGEMENT | Age: 44
End: 2019-04-18

## 2019-04-19 ENCOUNTER — TELEPHONE (OUTPATIENT)
Dept: PAIN MANAGEMENT | Age: 44
End: 2019-04-19

## 2019-04-19 NOTE — TELEPHONE ENCOUNTER
Hydrocodone/apap 10/325mg #40/30 by [facility] until 10/16/19. Faxed to pharmacy.

## 2019-04-25 ENCOUNTER — HOSPITAL ENCOUNTER (OUTPATIENT)
Dept: BARIATRICS/WEIGHT MGMT | Age: 44
Discharge: HOME | End: 2019-04-25

## 2019-04-26 ENCOUNTER — DOCUMENTATION ONLY (OUTPATIENT)
Dept: BARIATRICS/WEIGHT MGMT | Age: 44
End: 2019-04-26

## 2019-04-26 NOTE — PROGRESS NOTES
[address]    Patient's Name: Ha Jimenez   Age: 37 y.o. YOB: 1975   Sex: female    Date:   4/26/2019    Insurance:              Session: 3 of  6  Revision:     Surgeon:  Dr Loras Rinne    Height: 5 f 7   Weight:    276      Lbs. BMI: 43.0   Pounds Lost since last month: 0                 Pounds Gained since last month: 0    Starting Weight: 284     Previous Months Weight: 276  Overall Pounds Lost: 8   Overall Pounds Gained: 0    Do you smoke? None    Alcohol intake:  Number of drinks at a time:  None  Number of times a week: None    Class Guidelines    Guidelines are reviewed with patient at the start of every class. 1. Patient understands that weight loss trial classes must be consecutive. Patient understands if they miss a class, it is their responsibility to contact me to reschedule class. I will reach out to patient after their first no show. 2.  Patient understands the expectations that weight maintenance/weight loss is expected during the classes. Failure to demonstrate changes may result in one extra month of weight loss trial, followed by going back to see the surgeon. Patient understands that they CAN NOT gain any weight during the weight loss trial.  Gaining weight will result in extra classes. 3. Patient is also instructed to be doing their labs, blood work, psych visit, support group and any other test that the surgeon has used while they are working on their weight loss trial.  4.  Patient was instructed to bring their blue binder to every class and appointment. Other Pertinent Information:     Changes Made Since Last Class: Increasing water. Eating Habits and Behaviors      We started off class today by reviewing key diet principles.   Patient was given a very specific list of foods that they can eat, which included meat, fish, vegetables, eggs, cheese, fats, soy, and berries. Patient was also given a list of foods that should be avoided. These included sweets (candy, soda, baked goods, ice cream), and starches, including pasta, rice, crackers, chips, oatmeal, bread. We talked about appropriate protein-based snacks, including deli meat, low fat cheese, yogurt, hummus, small handful of almonds. I also gave a power point on ways to help with the metabolism. Some of the food-related ways included: Healthy snacking, eating adequate protein, and getting adequate water. Mild dehydration may slow down one's weight loss. Patient's current diet habits include: 2-3 meals per day. Patient is eating a boiled egg and turkey sausage. She is doing a protein drink for lunch. Dinner is meat and vegetables. She states she is no longer eating out. She is drinking 64 ounces of fluid per day. 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 We talked about activities for patient to do, including walking, swimming, or chair exercises. I also talked with patient about doing some strength training, which helps the metabolism, as well. Behavior Modification       Comments: In the power point, Mastering Your Metabolism, I also gave behaviors that can help with one's metabolism. These include not skipping meals and being sure to feed and fuel that body rather than going large gaps and putting the body in starvation mode. One goal for next month includes:  1. No sweets. 2. Exercise. 3. Have an appointment for thyroid.       Cecille Bustillo, MS RD  4/26/2019

## 2019-05-13 ENCOUNTER — OFFICE VISIT (OUTPATIENT)
Dept: PAIN MANAGEMENT | Age: 44
End: 2019-05-13

## 2019-05-13 VITALS
WEIGHT: 273 LBS | OXYGEN SATURATION: 95 % | RESPIRATION RATE: 21 BRPM | BODY MASS INDEX: 42.85 KG/M2 | TEMPERATURE: 97.1 F | DIASTOLIC BLOOD PRESSURE: 74 MMHG | HEART RATE: 90 BPM | SYSTOLIC BLOOD PRESSURE: 114 MMHG | HEIGHT: 67 IN

## 2019-05-13 DIAGNOSIS — M25.551 PAIN OF RIGHT HIP JOINT: ICD-10-CM

## 2019-05-13 DIAGNOSIS — M54.5 CHRONIC BILATERAL LOW BACK PAIN, WITH SCIATICA PRESENCE UNSPECIFIED: Primary | ICD-10-CM

## 2019-05-13 DIAGNOSIS — M70.62 TROCHANTERIC BURSITIS OF BOTH HIPS: ICD-10-CM

## 2019-05-13 DIAGNOSIS — Z79.899 ENCOUNTER FOR LONG-TERM (CURRENT) USE OF HIGH-RISK MEDICATION: ICD-10-CM

## 2019-05-13 DIAGNOSIS — M70.61 TROCHANTERIC BURSITIS OF BOTH HIPS: ICD-10-CM

## 2019-05-13 DIAGNOSIS — M46.1 SACROILIITIS (HCC): ICD-10-CM

## 2019-05-13 DIAGNOSIS — M79.2 NEUROPATHIC PAIN: ICD-10-CM

## 2019-05-13 DIAGNOSIS — G89.29 CHRONIC BILATERAL LOW BACK PAIN, WITH SCIATICA PRESENCE UNSPECIFIED: Primary | ICD-10-CM

## 2019-05-13 LAB
ALCOHOL UR POC: NORMAL
AMPHETAMINES UR POC: NEGATIVE
BARBITURATES UR POC: NORMAL
BENZODIAZEPINES UR POC: NEGATIVE
BUPRENORPHINE UR POC: NEGATIVE
CANNABINOIDS UR POC: NEGATIVE
CARISOPRODOL UR POC: NORMAL
COCAINE UR POC: NEGATIVE
FENTANYL UR POC: NORMAL
MDMA/ECSTASY UR POC: NORMAL
METHADONE UR POC: NEGATIVE
METHAMPHETAMINE UR POC: NORMAL
METHYLPHENIDATE UR POC: NORMAL
OPIATES UR POC: NORMAL
OXYCODONE UR POC: NEGATIVE
PHENCYCLIDINE UR POC: NORMAL
PROPOXYPHENE UR POC: NORMAL
TRAMADOL UR POC: NORMAL
TRICYCLICS UR POC: NORMAL

## 2019-05-13 RX ORDER — PANTOPRAZOLE SODIUM 40 MG/1
40 TABLET, DELAYED RELEASE ORAL DAILY
COMMUNITY
End: 2019-12-12

## 2019-05-13 RX ORDER — SUCRALFATE 1 G/1
1 TABLET ORAL 4 TIMES DAILY
COMMUNITY
End: 2019-08-22

## 2019-05-13 RX ORDER — DOCUSATE SODIUM 100 MG/1
100 CAPSULE, LIQUID FILLED ORAL
Qty: 60 CAP | Refills: 2 | Status: SHIPPED | OUTPATIENT
Start: 2019-05-13 | End: 2019-08-11

## 2019-05-13 RX ORDER — PREGABALIN 50 MG/1
50 CAPSULE ORAL 2 TIMES DAILY
Qty: 60 CAP | Refills: 3 | Status: SHIPPED | OUTPATIENT
Start: 2019-05-13 | End: 2019-06-12

## 2019-05-13 RX ORDER — SENNOSIDES 8.6 MG/1
1 TABLET ORAL
Qty: 30 TAB | Refills: 2 | Status: SHIPPED | OUTPATIENT
Start: 2019-05-13 | End: 2019-12-12

## 2019-05-13 RX ORDER — ONDANSETRON 4 MG/1
4 TABLET, FILM COATED ORAL
COMMUNITY
End: 2019-05-22 | Stop reason: ALTCHOICE
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
## 2019-05-13 NOTE — PROGRESS NOTES
Nursing Notes    Patient presents to the office today in follow-up. Patient rates her pain at 6/10 on the numerical pain scale. Reviewed medications with counts as follows:    Rx Date filled Qty Dispensed Pill Count Last Dose Short   norco 10/325 mg 04/17/19 40 28 This a.m no                                       reviewed YES  Any aberrancies noted on  NO  Last opioid agreement 06/28/18  Last urine drug screen 11/08/18- due today    Comments:     POC UDS was performed in office today    Any new labs or imaging since last appointment? NO    Have you been to an emergency room (ER) or urgent care clinic since your last visit? NO            Have you been hospitalized since your last visit? NO     If yes, where, when, and reason for visit? Have you seen or consulted any other health care providers outside of the [address] [place] [address]  since your last visit? YES     If yes, where, when, and reason for visit? pcp  Ms. Ana Castillo has a reminder for a \"due or due soon\" health maintenance. I have asked that she contact her primary care provider for follow-up on this health maintenance.     3 most recent PHQ Screens 5/13/2019   Little interest or pleasure in doing things Not at all   Feeling down, depressed, irritable, or hopeless Not at all   Total Score PHQ 2 0   Trouble falling or staying asleep, or sleeping too much -   Feeling tired or having little energy -   Poor appetite, weight loss, or overeating -   Feeling bad about yourself - or that you are a failure or have let yourself or your family down -   Trouble concentrating on things such as school, work, reading, or watching TV -   Moving or speaking so slowly that other people could have noticed; or the opposite being so fidgety that others notice -   Thoughts of being better off dead, or hurting yourself in some way -   PHQ 9 Score -   How difficult have these problems made it for you to do your work, take care of your home and get along with others -

## 2019-05-13 NOTE — PATIENT INSTRUCTIONS
Piriformis Syndrome: Exercises  Your Care Instructions  Here are some examples of typical rehabilitation exercises for your condition. Start each exercise slowly. Ease off the exercise if you start to have pain. Your doctor or physical therapist will tell you when you can start these exercises and which ones will work best for you. How to do the exercises  Hip rotator stretch    1. Lie on your back with both knees bent and your feet flat on the floor. 2. Put the ankle of your affected leg on your opposite thigh near your knee. 3. Use your hand to gently push your knee (on your affected leg) away from your body until you feel a gentle stretch around your hip. 4. Hold the stretch for 15 to 30 seconds. 5. Repeat 2 to 4 times. 6. Switch legs and repeat steps 1 through 5. Piriformis stretch    1. Lie on your back with your legs straight. 2. Lift your affected leg and bend your knee. With your opposite hand, reach across your body, and then gently pull your knee toward your opposite shoulder. 3. Hold the stretch for 15 to 30 seconds. 4. Repeat with your other leg. 5. Repeat 2 to 4 times on each side. Lower abdominal strengthening    1. Lie on your back with your knees bent and your feet flat on the floor. 2. Tighten your belly muscles by pulling your belly button in toward your spine. 3. Lift one foot off the floor and bring your knee toward your chest, so that your knee is straight above your hip and your leg is bent like the letter \"L. \"  4. Lift the other knee up to the same position. 5. Lower one leg at a time to the starting position. 6. Keep alternating legs until you have lifted each leg 8 to 12 times. 7. Be sure to keep your belly muscles tight and your back still as you are moving your legs. Be sure to breathe normally. Follow-up care is a key part of your treatment and safety. Be sure to make and go to all appointments, and call your doctor if you are having problems.  It's also a good idea to know your test results and keep a list of the medicines you take. Current as of: September 20, 2018  Content Version: 11.9  © 4820-1748 [employer]. Care instructions adapted under license by [employer] (which disclaims liability or warranty for this information). If you have questions about a medical condition or this instruction, always ask your healthcare professional. [place] [address] any warranty or liability for your use of this information.

## 2019-05-16 ENCOUNTER — HOSPITAL ENCOUNTER (OUTPATIENT)
Dept: GENERAL RADIOLOGY | Age: 44
Discharge: HOME | End: 2019-05-16
Payer: MEDICAID

## 2019-05-16 DIAGNOSIS — M25.551 PAIN OF RIGHT HIP JOINT: ICD-10-CM

## 2019-05-16 PROCEDURE — 73502 X-RAY EXAM HIP UNI 2-3 VIEWS: CPT

## 2019-05-22 ENCOUNTER — OFFICE VISIT (OUTPATIENT)
Dept: SURGERY | Age: 44
End: 2019-05-22

## 2019-05-22 VITALS
WEIGHT: 278 LBS | SYSTOLIC BLOOD PRESSURE: 126 MMHG | BODY MASS INDEX: 43.63 KG/M2 | DIASTOLIC BLOOD PRESSURE: 90 MMHG | RESPIRATION RATE: 18 BRPM | OXYGEN SATURATION: 99 % | TEMPERATURE: 97.3 F | HEIGHT: 67 IN | HEART RATE: 86 BPM

## 2019-05-22 DIAGNOSIS — E11.69 DIABETES MELLITUS TYPE 2 IN OBESE (HCC): ICD-10-CM

## 2019-05-22 DIAGNOSIS — K42.9 PERIUMBILICAL HERNIA: ICD-10-CM

## 2019-05-22 DIAGNOSIS — K59.00 CONSTIPATION, UNSPECIFIED CONSTIPATION TYPE: ICD-10-CM

## 2019-05-22 DIAGNOSIS — E66.9 DIABETES MELLITUS TYPE 2 IN OBESE (HCC): ICD-10-CM

## 2019-05-22 DIAGNOSIS — E66.01 MORBID (SEVERE) OBESITY DUE TO EXCESS CALORIES (HCC): Primary | ICD-10-CM

## 2019-05-22 DIAGNOSIS — E66.01 OBESITY, MORBID (HCC): ICD-10-CM

## 2019-05-22 NOTE — PROGRESS NOTES
Chief Complaint   Patient presents with    Follow-up     bariatric program   1. Have you been to the ER, urgent care clinic since your last visit? Hospitalized since your last visit? yes for abd pain    2. Have you seen or consulted any other health care providers outside of the [address] [place] [address] since your last visit? Include any pap smears or colon screening.  Gi liver specialist and pain management

## 2019-05-22 NOTE — Clinical Note
Mid trialAs PCP clearance form we need [id number] too high needs to approach 8Patient wants [facility] to repair hernia during bariatric surgery will send him note

## 2019-05-22 NOTE — PROGRESS NOTES
Bariatric Preoperative Progress note:    Subjective:     Mike العراقي is a 37 y.o. female who presents today for followup of their candidacy for bariatric surgery. It is due to the patient's severe obesity, which is further complicated by adult onset diabetes mellitus and hypertension  that the patient is now seeking out bariatric surgery. Since last seen, Mike العراقي has been working through bariatric program towards [facility]. Concerned about a umbilical hernia that causes, 10/10 pain intermitting, CT 19 noting a fat containing periumbilical hernia without obstruction. Would like Treviño to repair while in surgery. Started Carafate and Protonix treatment plan, by PCP, she reports improvement in abdominal pain. Trial of Linzess recommended by GI, pt did not pursue. Past Medical History:   Diagnosis Date    Diabetes ([address]     Essential hypertension     Neurological disorder     migraines       Past Surgical History:   Procedure Laterality Date    HX  SECTION      c section    HX CHOLECYSTECTOMY      HX ORTHOPAEDIC      pelvic repair       Current Outpatient Medications   Medication Sig Dispense Refill    insulin glargine,hum.rec.anlog (LANTUS SC) 50 Units by SubCUTAneous route daily.  sucralfate (CARAFATE) 1 gram tablet Take 1 g by mouth four (4) times daily.  pantoprazole (PROTONIX) 40 mg tablet Take 40 mg by mouth daily.  docusate sodium (COLACE) 100 mg capsule Take 1 Cap by mouth two (2) times daily as needed for Constipation for up to 90 days. 60 Cap 2    pregabalin (LYRICA) 50 mg capsule Take 1 Cap by mouth two (2) times a day for 30 days. Max Daily Amount: 100 mg. 60 Cap 3    senna (SENNA) 8.6 mg tablet Take 1 Tab by mouth daily as needed for Constipation. 30 Tab 2    capsaicin (CAPZASIN-HP) 0.1 % topical cream Apply cream to lower back and lower legs TID. Avoid use on damaged, broken or irritated skin.  Avoid occlusive dressings or heat while using this medication. 42.5 g 2    glimepiride (AMARYL) 4 mg tablet Take 4 mg by mouth every morning.  topiramate (TOPAMAX) 100 mg tablet Take  by mouth two (2) times a day.  dicyclomine (BENTYL) 20 mg tablet Take 20 mg by mouth every eight (8) hours.  acetaminophen (TYLENOL EXTRA STRENGTH) 500 mg tablet Take 500 mg by mouth every six (6) hours as needed for Pain.  hydroCHLOROthiazide (HYDRODIURIL) 25 mg tablet Take 25 mg by mouth daily.  naloxone (NARCAN) 4 mg/actuation nasal spray Use 1 spray intranasally into 1 nostril as needed for opioid respiratory emergency only.  1 Each 0       Allergies   Allergen Reactions    Aspirin Other (comments)     Chest pain    Darvocet A500 [Propoxyphene N-Acetaminophen] Hives    Pcn [Penicillins] Hives    Sulfur Hives       Review of Systems:  Positive in BOLD  CONST: Fever, weight loss, fatigue or chills  GI: Nausea, vomiting, abdominal pain, constipation, change in bowel habits, hematochezia, melena, and GERD   INTEG: Dermatitis, abnormal moles  HEENT: Recent changes in vision, vertigo, epistaxis, dysphagia and hoarseness  CV: Chest pain, palpitations, HTN, edema and varicosities  RESP: Cough, shortness of breath, wheezing, hemoptysis, snoring and reactive airway disease  : Hematuria, dysuria, frequency, urgency, nocturia and stress urinary incontinence   MS: Weakness, joint pain and arthritis - hips and back  ENDO: Diabetes, thyroid disease, polyuria, polydipsia, polyphagia, poor wound healing, heat intolerance, cold intolerance  LYMPH/HEME: Anemia, bruising and history of blood transfusions  NEURO: Dizziness, headache, fainting, seizures and stroke  PSYCH: Anxiety and depression      Objective:     Physical Exam:  Visit Vitals  /90   Pulse 86   Temp 97.3 °F (36.3 °C)   Resp 18   Ht 5' 7\" (1.702 m)   Wt 126.1 kg (278 lb)   SpO2 99%   BMI 43.54 kg/m²       Physical Exam   Constitutional: She is oriented to person, place, and time and well-developed, well-nourished, and in no distress. HENT:   Head: Normocephalic. Cardiovascular: Normal rate and regular rhythm. Pulmonary/Chest: Effort normal and breath sounds normal.   Abdominal: Soft. Bowel sounds are normal. She exhibits no distension and no mass. There is no tenderness. Musculoskeletal: Normal range of motion. Neurological: She is alert and oriented to person, place, and time. Skin: Skin is warm and dry. Psychiatric: Affect normal.   Nursing note and vitals reviewed. Studies to date:    Labs:    Ref. Range 2/13/2019 00:00   WBC Latest Ref Range: 3.4 - 10.8 x10E3/uL 7.8   RBC Latest Ref Range: 3.77 - 5.28 x10E6/uL 4.61   HGB Latest Ref Range: 11.1 - 15.9 g/dL 10.4 (L)   HCT Latest Ref Range: 34.0 - 46.6 % 34.0   MCV Latest Ref Range: 79 - 97 fL 74 (L)   MCH Latest Ref Range: 26.6 - 33.0 pg 22.6 (L)   MCHC Latest Ref Range: 31.5 - 35.7 g/dL 30.6 (L)   RDW Latest Ref Range: 12.3 - 15.4 % 16.2 (H)   PLATELET Latest Ref Range: 150 - 379 x10E3/uL 501 (H)   NEUTROPHILS Latest Ref Range: Not Estab. % 46   Lymphocytes Latest Ref Range: Not Estab. % 47   MONOCYTES Latest Ref Range: Not Estab. % 6   EOSINOPHILS Latest Ref Range: Not Estab. % 1   BASOPHILS Latest Ref Range: Not Estab. % 0   IMMATURE GRANULOCYTES Latest Ref Range: Not Estab. % 0   ABS. NEUTROPHILS Latest Ref Range: 1.4 - 7.0 x10E3/uL 3.6   ABS. IMM. GRANS. Latest Ref Range: 0.0 - 0.1 x10E3/uL 0.0   Abs Lymphocytes Latest Ref Range: 0.7 - 3.1 x10E3/uL 3.6 (H)   ABS. MONOCYTES Latest Ref Range: 0.1 - 0.9 x10E3/uL 0.5   ABS. EOSINOPHILS Latest Ref Range: 0.0 - 0.4 x10E3/uL 0.1   ABS.  BASOPHILS Latest Ref Range: 0.0 - 0.2 x10E3/uL 0.0   Sodium Latest Ref Range: 134 - 144 mmol/L 136   Potassium Latest Ref Range: 3.5 - 5.2 mmol/L 4.3   Chloride Latest Ref Range: 96 - 106 mmol/L 97   CO2 Latest Ref Range: 20 - 29 mmol/L 22   Glucose Latest Ref Range: 65 - 99 mg/dL 239 (H)   BUN Latest Ref Range: 6 - 24 mg/dL 7   Creatinine Latest Ref Range: 0.57 - 1.00 mg/dL 0.78   BUN/Creatinine ratio Latest Ref Range: 9 - 23  9   Calcium Latest Ref Range: 8.7 - 10.2 mg/dL 10.0   GFR est non-AA Latest Ref Range: >59 mL/min/1.73 92   GFR est AA Latest Ref Range: >59 mL/min/1.73 106   Albumin Latest Ref Range: 3.5 - 5.5 g/dL 3.7   Vitamin B12 Latest Ref Range: 232 - 1,245 pg/mL 417   Folate Latest Ref Range: >3.0 ng/mL 7.5   Hemoglobin A1c, (calculated) Latest Ref Range: 4.8 - 5.6 % 10.7 (H)   Estimated average glucose Latest Units: mg/dL 260   Iron Latest Ref Range: 27 - 159 ug/dL 20 (L)   TSH Latest Ref Range: 0.450 - 4.500 uIU/mL 4.940 (H)   VITAMIN B1, WHOLE BLOOD Unknown wnl   VITAMIN D, 25-HYDROXY Latest Ref Range: 30.0 - 100.0 ng/mL 11.5 (L)       EKG:   Diagnosis   Final   Normal sinus rhythm   Normal ECG   No previous ECGs available   Confirmed by Monica West ([id number]) on 3/2/2019 9:33:13 PM     Nutritional evaluation: 3/6, due to finish in July     Psychiatric evaluation: approved     PCP: (address [facility] and MARY BETH in clearance) received clearance form but need office notes     Assessment:   Miriam Harrell is a 37 y.o. female who is progressing through the bariatric preoperative evaluation. Elevated A1c -A1c must be approaching 8 before surgery. Low vitamin D-needs 5000 D3 twice daily for hypovit D  Elevated TSH-PCP needs to address thyroid in clearance.  If hypo - treat, if  eu-documentation needed  Iron deficiency-needs to start OTC iron and PCP needs to address cause for iron  deficiency anemia in clearance  Constipation-patient continues to decline Linzess, continue with management plan from GI OTC MiraLAX as directed titrate as needed to achieve 1-2 soft stools per day    Periumbilical hernia without bowel herniation or obstruction found by GI on CT, GI refer to general surgery--patient inquiring if repair can be made at same time as bariatric surgery will consult with Dr. Perry Torres:     -complete remainder of preop evaluation including primary care clearance note to accompany clearance form, decrease in A1c, remaining weight loss trial, and attend support group.  -pt communicates understanding that the expectation is to lose or maintain weight during WLT. Weight gain may result in delaying surgery.   -Follow up once has completed entirety of weight loss workup to determine next steps.          >50% of 30 min visit spent counseling     Ry Figueroa, PEETP-BC

## 2019-05-22 NOTE — Clinical Note
Periumbilical hernia without bowel herniation or obstruction found by GI on CT, GI refer to general surgery--patient inquiring if repair can be made at same time as bariatric surgery

## 2019-05-28 DIAGNOSIS — M70.61 TROCHANTERIC BURSITIS OF BOTH HIPS: ICD-10-CM

## 2019-05-28 DIAGNOSIS — G89.29 CHRONIC BILATERAL LOW BACK PAIN, WITH SCIATICA PRESENCE UNSPECIFIED: ICD-10-CM

## 2019-05-28 DIAGNOSIS — M46.1 SACROILIITIS (HCC): ICD-10-CM

## 2019-05-28 DIAGNOSIS — M54.5 CHRONIC BILATERAL LOW BACK PAIN, WITH SCIATICA PRESENCE UNSPECIFIED: ICD-10-CM

## 2019-05-28 DIAGNOSIS — M70.62 TROCHANTERIC BURSITIS OF BOTH HIPS: ICD-10-CM

## 2019-05-28 DIAGNOSIS — G89.4 CHRONIC PAIN SYNDROME: Primary | ICD-10-CM

## 2019-05-28 NOTE — TELEPHONE ENCOUNTER
Cinthia Gitelman has called requesting a refill of their controlled medication, Norco 5/325 mg tab, for the management of chronic pain. Last office visit date: 5/13/19 with Fani Mahan, and has a f/u appt on 8/13/19 with Phil. Last opioid care agreement 6/28/18  Last UDS was done 5/13/19    Date last  was pulled and reviewed : 5/28/19  Last fill date for medication was 4/17/19    Was the patient compliant when the above report was pulled? yes    Analgesia: Patient reports 60% pain relief on current regimen. Aberrancies: No aberrancies noted in the last 30 days. ADL's: Patient states they are able to perform ADL's on current regimen. Adverse Reaction: Patient reports no adverse reactions at this time. Provider's last note and plan of care reviewed? yes  Request forwarded to provider for review.

## 2019-05-29 PROBLEM — K76.0 HEPATIC STEATOSIS: Status: ACTIVE | Noted: 2019-05-29

## 2019-05-29 PROBLEM — K42.9 PERIUMBILICAL HERNIA: Status: ACTIVE | Noted: 2019-05-29

## 2019-05-29 PROBLEM — E66.01 OBESITY, MORBID (HCC): Status: ACTIVE | Noted: 2019-05-29

## 2019-05-29 RX ORDER — ONDANSETRON 4 MG/1
4 TABLET, ORALLY DISINTEGRATING ORAL
COMMUNITY
Start: 2018-09-30 | End: 2019-09-12

## 2019-05-29 RX ORDER — HYDROCODONE BITARTRATE AND ACETAMINOPHEN 5; 325 MG/1; MG/1
1 TABLET ORAL
Qty: 60 TAB | Refills: 0 | Status: SHIPPED | OUTPATIENT
Start: 2019-05-29 | End: 2019-05-29 | Stop reason: ALTCHOICE

## 2019-05-30 NOTE — TELEPHONE ENCOUNTER
Patient identified using two patient identifiers (name and ); patient advised prescriptions are ready for pick-up. Patient also informed they would need to be the one to pick their Rx up to update [facility], and that  hours are Mon-Fri [id number]. Patient verbalized understanding.

## 2019-05-31 ENCOUNTER — HOSPITAL ENCOUNTER (OUTPATIENT)
Dept: BARIATRICS/WEIGHT MGMT | Age: 44
Discharge: HOME | End: 2019-05-31

## 2019-05-31 ENCOUNTER — DOCUMENTATION ONLY (OUTPATIENT)
Dept: BARIATRICS/WEIGHT MGMT | Age: 44
End: 2019-05-31

## 2019-05-31 NOTE — PROGRESS NOTES
[address] Loss Tena Pereira [address] [facility], [address]    Patient's Name: Cinthia Gitelman   Age: 37 y.o. YOB: 1975   Sex: female    Date:   5/23/19    Insurance:              Session: 4 of 6  Revision:     Surgeon:  Dr. Rosalina Tineo    Height: 5 f 7   Weight:    278      Lbs. BMI: 43.6   Pounds Lost since last month: 0                 Pounds Gained since last month: 2    Starting Weight: 284     Previous Months Weight: 276  Overall Pounds Lost: 6   Overall Pounds Gained: 0    Do you smoke? None    Alcohol intake:  Number of drinks at a time:  None  Number of times a week: None    Class Guidelines    Guidelines are reviewed with patient at the start of every class. 1. Patient understands that weight loss trial classes must be consecutive. Patient understands if they miss a class, it is their responsibility to contact me to reschedule class. I will reach out to patient after their first no show. 2.  Patient understands the expectations that weight maintenance/weight loss is expected during the classes. Failure to demonstrate changes may result in one extra month of weight loss trial, followed by going back to see the surgeon. Patient understands that they CAN NOT gain any weight during the weight loss trial.  Gaining weight will result in extra classes. 3. Patient is also instructed to be doing their labs, blood work, psych visit, support group and any other test that the surgeon has used while they are working on their weight loss trial.  4.  Patient was instructed to bring their blue binder to every class and appointment. Other Pertinent Information:     Changes Made Since Last Class: Stop drinking sweet tea. Eating Habits and Behaviors      Today in class we talked about the key diet principles.   We start off each class talking about these principles, which include cutting out liquid calories and focusing on water or other non-calorie, non-carbonated drinks. We also spent time talking about carbohydrates, including foods that have carbohydrates and the goal to keep daily carbohydrates under 100 grams per day. Patient was given ideas of meal and snack choices that are lower in carbohydrates and focus more on protein. Patient was encouraged to start trying protein shakes and was given a list of suggestions. The main topic of class today was: Portion Control. We reviewed in class a power point filled with tips on ways to control portions, including using smaller plates, boxing up portions at a restaurant before starting to eat, and not eating from the container, but rather portioning snacks into smaller bags. Patient's were encouraged to food journal, which helps increase awareness of what and how much they are eating. It was emphasized to patient the importance of reading labels and portion sizes, but also applying these portion sizes. Patient was given a list of items that can help to make portion control easier. For example, a deck of cards or a palm of a hand is a proper portion of meat, a fist is a cup or a proper serving of vegetables. Patient was given 10 tips to help with the portion control. Patient's current diet habits include: 2 meals per day. She states she is normally doing a protein shake for breakfast.  Lunch is baked fish, shrimp, and some vegetables. Dinner is sometimes fish, shrimp, yogurt. She is snacking on peanuts, yogurt, and states she does eat a lot of chocolate. She is drinking water and some Crystal light and is encouraged to increase how much she is drinking. Physical Activity/Exercise    Comments:     Currently for exercise, patient is walking all day at work. Patient was given a list of ideas for activity and was encouraged to incorporate 30 minutes a day into their daily routine. Behavior Modification       Comments:    In class, we also focused on the behavior aspects of weight management. This includes being a mindful eater and not eating in front of the TV. Patient is also encouraged to take 20 minutes to eat a meal and eat at a table. One goal for next month includes:  1. Start walking more. 2. Cut out all sweets.   3. Lose 5 pounds by next weigh in.      Mega Lakhani [address]  5/23/19

## 2019-06-12 ENCOUNTER — APPOINTMENT (OUTPATIENT)
Dept: GENERAL RADIOLOGY | Age: 44
End: 2019-06-12
Attending: PHYSICAL MEDICINE & REHABILITATION
Payer: MEDICAID

## 2019-06-12 ENCOUNTER — HOSPITAL ENCOUNTER (OUTPATIENT)
Age: 44
Setting detail: OUTPATIENT SURGERY
Discharge: HOME | End: 2019-06-12
Attending: PHYSICAL MEDICINE & REHABILITATION | Admitting: PHYSICAL MEDICINE & REHABILITATION
Payer: MEDICAID

## 2019-06-12 VITALS
BODY MASS INDEX: 43.63 KG/M2 | OXYGEN SATURATION: 100 % | RESPIRATION RATE: 18 BRPM | DIASTOLIC BLOOD PRESSURE: 88 MMHG | HEIGHT: 67 IN | SYSTOLIC BLOOD PRESSURE: 149 MMHG | WEIGHT: 278 LBS | TEMPERATURE: 98.5 F | HEART RATE: 94 BPM

## 2019-06-12 LAB — GLUCOSE BLD STRIP.AUTO-MCNC: 122 MG/DL (ref 70–110)

## 2019-06-12 PROCEDURE — 74011250636 HC RX REV CODE- 250/636: Performed by: PHYSICAL MEDICINE & REHABILITATION

## 2019-06-12 PROCEDURE — 74011636320 HC RX REV CODE- 636/320: Performed by: PHYSICAL MEDICINE & REHABILITATION

## 2019-06-12 PROCEDURE — 77030003666 HC NDL SPINAL BD -A: Performed by: PHYSICAL MEDICINE & REHABILITATION

## 2019-06-12 PROCEDURE — 77030003672 HC NDL SPN HALY -A: Performed by: PHYSICAL MEDICINE & REHABILITATION

## 2019-06-12 PROCEDURE — 82962 GLUCOSE BLOOD TEST: CPT

## 2019-06-12 PROCEDURE — 76010000009 HC PAIN MGT 0 TO 30 MIN PROC: Performed by: PHYSICAL MEDICINE & REHABILITATION

## 2019-06-12 RX ORDER — LIDOCAINE HYDROCHLORIDE 10 MG/ML
INJECTION, SOLUTION EPIDURAL; INFILTRATION; INTRACAUDAL; PERINEURAL AS NEEDED
Status: DISCONTINUED | OUTPATIENT
Start: 2019-06-12 | End: 2019-06-12 | Stop reason: HOSPADM

## 2019-06-12 RX ORDER — TRIAMCINOLONE ACETONIDE 40 MG/ML
INJECTION, SUSPENSION INTRA-ARTICULAR; INTRAMUSCULAR AS NEEDED
Status: DISCONTINUED | OUTPATIENT
Start: 2019-06-12 | End: 2019-06-12 | Stop reason: HOSPADM

## 2019-06-12 NOTE — DISCHARGE INSTRUCTIONS
[address] [place] [address] for Pain Management  Dr. Ann-Marie Turk Procedures Instructions    *Resume Diet and Activity as tolerated. Rest for the remainder of the day. *You may fell worse before you feel better as the numbing medications wear off before the steroids take effect if used for your procedures. *Do not use affected extremity until numbness or loss of sensation has completely resolved without assistance. *DO NOT DRIVE, operate machinery/heavey equipment for 24 hours. *DO NOT DRINK ALCOHOL for 24 hours as it may interact with the sedation if you received it and also thins your blood and may cause you to bleed. *WAIT 24 hours before starting back ANY Blood thinning medications:   (Heparin, Coumadin, Warfarin, Lovenox, Plavix, Aggrenox)    *Resume Pre-Procedure Medications as prescribed except Blood Thinners unless directed by your Physician or Cardiologist.     *Avoid Hot tubs and Heating pad for 24 hours to prevent dissipation of medications, you may shower to remove bandages and remaining prep residue on the skin. * If you develop a Headache, drink plenty of fluids including beverages with caffeine (Coffee, Mt. Dew etc.) and rest.  If the headache persists longer than 24 hoursor intensifies - Please call [facility]) [phone]    * If you are DIABETIC, check your blood sugar three times a day for the next three days, the steroids will increase your blood sugar. If your blood sugar is greater than 400 have someone drive you to the nearest [email or web address]. * If you experience any of the following problems, call the [facility] [phone] between 8:00 am - 4:30pm or After Hours [phone].     Shortness of breath    Fever of 101 F or higher    Nausea / Vomiting (not normal to you)    Increasing stiffness in the neck    Weakness or numbness in the arms or legs that is not resolving    Prolonged and increasing pain > than 4 days    ANYTHING OUT of the ORDINARY TO YOU    If YOU are experiencing a severe reaction / complication that you have never had before post procedure, call 911 or go to the nearest emergency room! All patients must have a  for transportation [place] regardless if you do or do not receive sedation. DISCHARGE SUMMARY from Nurse      PATIENT INSTRUCTIONS:    After Oral  or intravenous sedation, for 24 hours or while taking prescription Narcotics:  · Limit your activities  · Do not drive and operate hazardous machinery  · Do not make important personal or business decisions  · Do  not drink alcoholic beverages  · If you have not urinated within 8 hours after discharge, please contact your surgeon on call. Report the following to your surgeon:  · Excessive pain, swelling, redness or odor of or around the surgical area  · Temperature over 101  · Nausea and vomiting lasting longer than 4 hours or if unable to take medications  · Any signs of decreased circulation or nerve impairment to extremity: change in color, persistent  numbness, tingling, coldness or increase pain  · Any questions        What to do at Home:  Recommended activity: Activity as tolerated, NO DRIVING FOR 24 Hours post injection          *  Please give a list of your current medications to your Primary Care Provider. *  Please update this list whenever your medications are discontinued, doses are      changed, or new medications (including over-the-counter products) are added. *  Please carry medication information at all times in case of emergency situations. These are general instructions for a healthy lifestyle:    No smoking/ No tobacco products/ Avoid exposure to second hand smoke    Surgeon General's Warning:  Quitting smoking now greatly reduces serious risk to your health.     Obesity, smoking, and sedentary lifestyle greatly increases your risk for illness    A healthy diet, regular physical exercise & weight monitoring are important for maintaining a healthy lifestyle    You may be retaining fluid if you have a history of heart failure or if you experience any of the following symptoms:  Weight gain of 3 pounds or more overnight or 5 pounds in a week, increased swelling in our hands or feet or shortness of breath while lying flat in bed. Please call your doctor as soon as you notice any of these symptoms; do not wait until your next office visit. Recognize signs and symptoms of STROKE:    F-face looks uneven    A-arms unable to move or move unevenly    S-speech slurred or non-existent    T-time-call 911 as soon as signs and symptoms begin-DO NOT go       Back to bed or wait to see if you get better-TIME IS BRAIN.

## 2019-06-12 NOTE — PROCEDURES
[facility] [address] for Pain Management  Brief Pre-Procedure History & Physical    PATIENT NAME:  Julia Hutton   YOB: 1975  DATE OF SERVICE:  2019      CHIEF COMPLAINT:  Pain    HISTORY OF PRESENT ILLNESS:  Julia Hutton presents today for a previously diagnosed problem contributing to some or all of this patients pain. The location and pattern of the pain has not changed substantially since the last visit in our office. No other significant medical changes have occurred in the last 30 days. PAST MEDICAL HISTORY:  The patient  has a past medical history of Diabetes ([address] Essential hypertension, and Neurological disorder. PAST SURGICAL HISTORY:  The patient  has a past surgical history that includes hx cholecystectomy; hx  section (); and hx orthopaedic (). CURRENT MEDICATIONS:  See Medication Administration Record [facility]) in the patient's electronic record. ALLERGIES:    Allergies   Allergen Reactions    Aspirin Other (comments)     Chest pain    Darvocet A500 [Propoxyphene N-Acetaminophen] Hives    Pcn [Penicillins] Hives    Sulfur Hives    Sulfa (Sulfonamide Antibiotics) Hives       FAMILY HISTORY:  The patient family history includes Diabetes in her father and mother; Hypertension in her father and mother. SOCIAL HISTORY:  The patient  reports that she quit smoking about 3 years ago. She has never used smokeless tobacco. The patient  reports that she does not drink alcohol. She  reports that she does not use drugs. REVIEW OF SYSTEMS:  Julia Hutton denies any fever, chills, unexplained weight loss, use of antibiotics for recent infection or bleeding abnormalities. PHYSICAL EXAM:  VS:   Visit Vitals  /85 (BP 1 Location: Left arm, BP Patient Position: Sitting)   Pulse 85   Temp 98.5 °F (36.9 °C)   Resp 20   Ht 5' 7\" (1.702 m)   Wt 126.1 kg (278 lb)   SpO2 100%   BMI 43.54 kg/m²     Gen: Well-developed.  Body habitus consistent with recorded height and weight and the calculated BMI. No apparent distress. Head: Normocephalic, atraumatic. Skin: No obvious rashes, lesions or infection. Pulm: Respirations are even and unlabored. Psych:    Mood, affect and speech - Appropriate. Tender to palpation over the right sacroiliac joint. Positive right-sided Miriam's. ASSESSMENT:   1. Stable for right sacroiliac joint injection as discussed. PLAN:  Proceed with scheduled procedure. Elizabeth Dangelo DO 6/12/2019 Time: 2 [facility] PAIN MANAGEMENT    SACROILIAC JOINT INJECTION PROCEDURE REPORT      PATIENT:  Shun Pugh  YOB: 1975  DATE OF SERVICE:  6/12/2019  [facility]    PRE-PROCEDURE DIAGNOSIS:  Sacroiliitis    POST-PROCEDURE DIAGNOSIS:  Same as Above                PROCEDURE:    1. Right sacroiliac joint injection (54278)    ANESTHESIA:  See Medication Administration Record    COMPLICATIONS: None. PHYSICIAN:  Elizabeth Dangelo DO    PRE-PROCEDURE NOTE:  Pre-procedural assessment of the patient was performed including a limited history and physical examination. The details of the procedure were discussed with the patient, including the risks, benefits and alternative options and an informed consent was obtained. The patients NPO status and availability of a responsible adult to escort the patient following the procedure were confirmed. PROCEDURE NOTE:  The patient was brought to the procedure suite and positioned on the fluoroscopy table in the prone position. Physiologic monitors were applied and supplemental oxygen was administered via nasal cannula. The skin was prepped in the standard surgical fashion and sterile drapes were applied over the procedure site.  Please refer to the Flowsheet for documentation of the patients vital signs and the Medication Administration Record for any oral and/or intravenous sedation administered prior to or during the procedure. 1% Lidocaine was utilized for local anesthesia. Under slight contralateral oblique fluoroscopic guidance, a 25-gauge, 3.5-inch short bevel spinal needle was advanced into the SI joint from the posteriomedial approach. After negative aspiration 0.5-1.0 mL of contrast dye solution was injected to ensure intra-articular uptake and nonvascular spread. Following this, 3 mL of a solution comprised of a mixture of triamcinolone (40 mg/mL) and 1% lidocaine was injected after negative aspiration of blood, air, or fluid. The needle was flushed and removed intact. The area was thoroughly cleaned and sterile bandages applied as necessary. The patient tolerated the procedure well and vital signs remained stable throughout the procedure. POST-PROCEDURE COURSE:   The patient was escorted from the procedure suite in satisfactory condition and recovered per facility protocol based on the type of procedure performed and/or the sedation utilized. The patient did not experience any adverse events and remained hemodynamically stable during the post-procedure period. Postprocedure pain relief with provocation was 100%    DISCHARGE NOTE:  Upon discharge, the patient was able to tolerate fluids and was in no acute distress. The patient was oriented to person, place and time and vital signs were stable. Appropriate post-procedure instructions were provided and explained to the patient in detail and all questions were answered.     Alex Louise DO 6/12/2019 1120

## 2019-06-20 ENCOUNTER — TELEPHONE (OUTPATIENT)
Dept: PAIN MANAGEMENT | Age: 44
End: 2019-06-20

## 2019-06-20 NOTE — TELEPHONE ENCOUNTER
1. Ms. Abdiaziz Lord was contacted for follow-up status post Right sacroiliac joint injection  on June 12, 2019.  She reports:    Pre-procedure numerical pain score: 7/10  Post-procedure numerical pain score immediately after: 2/10  Duration of relief post-procedure (if applicable): 1 weeks  Improvement in functional activities (if applicable): Yes  Percentage of overall improvement: 90%    COMMENTS:

## 2019-06-28 ENCOUNTER — HOSPITAL ENCOUNTER (OUTPATIENT)
Dept: BARIATRICS/WEIGHT MGMT | Age: 44
Discharge: HOME | End: 2019-06-28

## 2019-06-28 ENCOUNTER — DOCUMENTATION ONLY (OUTPATIENT)
Dept: BARIATRICS/WEIGHT MGMT | Age: 44
End: 2019-06-28

## 2019-06-28 NOTE — PROGRESS NOTES
[address] [facility] [address], [address]    Patient's Name: Hannah Wilson   Age: 53 y.o. YOB: 1975   Sex: female    Date:   6/21/19    Insurance:            Session: 5 of 6  Revision:   Surgeon:  Dr. Tiffanie Rivas    Height: 5 f 7 Weight:    275      Lbs. BMI: 43.2   Pounds Lost since last month: 3               Pounds Gained since last month: 0    Starting Weight: 284   Previous Months Weight: 278  Overall Pounds Lost: 9 Overall Pounds Gained: 0      Do you smoke? None    Alcohol intake:  Number of drinks at a time:  None  Number of times a week: None    Class Guidelines    Guidelines are reviewed with patient at the start of every class. 1. Patient understands that weight loss trial classes must be consecutive. Patient understands if they miss a class, it is their responsibility to contact me to reschedule class. I will reach out to patient after their first no show. 2.  Patient understands the expectations that weight maintenance/weight loss is expected during the classes. Failure to demonstrate changes may result in one extra month of weight loss trial, followed by going back to see the surgeon. Patient understands that they CAN NOT gain any weight during the weight loss trial.  Gaining weight will result in extra classes. 3. Patient is also instructed to be doing their labs, blood work, psych visit, support group and any other test that the surgeon has used while they are working on their weight loss trial.  4.  Patient was instructed to bring their blue binder to every class and appointment. Other Pertinent Information:     Changes Made Since Last Class: Received a steroid injection, so she can walk more. Eating Habits and Behaviors    Today in class, I reviewed a power point that discussed Nutrition, Behavior, and Exercise changes to start working on.   Some of the eating behaviors that we discussed included the importance of eating breakfast.  We talked about some of the reasons that people don't eat breakfast and food choices that would be appropriate. We also talked about avoiding liquid calories. Patient is encouraged to aim for 64 ounces of fluid per day and trying to get them from water, Crystal Light, sugar free drinks. We also talked about eating out options that are healthier. Patient was encouraged to always request sauces and dressings on the side and request a salad, broth-based soup, or vegetables in place of fries. Patient's current diet habits include: 2-3 meals per day. She is doing yogurt and a protein shake for breakfast.  Lunch is fish, shrimp, and vegetables. Barbar Bar is the same. She is snacking on peanuts, yogurt, or chocolate. She is eating out 1-2 x a month        Physical Activity/Exercise    Comments: We talked about exercise. Patient was given reasons of why exercise is so important and how that can help with their long-term success. I have encouraged patient to get a support system to help with the activity. Currently for activity, patient is doing walking all day at work. Behavior Modification       Comments: We also talked about behavior modifications. We talked about eating triggers, such as eating in front of the TV and solutions, such as making the TV a no eating zone. If patient is eating out out of emotion, food will only temporarily solve that. Patient is encouraged to HALT and assess if they are eating because they are Hungry, or out of emotions: Anxious, Lonely, Tired, which the food will only temporarily solve. We also talked about ways to prevent relapse. Goals that patient wants to work on includes:  1. Cut out all caffeine  2. Exercise more.       [address] Sly [address] RD  6/21/19

## 2019-07-19 ENCOUNTER — HOSPITAL ENCOUNTER (OUTPATIENT)
Dept: BARIATRICS/WEIGHT MGMT | Age: 44
Discharge: HOME | End: 2019-07-19

## 2019-07-19 ENCOUNTER — DOCUMENTATION ONLY (OUTPATIENT)
Dept: BARIATRICS/WEIGHT MGMT | Age: 44
End: 2019-07-19

## 2019-07-19 NOTE — PROGRESS NOTES
[address] [facility] [address], [address]    Patient's Name: Nereida Shi   Age: 53 y.o. YOB: 1975   Sex: female    Date:   7/19/2019    Insurance:              Session: 6 of 6  Revision:    Surgeon:  Dr. Cliff Vivar    Height: 5 f 7   Weight:    280      Lbs. BMI: 43.9   Pounds Lost since last month: 0                 Pounds Gained since last month: 5    Starting Weight: 284     Previous Months Weight: 275  Overall Pounds Lost: 4   Overall Pounds Gained: 0      Do you smoke? None    Alcohol intake:  Number of drinks at a time:  None  Number of times a week: None    Class Guidelines    Guidelines are reviewed with patient at the start of every class. 1. Patient understands that weight loss trial classes must be consecutive. Patient understands if they miss a class, it is their responsibility to contact me to reschedule class. I will reach out to patient after their first no show. 2.  Patient understands the expectations that weight maintenance/weight loss is expected during the classes. Failure to demonstrate changes may result in one extra month of weight loss trial, followed by going back to see the surgeon. Patient understands that they CAN NOT gain any weight during the weight loss trial.  Gaining weight will result in extra classes. 3. Patient is also instructed to be doing their labs, blood work, psych visit, support group and any other test that the surgeon has used while they are working on their weight loss trial.  4.  Patient was instructed to bring their blue binder to every class and appointment. Other Pertinent Information:     Changes Made Since Last Class: Increased more water. Eating Habits and Behaviors    Today in class, we started talking about the key diet principles. We first focused on stopping liquid calories. Patient was also educated on carbohydrates.   Patient was instructed to start cutting out bread, rice, and pasta from the diet and start focusing more on meat and vegetables. I then gave a power point, which focused on Label Reading. In class, I gave patients a labels and we worked through a series of questions to help patients have a better understanding of label reading. Patient was instructed to review the serving size. Patient was encouraged to focus on protein and carbohydrates. We also did a few label reading activities to help the patient become more familiar with label reading. Patient's current diet habits include: 2-3 meals per day. Patient is doing yogurt and protein shake for breakfast.t  Lunch is fish, shrimp, or vegetables. Dinner is salmon or vegetables. Patient is snacking on nuts and cookies. Patient is drinking water and crystal light. Patient is eating out 1-2 x a month and is making better choices. Physical Activity/Exercise    Comments: We talked about exercise. Patient was given reasons of why exercise is so important and how that can help with their long-term success. I have encouraged patient to get a support system to help with the activity. Currently for activity, patient is doing very little stating her pain. Behavior Modification       Comments:  Behavior modifications were reinforced. This included not eating in front of the TV, which could lead to bigger portions and eating when one is not hungry. We also talked about the importance of eating 3 meals per day. Patient was encouraged to food journal to keep their daily carbohydrates less than 30 grams per meal.      Goals that patient wants to work on includes:  1. Exercising more  2. No sweets.       Mega Manning [address] RD  7/19/2019

## 2019-07-19 NOTE — PROGRESS NOTES
Nutrition Evaluation    Patient's Name: Riley Shah   Age: 53 y.o. YOB: 1975   Sex: female    Height: 5 f 7 Weight: 280 BMI:  43.9  Starting Weight:  284        Smoking Status:  None  Alcohol Intake:  Number of Drinks at a Time: None  Number of Times a Week: None    Changes made during classes include:  Decrease portion sizes  Increases water intake  Exercise plan   Talking to my support group        Two things that patient learned during this weight loss trial:  How to read labels  Proper foods to eat    Summary:  I feel that Riley Shah has demonstrated appropriate diet changes and is ready to move forward with surgery. Patient has been briefed on the importance of the protein drinks, vitamins, and the transition of the diet stages. Patient understands that the long-term diet will focus on protein and vegetables. Patient understand the effects of carbohydrates after surgery and what reactive hypoglycemia is. Patient is aware that they will be attending pre-op class 2 weeks before surgery and will get more detailed information on the post-op diet guidelines. Patient will see me again at 6 weeks post-op. At this 6 week visit, RD will assess how patient is tolerating soft protein and advance to vegetables, if tolerating soft protein without difficulty. Patient will also see RD again at 9 months post-op. This visit will assess patient's compliance with current protocol, including diet, vitamins, protein shakes, and exercise. Post-op diet guidelines will be reinforced. RD is available for questions and to meet with patient outside of the 6 week and 9 month post-op visit. We spent a lot of time talking about the vitamins. Patient understands the importance of being compliant with the diet protocol and the complications and risks that can occur if they are non-compliant with the nutritional protocol. Patient has attended at least one support group.     Candidate for surgery: Yes  Re-evaluation Date:     Procedure:  Gastric Bypass       Casey PresidentMega Sly [address] RD  7/19/2019

## 2019-08-02 ENCOUNTER — OFFICE VISIT (OUTPATIENT)
Dept: SURGERY | Age: 44
End: 2019-08-02

## 2019-08-02 VITALS
BODY MASS INDEX: 42.44 KG/M2 | HEART RATE: 61 BPM | TEMPERATURE: 97.1 F | OXYGEN SATURATION: 99 % | WEIGHT: 270.4 LBS | HEIGHT: 67 IN | DIASTOLIC BLOOD PRESSURE: 82 MMHG | SYSTOLIC BLOOD PRESSURE: 119 MMHG | RESPIRATION RATE: 18 BRPM

## 2019-08-02 DIAGNOSIS — E66.01 MORBID (SEVERE) OBESITY DUE TO EXCESS CALORIES (HCC): Primary | ICD-10-CM

## 2019-08-02 DIAGNOSIS — E66.9 DIABETES MELLITUS TYPE 2 IN OBESE (HCC): ICD-10-CM

## 2019-08-02 DIAGNOSIS — K42.9 PERIUMBILICAL HERNIA: ICD-10-CM

## 2019-08-02 DIAGNOSIS — E11.69 DIABETES MELLITUS TYPE 2 IN OBESE (HCC): ICD-10-CM

## 2019-08-02 RX ORDER — PREGABALIN 50 MG/1
50 CAPSULE ORAL 2 TIMES DAILY
COMMUNITY
End: 2019-08-09 | Stop reason: SDUPTHER

## 2019-08-02 RX ORDER — HYDROXYZINE PAMOATE 50 MG/1
25 CAPSULE ORAL
COMMUNITY
End: 2021-10-30

## 2019-08-02 RX ORDER — HYDROCODONE BITARTRATE AND ACETAMINOPHEN 5; 325 MG/1; MG/1
1 TABLET ORAL 2 TIMES DAILY
COMMUNITY
End: 2019-08-09 | Stop reason: SDUPTHER

## 2019-08-02 NOTE — PATIENT INSTRUCTIONS
Take the following medications as noted. - If the medication is circled, take with a sip of water on the morning of surgery prior to reporting to the hospital  - If the medication has a line drawn through it, do not take that medication after starting your liquid diet before surgery  - If the medication has a star beside it, change its dosing as written beside it on the date written beside it. Current Outpatient Medications   Medication Sig Dispense Refill    HYDROcodone-acetaminophen (NORCO) 5-325 mg per tablet Take 1 Tab by mouth two (2) times a day. Indications: as needed      hydrOXYzine pamoate (VISTARIL) 50 mg capsule Take 25 mg by mouth three (3) times daily as needed for Itching.  ondansetron (ZOFRAN ODT) 4 mg disintegrating tablet Take 4 mg by mouth.  insulin glargine,hum.rec.anlog (LANTUS SC) 50 Units by SubCUTAneous route daily.  sucralfate (CARAFATE) 1 gram tablet Take 1 g by mouth four (4) times daily.  pantoprazole (PROTONIX) 40 mg tablet Take 40 mg by mouth daily.  docusate sodium (COLACE) 100 mg capsule Take 1 Cap by mouth two (2) times daily as needed for Constipation for up to 90 days. 60 Cap 2    senna (SENNA) 8.6 mg tablet Take 1 Tab by mouth daily as needed for Constipation. 30 Tab 2    capsaicin (CAPZASIN-HP) 0.1 % topical cream Apply cream to lower back and lower legs TID. Avoid use on damaged, broken or irritated skin. Avoid occlusive dressings or heat while using this medication. 42.5 g 2    glimepiride (AMARYL) 4 mg tablet Take 4 mg by mouth every morning.  topiramate (TOPAMAX) 100 mg tablet Take  by mouth two (2) times a day.  dicyclomine (BENTYL) 20 mg tablet Take 20 mg by mouth every eight (8) hours.  acetaminophen (TYLENOL EXTRA STRENGTH) 500 mg tablet Take 500 mg by mouth every six (6) hours as needed for Pain.  hydroCHLOROthiazide (HYDRODIURIL) 25 mg tablet Take 25 mg by mouth daily.       pregabalin (LYRICA) 50 mg capsule Lyrica 50 mg capsule

## 2019-08-02 NOTE — H&P (VIEW-ONLY)
Preop History and Physical Exam:    Akash Fraser is a 53 y.o. female who comes into the office today after completing the entirety of the bariatric preoperative protocol satisfactorily. she initially identified obesity at the age of 19 and at age 23 weighed 170lbs. she has tried a variety of unsupervised weight-loss attempts, but has yet to meet with lasting success. Today, the patient is Height: 5' 7\" (170.2 cm) tall, Weight: 122.7 kg (270 lb 6.4 oz) lbs for a Body mass index is 42.35 kg/m². It is due to their severe obesity, which is further complicated by diabetes and hypertension  that the patient is now seeking out bariatric surgery, specifically, the gastric bypass      Past Medical History:   Diagnosis Date    Diabetes ([address]     Essential hypertension     Neurological disorder     migraines       Past Surgical History:   Procedure Laterality Date    HX  SECTION      c section    HX CHOLECYSTECTOMY      HX ORTHOPAEDIC      pelvic repair       Current Outpatient Medications on File Prior to Visit   Medication Sig Dispense Refill    ondansetron (ZOFRAN ODT) 4 mg disintegrating tablet Take 4 mg by mouth.  insulin glargine,hum.rec.anlog (LANTUS SC) 50 Units by SubCUTAneous route daily.  sucralfate (CARAFATE) 1 gram tablet Take 1 g by mouth four (4) times daily.  pantoprazole (PROTONIX) 40 mg tablet Take 40 mg by mouth daily.  docusate sodium (COLACE) 100 mg capsule Take 1 Cap by mouth two (2) times daily as needed for Constipation for up to 90 days. 60 Cap 2    senna (SENNA) 8.6 mg tablet Take 1 Tab by mouth daily as needed for Constipation. 30 Tab 2    capsaicin (CAPZASIN-HP) 0.1 % topical cream Apply cream to lower back and lower legs TID. Avoid use on damaged, broken or irritated skin. Avoid occlusive dressings or heat while using this medication. 42.5 g 2    glimepiride (AMARYL) 4 mg tablet Take 4 mg by mouth every morning.       topiramate (TOPAMAX) 100 mg tablet Take  by mouth two (2) times a day.  dicyclomine (BENTYL) 20 mg tablet Take 20 mg by mouth every eight (8) hours.  acetaminophen (TYLENOL EXTRA STRENGTH) 500 mg tablet Take 500 mg by mouth every six (6) hours as needed for Pain.  hydroCHLOROthiazide (HYDRODIURIL) 25 mg tablet Take 25 mg by mouth daily.  pregabalin (LYRICA) 50 mg capsule Lyrica 50 mg capsule       No current facility-administered medications on file prior to visit.         Allergies   Allergen Reactions    Aspirin Other (comments)     Chest pain    Darvocet A500 [Propoxyphene N-Acetaminophen] Hives    Pcn [Penicillins] Hives    Sulfur Hives    Sulfa (Sulfonamide Antibiotics) Hives       Social History     Tobacco Use    Smoking status: Former Smoker     Last attempt to quit: 2/1/2016     Years since quitting: 3.5    Smokeless tobacco: Never Used   Substance Use Topics    Alcohol use: No    Drug use: No       Family History   Problem Relation Age of Onset    Hypertension Mother     Diabetes Mother     Hypertension Father     Diabetes Father        Family Status   Relation Name Status    Mother  Alive    Father  Alive       Review of Systems:  Positive in BOLD     CONST: Fever, weight loss, fatigue or chills  GI: Nausea - improved, vomiting, abdominal pain - improved, change in bowel habits, hematochezia, melena, and GERD   INTEG: Dermatitis, abnormal moles  HEENT: Recent changes in vision, vertigo, epistaxis, dysphagia and hoarseness  CV: Chest pain, palpitations, HTN, edema and varicosities  RESP: Cough, shortness of breath, wheezing, hemoptysis, snoring and reactive airway disease  : Hematuria, dysuria, frequency, urgency, nocturia and stress urinary incontinence   MS: Weakness, joint pain and arthritis - hips and back  ENDO: Diabetes - improved, thyroid disease, polyuria, polydipsia, polyphagia, poor wound healing, heat intolerance, cold intolerance  LYMPH/HEME: Anemia, bruising and history of blood transfusions  NEURO: Dizziness, headache - improved, fainting, seizures and stroke  PSYCH: Anxiety and depression    Physical Exam    Visit Vitals  Resp 18   Ht 5' 7\" (1.702 m)   Wt 122.7 kg (270 lb 6.4 oz)   BMI 42.35 kg/m²       General: 37 y.o.) female in no acute distress. Morbidly obese in abdomen - android pattern  HEENT: Normocephalic, atraumatic, Pupils equal and reactive, nasopharynx clear, oropharynx clear and moist without lesions  NECK: Supple, no lymphadenopathy, thyromegaly, carotid bruits or jugular venous distension. trachea midline  RESP: Clear to auscultation bilaterally, no wheezes, rhonchi, or rales, normal respiratory excursion  CV: Regular rate and rhythm, no murmurs, rubs or gallops. 3+/4 pulses in bilateral dorsalis pedis and posterior tibialis. No distal edema or varicosities. ABD: Soft, nontender, nondistended, normoactive bowel sounds, no hepatosplenomegaly, easily palpable costal margins, android distribution, healed lap scars, small supraumbilical epigastric hernia with incarcerated fat, healed pfannenstiel incision  Extremities: Warm, well perfused, no tenderness or swelling, normal gait/station  Neuro: Sensation and strength grossly intact and symmetrical  Psych: Alert and oriented to person, place, and time. Studies:    LABS: within normal limits except for A!C, Vit D and TSH - all improved since then  EKG: without significant abnormalities  NUTRITIONAL EVALUATION has deemed the patient a good candidate for weight loss surgery  PSYCHIATRIC EVALUATION has deemed the patient a good candidate for weight loss surgery  PCP - cleared euthyroid    Impression:    Sarah Bond is a 53 y.o. female who is suffering from morbid obesity and comorbidities including adult onset diabetes mellitus and hypertensionwho would benefit from bariatric surgery. We've discussed the restrictive and malabsorptive nature of the gastric bypass and compared and contrasted with the sleeve gastrectomy.   The patient understands the likelihood of losing approximately 80% of their excess weight in 12 to 18 months. She also understands the risks including but not limited to bleeding, infection, need for reoperation, anastomotic ulcers, leaks and strictures, bowel obstruction secondary to adhesions and internal hernias, DVT, PE, heart attack, stroke, and death. Patient also understands risks of inadequate weight loss, excess weight loss, vitamin insufficiency, protein malnutrition, excess skin, and loss of hair. We have reviewed the components of a successful postoperative course including requirement for a high protein, low carbohydrate diet, 60 oz a day of zero calorie liquids, daily vitamin supplementation, daily exercise, regular follow-up, and participation in support groups. We have reviewed the preoperative liver shrinking clear liquid diet, as well as reviewed any medication changes required while on the clear liquid diet. In addition, the patient understands that all medications to be taken during the first 8 weeks postoperatively can be taken whole as long as the medication is approximately the size of a Anthony 325 mg aspirin tablet in size. The patient further understands that it is his/her responsibility to review these and verify with their primary care doctor and pharmacist that all medications are of the appropriate size. We will schedule the patient for laparoscopic gastric bypass in the near future.

## 2019-08-02 NOTE — PROGRESS NOTES
Preop History and Physical Exam:    Veronica Em is a 53 y.o. female who comes into the office today after completing the entirety of the bariatric preoperative protocol satisfactorily. she initially identified obesity at the age of 19 and at age 23 weighed 170lbs. she has tried a variety of unsupervised weight-loss attempts, but has yet to meet with lasting success. Today, the patient is Height: 5' 7\" (170.2 cm) tall, Weight: 122.7 kg (270 lb 6.4 oz) lbs for a Body mass index is 42.35 kg/m². It is due to their severe obesity, which is further complicated by diabetes and hypertension  that the patient is now seeking out bariatric surgery, specifically, the gastric bypass      Past Medical History:   Diagnosis Date    Diabetes ([address]     Essential hypertension     Neurological disorder     migraines       Past Surgical History:   Procedure Laterality Date    HX  SECTION      c section    HX CHOLECYSTECTOMY      HX ORTHOPAEDIC      pelvic repair       Current Outpatient Medications on File Prior to Visit   Medication Sig Dispense Refill    ondansetron (ZOFRAN ODT) 4 mg disintegrating tablet Take 4 mg by mouth.  insulin glargine,hum.rec.anlog (LANTUS SC) 50 Units by SubCUTAneous route daily.  sucralfate (CARAFATE) 1 gram tablet Take 1 g by mouth four (4) times daily.  pantoprazole (PROTONIX) 40 mg tablet Take 40 mg by mouth daily.  docusate sodium (COLACE) 100 mg capsule Take 1 Cap by mouth two (2) times daily as needed for Constipation for up to 90 days. 60 Cap 2    senna (SENNA) 8.6 mg tablet Take 1 Tab by mouth daily as needed for Constipation. 30 Tab 2    capsaicin (CAPZASIN-HP) 0.1 % topical cream Apply cream to lower back and lower legs TID. Avoid use on damaged, broken or irritated skin. Avoid occlusive dressings or heat while using this medication. 42.5 g 2    glimepiride (AMARYL) 4 mg tablet Take 4 mg by mouth every morning.       topiramate (TOPAMAX) 100 mg tablet Take  by mouth two (2) times a day.  dicyclomine (BENTYL) 20 mg tablet Take 20 mg by mouth every eight (8) hours.  acetaminophen (TYLENOL EXTRA STRENGTH) 500 mg tablet Take 500 mg by mouth every six (6) hours as needed for Pain.  hydroCHLOROthiazide (HYDRODIURIL) 25 mg tablet Take 25 mg by mouth daily.  pregabalin (LYRICA) 50 mg capsule Lyrica 50 mg capsule       No current facility-administered medications on file prior to visit.         Allergies   Allergen Reactions    Aspirin Other (comments)     Chest pain    Darvocet A500 [Propoxyphene N-Acetaminophen] Hives    Pcn [Penicillins] Hives    Sulfur Hives    Sulfa (Sulfonamide Antibiotics) Hives       Social History     Tobacco Use    Smoking status: Former Smoker     Last attempt to quit: 2/1/2016     Years since quitting: 3.5    Smokeless tobacco: Never Used   Substance Use Topics    Alcohol use: No    Drug use: No       Family History   Problem Relation Age of Onset    Hypertension Mother     Diabetes Mother     Hypertension Father     Diabetes Father        Family Status   Relation Name Status    Mother  Alive    Father  Alive       Review of Systems:  Positive in BOLD     CONST: Fever, weight loss, fatigue or chills  GI: Nausea - improved, vomiting, abdominal pain - improved, change in bowel habits, hematochezia, melena, and GERD   INTEG: Dermatitis, abnormal moles  HEENT: Recent changes in vision, vertigo, epistaxis, dysphagia and hoarseness  CV: Chest pain, palpitations, HTN, edema and varicosities  RESP: Cough, shortness of breath, wheezing, hemoptysis, snoring and reactive airway disease  : Hematuria, dysuria, frequency, urgency, nocturia and stress urinary incontinence   MS: Weakness, joint pain and arthritis - hips and back  ENDO: Diabetes - improved, thyroid disease, polyuria, polydipsia, polyphagia, poor wound healing, heat intolerance, cold intolerance  LYMPH/HEME: Anemia, bruising and history of blood transfusions  NEURO: Dizziness, headache - improved, fainting, seizures and stroke  PSYCH: Anxiety and depression    Physical Exam    Visit Vitals  Resp 18   Ht 5' 7\" (1.702 m)   Wt 122.7 kg (270 lb 6.4 oz)   BMI 42.35 kg/m²       General: 37 y.o.) female in no acute distress. Morbidly obese in abdomen - android pattern  HEENT: Normocephalic, atraumatic, Pupils equal and reactive, nasopharynx clear, oropharynx clear and moist without lesions  NECK: Supple, no lymphadenopathy, thyromegaly, carotid bruits or jugular venous distension. trachea midline  RESP: Clear to auscultation bilaterally, no wheezes, rhonchi, or rales, normal respiratory excursion  CV: Regular rate and rhythm, no murmurs, rubs or gallops. 3+/4 pulses in bilateral dorsalis pedis and posterior tibialis. No distal edema or varicosities. ABD: Soft, nontender, nondistended, normoactive bowel sounds, no hepatosplenomegaly, easily palpable costal margins, android distribution, healed lap scars, small supraumbilical epigastric hernia with incarcerated fat, healed pfannenstiel incision  Extremities: Warm, well perfused, no tenderness or swelling, normal gait/station  Neuro: Sensation and strength grossly intact and symmetrical  Psych: Alert and oriented to person, place, and time. Studies:    LABS: within normal limits except for A!C, Vit D and TSH - all improved since then  EKG: without significant abnormalities  NUTRITIONAL EVALUATION has deemed the patient a good candidate for weight loss surgery  PSYCHIATRIC EVALUATION has deemed the patient a good candidate for weight loss surgery  PCP - cleared euthyroid    Impression:    Sarah Bond is a 53 y.o. female who is suffering from morbid obesity and comorbidities including adult onset diabetes mellitus and hypertensionwho would benefit from bariatric surgery. We've discussed the restrictive and malabsorptive nature of the gastric bypass and compared and contrasted with the sleeve gastrectomy.   The patient understands the likelihood of losing approximately 80% of their excess weight in 12 to 18 months. She also understands the risks including but not limited to bleeding, infection, need for reoperation, anastomotic ulcers, leaks and strictures, bowel obstruction secondary to adhesions and internal hernias, DVT, PE, heart attack, stroke, and death. Patient also understands risks of inadequate weight loss, excess weight loss, vitamin insufficiency, protein malnutrition, excess skin, and loss of hair. We have reviewed the components of a successful postoperative course including requirement for a high protein, low carbohydrate diet, 60 oz a day of zero calorie liquids, daily vitamin supplementation, daily exercise, regular follow-up, and participation in support groups. We have reviewed the preoperative liver shrinking clear liquid diet, as well as reviewed any medication changes required while on the clear liquid diet. In addition, the patient understands that all medications to be taken during the first 8 weeks postoperatively can be taken whole as long as the medication is approximately the size of a Anthony 325 mg aspirin tablet in size. The patient further understands that it is his/her responsibility to review these and verify with their primary care doctor and pharmacist that all medications are of the appropriate size. We will schedule the patient for laparoscopic gastric bypass in the near future.

## 2019-08-02 NOTE — PROGRESS NOTES
Chief Complaint   Patient presents with    Pre-op Exam     pt presents today to discuss surgical options. Pt ID confirmed    Weight Loss Metrics 8/2/2019 8/2/2019 6/12/2019 5/22/2019 5/22/2019 5/13/2019 3/1/2019   Pre op / Initial Wt 270.4 - - 278 - - -   Today's Wt - 270 lb 6.4 oz 278 lb - 278 lb 273 lb 284 lb   BMI - 42.35 kg/m2 43.54 kg/m2 - 43.54 kg/m2 42.76 kg/m2 44.48 kg/m2   Ideal Body Wt 140 - - 140 - - -   Excess Body Wt 130.4 - - 138 - - -   Goal Wt 166 - - 168 - - -   Wt loss to date 0 - - 0 - - -   % Wt Loss 0 - - 0 - - -   80% .32 - - 110.4 - - -       Body mass index is 42.35 kg/m².

## 2019-08-02 NOTE — LETTER
8/2/19 Patient: David Dangelo YOB: 1975 Date of Visit: 8/2/2019 Sandy Lazo MD 
[address] [facility] [address] [place], [place] [address]ry alec [address] VIA Facsimile: [phone] Dear Sandy Lazo MD, Thank you for referring Ms. Toñito Casiano to [place] [address] for evaluation. My notes for this consultation are attached. If you have questions, please do not hesitate to call me. I look forward to following your patient along with you.  
 
 
Sincerely, 
 
Caity Suarez MD

## 2019-08-09 ENCOUNTER — OFFICE VISIT (OUTPATIENT)
Dept: PAIN MANAGEMENT | Age: 44
End: 2019-08-09

## 2019-08-09 VITALS
HEART RATE: 80 BPM | TEMPERATURE: 98 F | SYSTOLIC BLOOD PRESSURE: 114 MMHG | OXYGEN SATURATION: 99 % | HEIGHT: 67 IN | DIASTOLIC BLOOD PRESSURE: 75 MMHG | RESPIRATION RATE: 20 BRPM | WEIGHT: 269 LBS | BODY MASS INDEX: 42.22 KG/M2

## 2019-08-09 DIAGNOSIS — Z79.899 ENCOUNTER FOR LONG-TERM (CURRENT) USE OF HIGH-RISK MEDICATION: ICD-10-CM

## 2019-08-09 DIAGNOSIS — M54.5 CHRONIC BILATERAL LOW BACK PAIN, WITH SCIATICA PRESENCE UNSPECIFIED: Primary | ICD-10-CM

## 2019-08-09 DIAGNOSIS — G89.29 CHRONIC BILATERAL LOW BACK PAIN, WITH SCIATICA PRESENCE UNSPECIFIED: Primary | ICD-10-CM

## 2019-08-09 DIAGNOSIS — G89.4 CHRONIC PAIN SYNDROME: ICD-10-CM

## 2019-08-09 DIAGNOSIS — M79.2 NEUROPATHIC PAIN: ICD-10-CM

## 2019-08-09 DIAGNOSIS — M70.61 TROCHANTERIC BURSITIS OF BOTH HIPS: ICD-10-CM

## 2019-08-09 DIAGNOSIS — M46.1 SACROILIITIS (HCC): ICD-10-CM

## 2019-08-09 DIAGNOSIS — M70.62 TROCHANTERIC BURSITIS OF BOTH HIPS: ICD-10-CM

## 2019-08-09 RX ORDER — HYDROCODONE BITARTRATE AND ACETAMINOPHEN 5; 325 MG/1; MG/1
1 TABLET ORAL 2 TIMES DAILY
Qty: 60 TAB | Refills: 0 | Status: SHIPPED | OUTPATIENT
Start: 2019-08-09 | End: 2019-09-08

## 2019-08-09 RX ORDER — PREGABALIN 100 MG/1
100 CAPSULE ORAL 2 TIMES DAILY
Qty: 60 CAP | Refills: 2 | Status: SHIPPED | OUTPATIENT
Start: 2019-08-09 | End: 2019-09-08

## 2019-08-09 NOTE — PATIENT INSTRUCTIONS

## 2019-08-09 NOTE — PROGRESS NOTES
Nursing Notes    Patient presents to the office today in follow-up. Patient rates her pain at 5/10 on the numerical pain scale. Reviewed medications with counts as follows:    Rx Date filled Qty Dispensed Pill Count Last Dose Short   norco 5/325 mg 05/31/19 60 4 yesterday no                                       reviewed YES  Any aberrancies noted on  NO  Last opioid agreement 06/12/19  Last urine drug screen 05/13/19    Comments:     POC UDS was not performed in office today    Any new labs or imaging since last appointment? NO    Have you been to an emergency room (ER) or urgent care clinic since your last visit? NO            Have you been hospitalized since your last visit? NO     If yes, where, when, and reason for visit? Have you seen or consulted any other health care providers outside of the [address] [place] [address]  since your last visit? YES    If yes, where, when, and reason for visit? Bariatric surgeon  Ms. Jasmyn Lugo has a reminder for a \"due or due soon\" health maintenance. I have asked that she contact her primary care provider for follow-up on this health maintenance.     3 most recent PHQ Screens 8/9/2019   Little interest or pleasure in doing things Not at all   Feeling down, depressed, irritable, or hopeless Not at all   Total Score PHQ 2 0   Trouble falling or staying asleep, or sleeping too much -   Feeling tired or having little energy -   Poor appetite, weight loss, or overeating -   Feeling bad about yourself - or that you are a failure or have let yourself or your family down -   Trouble concentrating on things such as school, work, reading, or watching TV -   Moving or speaking so slowly that other people could have noticed; or the opposite being so fidgety that others notice -   Thoughts of being better off dead, or hurting yourself in some way -   PHQ 9 Score -   How difficult have these problems made it for you to do your work, take care of your home and get along with others -

## 2019-08-09 NOTE — LETTER
8/9/2019 8:05 AM 
 
Ms. Tanmay Ames 
[address] [facility] [place] [address] To whom it may concern, Toño Manzanares currently holds a controlled substance contract for the management of chronic pain through our office. [place]ell prescriptions have been titrated to address chronic pain only. The contract is not intended to prevent or delay the management of acute pain or post-surgical pain by others. Moreover, it should not be assumed that the current prescriptions will be sufficient to address conditions resulting in acute pain or post-surgical pain. The reader is invited to consult with [place]'s Prescription Monitoring Program, as is the practice here, as an aid to responsible prescribing of controlled substances. Feel free to care for this patient as you feel is appropriate.   
 
 
 
 
Sincerely, 
 
 
HERBIE Tuttle

## 2019-08-09 NOTE — PROGRESS NOTES
Referred 3/23/17, source [address] - 10  Naloxone rescue - yes  Prophylactic bowel program - yes  Date of last OCA 5/13/19  Last UDS 5/13/19, consistent    date checked today, findings consistent      Today   Last Visit  PGIC - 5 & 4  5 & 4  CRUZITO - 44%  42%  COMM - 3  3      HPI:  David Dangelo is a 53 y.o. female here for f/u visit for ongoing evaluation of chronic lower back pain and widespread generalized pain. Pt was last seen here on 5/13/19. Pt denies interval changes on the character or distribution of pain. Pain is located at [facility] belt line, bilateral greater trochanter regions and bilateral feet. The pain is described as stabbing to aching with numbness in bilateral feet. Pain at its best is 4/10. Pain at its worse is 6/10. The pain is worsened by rainy weather, prolonged sitting, prolonged standing and prolonged walking. Symptoms are improved by Lyrica, Tylenol, Norco and capsaicin. Topamax for migraines. Pt has tried Cymbalta and Tramadol with no perceived benefit. Gabapentin with negative SE. Pt has never tried aquatic PT, massage therapy, yoga or chiropractor. Since last visit, she reports significant ~90% relief with right SIJ injection with Dr Rosa Sandy done 6/12/19. She has a gastric bypass surgery with Dr Corrie Malin on 8/27/19. Discussed with her that we are not managing her post-surgical pain; acute/post-surgical pain letter provided to her. After recovery from this procedure she would like to have right greater trochanteric bursa injection with Dr Rosa Sandy.       Interventional Pain Procedures:  6/12/19 - right SIJ injection with Dr Rosa Sandy with significant and continued relief    ROS:  Denies fever, chills, nausea, vomiting, diarrhea, constipation, abdominal pain, chest pain, shortness or breath/trouble breathing, weakness, trouble swallowing, changes in vision, changes in hearing, falls, dizziness, bladder incontinence, bowel incontinence, depression, anxiety, suicidal ideations, homicidal ideations or alcohol use. Opioid specific risk: diabetes, obesity, sleep disturbance. Vitals:    08/09/19 0746   BP: 114/75   Pulse: 80   Resp: 20   Temp: 98 °F (36.7 °C)   TempSrc: Oral   SpO2: 99%   Weight: 122 kg (269 lb)   Height: 5' 7\" (1.702 m)   PainSc:   5   PainLoc: Hip   LMP: 06/28/2019        Imaging:  Right Hip X-Ray 5/16/19  \"\"FINDINGS:  Orthopedic hardware plates and screws present at the right acetabulum, which obscures evaluation of the right hip. No convincing evidence for acute fracture or dislocation. Hypertrophic changes noted along the lateral aspect of the right acetabular roof. Mild enthesopathy noted adjacent to the greater trochanter. Left hip appears intact. Visualized portion of the bony pelvis and sacrum appear grossly intact. IMPRESSION:  No acute finding. Right acetabular hardware limits evaluation of the right hip. If there is high clinical concern for acute process, consider CT or MRI for further evaluation. \"\"      Physical exam:  Constitutional  -  AFVSS, no acute distress, endomorphic body habitus. A&OXs 3. Speech is clear and appropriate. HEENT -  Normocephalic, atraumatic. Conjugate gaze, clear sclerae. EOM intact. Neuro/Psych -  Mood is appropriate and patient is cooperative. Gait is within functional limits. Balance is within functional limits. Respiratory -  Non-labored breathing. Even rise of chest wall.       Primary Care Physician  Allison Zavala, [address] [address] [phone]      PHQ -- .  3 most recent PHQ Screens 8/9/2019   Little interest or pleasure in doing things Not at all   Feeling down, depressed, irritable, or hopeless Not at all   Total Score PHQ 2 0   Trouble falling or staying asleep, or sleeping too much -   Feeling tired or having little energy -   Poor appetite, weight loss, or overeating -   Feeling bad about yourself - or that you are a failure or have let yourself or your family down -   Trouble concentrating on things such as school, work, reading, or watching TV -   Moving or speaking so slowly that other people could have noticed; or the opposite being so fidgety that others notice -   Thoughts of being better off dead, or hurting yourself in some way -   PHQ 9 Score -   How difficult have these problems made it for you to do your work, take care of your home and get along with others -         Assessment/Plan:     ICD-10-CM ICD-9-CM    1. Chronic bilateral low back pain, with sciatica presence unspecified M54.5 724.2 pregabalin (LYRICA) 100 mg capsule    G89.29 338.29 HYDROcodone-acetaminophen (NORCO) 5-325 mg per tablet   2. Chronic pain syndrome G89.4 338. 4 pregabalin (LYRICA) 100 mg capsule      HYDROcodone-acetaminophen (NORCO) 5-325 mg per tablet   3. Trochanteric bursitis of both hips M70.61 726. 5 pregabalin (LYRICA) 100 mg capsule    M70.62  HYDROcodone-acetaminophen (NORCO) 5-325 mg per tablet   4. Sacroiliitis (HCC) M46.1 720.2 pregabalin (LYRICA) 100 mg capsule      HYDROcodone-acetaminophen (NORCO) 5-325 mg per tablet   5. Neuropathic pain M79.2 729.2 pregabalin (LYRICA) 100 mg capsule      HYDROcodone-acetaminophen (NORCO) 5-325 mg per tablet   6. Encounter for long-term (current) use of high-risk medication Z79.899 V58.69         1) Medications (opiod and non-opiod)  --I do not recommend long term opioid therapy for this patient at this time for their chronic pain; the risks outweigh the potential benefits. Pt currently taking Norco 5/325mg up to 2 times a day as needed with a total of 60 tabs to be used over 30 days. Their MME is 10. --Today, we will pause the weaning of patients opioid medication with a goal of being opioid free, pending safety and compliance. Pt was educated on signs and symptoms of opioid withdrawal and advised to call the clinic should these symptoms arise so that we may provide support as needed.   --Pt instructed to call 5-7 days before they run out of their medications for refill. --At next office visit, the plan is to provide patient with Norco 5/325mg up to 2 times a day as needed with a total of 55 tabs to be budgeted over 30 days. If patient has difficulty with the wean or difficulty with cravings we will consider referral to mental health for ongoing assessment and treatment for opioid use disorder. --Lyrica is helping and she would like to increase this to 100mg BID today. Continue with upward titration as needed and as tolerated. --Consider adding Elavil if needed after Lyrica has been optimized. --Continue topical capsaicin cream as needed. --Continue to optimize your Tylenol; pt can take a total of 3000mg acetaminophen per 24 hour period from all sources. --Pre-treat exacerbating activities with heat and post-treat with ice. 2) Restorative Therapies  --Continue to incorporate piriformis stretching into your HEP. --She has not obtained TENS as recommended. Adviser her she can obtain at low cost at local medical supply store or online. 3) Interventional Procedures  --Per Dr Delgado Haro, \"if no hardware concerns [with right hip xray] are found we will consider bilateral greater trochanteric bursa injections as needed\". She would like to wait until after her gastric bypass surgery recovery for this injection. 4) Behavorial Health Approaches  --Pt would benefit from referral to pain psychology for training and cognitive behavorial therapy, acceptance commitment therapy, biofeedback and mindfulness mediation and other modalities as indicated for treatment of chronic pain once this service is available at our clinic or by referral with Dr Karina Dutton. 5) Complementary & Integrative Health  --Close follow up PCP and weight loss surgeon Dr Aj Hall. --Follow up ongoing assessment and ongoing development of integrative and comprehensive plan of care for chronic pain. Goals:   To establish complementary and integrative plan of care to address chronic pain issues while minimizing pharmaceuticals to maximize patient's function improve quality of life. Education:  Patient again educated on the importance of strict compliance with the opioid care agreement while on opioid therapy. Patient also again educated that they should avoid driving while on chronic opioid therapy. Also advised to avoid alcohol and to avoid benzodiazepines while on opioid therapy. Handouts given regarding opioid safety. Follow-up and Dispositions    · Return in about 3 months (around 11/9/2019) for 30 min.               Social History     Socioeconomic History    Marital status:      Spouse name: Not on file    Number of children: Not on file    Years of education: Not on file    Highest education level: Not on file   Occupational History    Not on file   Social Needs    Financial resource strain: Not on file    Food insecurity:     Worry: Not on file     Inability: Not on file    Transportation needs:     Medical: Not on file     Non-medical: Not on file   Tobacco Use    Smoking status: Former Smoker     Last attempt to quit: 2/1/2016     Years since quitting: 3.5    Smokeless tobacco: Never Used   Substance and Sexual Activity    Alcohol use: No    Drug use: No    Sexual activity: Not on file   Lifestyle    Physical activity:     Days per week: Not on file     Minutes per session: Not on file    Stress: Not on file   Relationships    Social connections:     Talks on phone: Not on file     Gets together: Not on file     Attends Spiritism service: Not on file     Active member of club or organization: Not on file     Attends meetings of clubs or organizations: Not on file     Relationship status: Not on file    Intimate partner violence:     Fear of current or ex partner: Not on file     Emotionally abused: Not on file     Physically abused: Not on file     Forced sexual activity: Not on file   Other Topics Concern    Not on file   Social History Narrative    Not on file     Family History   Problem Relation Age of Onset    Hypertension Mother     Diabetes Mother     Hypertension Father     Diabetes Father      Allergies   Allergen Reactions    Aspirin Other (comments)     Chest pain    Darvocet A500 [Propoxyphene N-Acetaminophen] Hives    Pcn [Penicillins] Hives    Sulfur Hives    Sulfa (Sulfonamide Antibiotics) Hives     Past Medical History:   Diagnosis Date    Diabetes ([address]     Essential hypertension     Neurological disorder     migraines     Past Surgical History:   Procedure Laterality Date    HX  SECTION      c section    HX CHOLECYSTECTOMY      HX ORTHOPAEDIC  1996    pelvic repair     Current Outpatient Medications on File Prior to Visit   Medication Sig    hydrOXYzine pamoate (VISTARIL) 50 mg capsule Take 25 mg by mouth three (3) times daily as needed for Itching.  ondansetron (ZOFRAN ODT) 4 mg disintegrating tablet Take 4 mg by mouth every eight (8) hours as needed.  insulin glargine,hum.rec.anlog (LANTUS SC) 60 Units by SubCUTAneous route daily.  sucralfate (CARAFATE) 1 gram tablet Take 1 g by mouth four (4) times daily.  pantoprazole (PROTONIX) 40 mg tablet Take 40 mg by mouth daily.  docusate sodium (COLACE) 100 mg capsule Take 1 Cap by mouth two (2) times daily as needed for Constipation for up to 90 days.  senna (SENNA) 8.6 mg tablet Take 1 Tab by mouth daily as needed for Constipation.  capsaicin (CAPZASIN-HP) 0.1 % topical cream Apply cream to lower back and lower legs TID. Avoid use on damaged, broken or irritated skin. Avoid occlusive dressings or heat while using this medication.  glimepiride (AMARYL) 4 mg tablet Take 4 mg by mouth every morning.  topiramate (TOPAMAX) 100 mg tablet Take  by mouth two (2) times a day.  dicyclomine (BENTYL) 20 mg tablet Take 20 mg by mouth every eight (8) hours.     acetaminophen (TYLENOL EXTRA STRENGTH) 500 mg tablet Take 500 mg by mouth every six (6) hours as needed for Pain.  hydroCHLOROthiazide (HYDRODIURIL) 25 mg tablet Take 25 mg by mouth daily. No current facility-administered medications on file prior to visit. [address] was used for portions of this report. Unintended errors may occur.

## 2019-08-20 ENCOUNTER — DOCUMENTATION ONLY (OUTPATIENT)
Dept: MEDSURG UNIT | Age: 44
End: 2019-08-20

## 2019-08-20 ENCOUNTER — DOCUMENTATION ONLY (OUTPATIENT)
Dept: BARIATRICS/WEIGHT MGMT | Age: 44
End: 2019-08-20

## 2019-08-20 NOTE — PROGRESS NOTES
Attended pre op class. Patient was educated on instructions below. Patient was provided a copy and all instructions were understood. Patient  was provided a copy and instructed to call with any questions. Patient was provided phone number to call. Patient verbalized understanding. Reviewed Lovenox  7-Day Preoperative Liquid Diet  Benefits:  ? Reducing intake before surgery will shrink  your liver by depleting glycogen (a form of  stored energy). ? Reduced liver size gives better access to  stomach during surgery, which translates  to a safer surgery. ? Prevents the ?last supper? syndrome. Attended pre op class. Patient was educated on instructions below. Patient was provided a copy and all instructions were understood. Patient  was provided a copy and instructed to call with any questions. Patient was provided phone number to call. Patient verbalized understanding. ? Experiencing weight loss before the  procedure encourages postoperative  compliance and ?jump-starts? weight loss. Specifics:  ? Start seven days before surgery:  ____________________.  ? NO SOLID FOODS!!   Your surgery will be CANCELED if this  diet is not followed!!!  ? Minimum of 64 ounces of fluid daily,  including protein drinks. ? No added sugar or carbonated beverages. ? Continue to take all your prescribed  medication and your vitamin supplements  during this preoperative diet phase. Clear liquids:  ? Water. ? Sugar free, non-carbonated beverages  (crystal light, propel). ? Sugar free popsicles. ? Sugar free Jell-O.  ? Fat free, reduced sodium broth . ? Decaffeinated coffee or decaffeinated tea  with artificial sweeteners. Protein:  ? 60 grams of protein daily  (in liquid supplements). ? Pre-made protein drinks. ? Protein powder added to water. ? 3 gram rule -- limit sugar and fat to less  than 3 grams per 8 ounces.   ? 4 to 6 ounces of low fat/low sugar yogurt  OR cottage cheese three to four times  during the week.  [facility] Gastric Bypass and Sleeve Dietary Progression  Date of Surgery: _ ______________________________________________________________  Ice chips start once admitted on floor. Clear liquid diet.  Begin bariatric clear liquid diet on: _ ______________________________________________   64 ounces of fluid per day. ? Low calorie, low sugar, non-carbonated beverages:  -- Water, Crystal Light, Propel Water, Sugar Free Jell-O, Sugar Free Popsicles, bouillon. -- Start protein supplement during this stage (60 to 70 grams per day). -- Getting your fluid in and staying hydrated is your number one priority! ? The clear liquid diet will last for seven days. Bariatric soft and moist diet.  Begin bariatric soft and moist diet on: _____________________________________________  ? This stage of the diet will last for five weeks, unless otherwise instructed by your surgeon. ? Begin -- one week after surgery. ? End -- six weeks after surgery (or when you follow up with the registered dietitian). ? Soft, moist, high protein foods -- three meals per day plus protein supplements. -- Portions should emphasize on soft protein. -- Portions will be a MAXIMUM of 1 ounce of solid food and 2 to 3 ounces of cottage cheese and yogurt. -- Protein supplements should be between meals and provide 30 to 40 grams per day during  soft protein diet. -- Continue to get 64 ounces of fluid in per day. ? Protein foods that are OK (SLOW TRANSITION) on the soft and moist diet:  -- First week on soft protein should focus on yogurt, cottage cheese, eggs, VEGETARIAN refried  beans, black beans, kidney beans and white beans. (NO BAKED BEANS.)  -- Second through fourth weeks should focus on yogurt, cottage cheese, eggs, canned tuna,  canned chicken, tilapia and fish (needs to be soft enough to be cut up with a fork).   -- Fifth week on soft protein diet should focus on yogurt, cottage cheese, eggs, canned tuna,  canned chicken, tilapia, fish, salmon, chicken breast or turkey. Remember to continue to get 64 ounces of fluid daily on ALL stages. To be advanced to bariatric maintenance stage of the bariatric diet, follow up with the dietitian  six weeks after surgery, around: Things I Need to Know Before Surgery  1. How many ounces of fluid will you need to drink every day after surgery? _________________  2. List three examples of bariatric clear liquids. __________________________________________________________________________  __________________________________________________________________________  __________________________________________________________________________  3. What is the 3 gram rule? ______________________________________________________  __________________________________________________________________________  __________________________________________________________________________  4. What is the 30 minute rule? ____________________________________________________  __________________________________________________________________________  __________________________________________________________________________  5. What are examples of food you will be able to eat on the bariatric soft and moist diet?  __________________________________________________________________________  __________________________________________________________________________  __________________________________________________________________________  6. After surgery I will be able to use a straw. ? TRUE ? FALSE  7. After surgery I will NOT be able to drink carbonated beverages. ? TRUE ? FALSE  -- 26 --  PATIENT GUIDE TO BARIATRIC [address] [facility]  8. After surgery I will be able to drink caffeine. ? TRUE ? FALSE  9.  What four vitamins will you take after surgery?  _____________________________________ _ ___________________________________  _____________________________________ [facility] ___________________________________  10. How much exercise should you get daily? _________________________________________  __________________________________________________________________________  11. How long should it take you to eat a meal? _ _______________________________________  12. The calcium I have purchased is: ________________________________________________ . This has _________ mg per serving. I will need to take this _________ times a day. 13. The protein drink I have decided on is: ____________________________________________ . This has _________ grams of protein. I will need to drink _________ of my protein drinks  during the full liquid phase and _________ during the soft protein phase. My protein drinks  will give me _________ ounces of fluid. 14. After having a sleeve gastrectomy I will not be able to take NSAIDs  (non-steroidal anti-inflammatory drugs) for _________ weeks. 15. After having a gastric bypass I will not be able to take NSAIDs  (non-steroidal anti-inflammatory drugs) for _____________ weeks. ___________________________________________________    Medications  Please discuss all of your current medications with your surgeon at your preoperative appointment. Your surgeon will inform you regarding which medications to stop before surgery and which  medications you are to take the morning of surgery. Medications to Stop  To minimize the risk of blood loss during surgery,  you must avoid or stop taking medicines that  contain anti-inflammatories, blood thinners, arthritis  medications, and herbal supplements seven to 14 days  before your surgery. A nurse from pre-anesthesia  testing will review your list of medication. Your current  medications will be reviewed at your preoperative  appointment with your surgeon. Note: You may take prescribed narcotics, such as  Vicodin, Ultram and Neurontin.   Diabetic Medications  Adjustments in your diabetic medications will be discussed with your surgeon at your  preoperative appointment. Birth Control  In order to decrease your chance of getting a postoperative blood clot you will be required to stop  any oral contraceptive two weeks before your surgery date. During this time it is your responsibility  to use an effective form of birth control. You will be required to take a pregnancy test the morning  of surgery at the hospital and surgery will be canceled if you are pregnant. We strongly encourage  that you resume your birth control two weeks after surgery or after your first menstrual cycle  following surgery. -- 28 --  PATIENT GUIDE TO BARIATRIC [address] [place] [address] Rd  Non-Steroidal Anti-Inflammatory Drugs (NSAIDs)  Bypass:  One class of medications to avoid after Justin-en-Y gastric  bypass is NSAIDs. These can cause ulcers or stomach irritation  and are linked to a kind of ulcer called a marginal ulcer after  gastric bypass. Marginal ulcers can bleed or perforate. Usually  they are not fatal, but they can cause months or years of pain,  and are a common cause of re-operation and reversal of gastric  bypass. You will NEVER be able to take NSAIDs again. Your only choice for over the counter pain medication will be  Tylenol (acetaminophen). Steroid use can also be harmful to your stomach but may be necessary in some situations. Please consult with your bariatric surgeon and prescribing physician for approval.  Sleeve gastrectomy:  Following a sleeve gastrectomy you will not be allowed to take NSAIDs unless it has been  discussed and approved by your surgeon. Most commonly taken NSAIDs to be AVOIDED!! 1. Ibuprofen  2. Advil  3. Motrin  4. Excedrin  5. Aspirin  6. Celebrex  7. Naproxen  8. Aleve  9. Voltaren  10. Mobic  The following is a list of NSAIDS that are NEVER to be taken again after gastric bypass surgery:  Ibuprofen which is also known as:  Advil, Advil Childrens, Advil Macho Strength, Advil Liquigel,  Advil Migraine, Advil Pediatric, Childrens Ibuprofen Berry, Genpril, IBU, Midol IB, Midol Maximum  Strength Cramp Formula, Dolgesic, Motrin Childrens, Motrin IB, Motrin Infant Drops, Motrin Macho  Strength, Motrin Migraine Pain, Nuprin, Migraine Liqui-gels, Ibu-Tab 200, Cap-Profen, Tab-Profen,  Profen, Ibuprohm, Children?s Elixsure, IB Pro, Vicoprofen, Combunox, A-G Profen, Actiprofen,  Addaprin, Advil Infants Concentrated Drops, Caldolor, [place], Q-Profen, Ibifon 600, Ibren,  Perales, Midol Cramps & Bodyaches, [place], [place], [place], [place], [place], [facility]. -- 29 --  PATIENT GUIDE TO BARIATRIC [address] [facility]  Aspirin which has the brand name:  Arthritis Pain, Aspergum, Aspir-Low, Aspirin  Lite Coat, Anthony Aspirin, Bufferin, Easprin,  Ecotrin, Empirin, Fasprin, Flora, Halfprin,  [place] Aspirin, [place] Aspirin, Excedrin. Ketoprofen which is known as: Orudis KT,  Oruvail, Actron. Sulindac which is sold as: Clinoril. Naproxen is one of the most common NSAIDs,  and is sold as: Aleve, Naprosyn, EC-Naprosyn,  Naprelan, Anaprox, All Day Pain Relief,  Aflaxen, All Day Relief, Anaprox-DS, Comfort  Pac With Naproxen, Aleve Caplet, Aleve Easy  Open Arthritis, Aleve Gelcap, Anaprox-DS,  EC-Naprosyn, Leader Naproxen Sodium, Midol  Extended Relief, Naprelan 375, Naprelan  500, Naprelan 750, Prevacid NapraPac 375,  Prevacid NapraPac, Naprapac, Vimovo. Etodolac is sold under the brand name:  Lodine XL, Lodine. Fenoprofen can be found on the market as:  Nalfon, Nalfon 200. Diclofenac sold as: Arthrotec, Cataflam,  Voltaren, Cambia, Voltaren-XR, Zipsor. Flurbiprofen sold as: Asaid. Ketorolac is sold under the brand name:  Sprix, Toradol, Toradol IM, Toradol IV/IM. Piroxicam is found on the market as: Feldene. Indomethacin known as: Indocin SR, Indocin,  Indo-Carlie, Indomethegan, Indocin IV. Mefenamic Acid sold as: Ponstel.   Meloxicam is sold under the brand name:  Mobic. Nabumetone which is sold as: Relafen. Oxaprozin which has the brand name: Daypro. Ketoprofen which has the brand name:  Actron, Orudis KT, Orudis, Oruvail. Famotidine and Ibuprofen can be found as:  Duexis. Meclofenamate sold as: Meclomen. Tolmetin which can be found on the marked  as: Tolectin, Tolectin 600, Tolectin DS. Salsalate which is sold as: [facility]. Celecoxib which is sold as: Celebrex. -- 30 --  [facility]  Smoking  It is required that ALL patients stop smoking  (including e-cigarettes) and chewing tobacco  three months before surgery. Prior to surgery  your surgeon will order a test to verify that  you have quit. Your surgery will be canceled  if you are smoking. Smoking or chewing tobacco leads to  decreased blood supply to your body?s tissues  and delays healing. Smoking harms every  organ in the body and has been linked to:  ? Blood clots (the leading cause of death after  bariatric surgery). ? Marginal ulcers after gastric bypass. ? Heart disease. ? Stroke. ? Chronic obstructive pulmonary  (lung) disease. ? Increased risk for hip fracture. ? Cataracts. ? Cancer of the mouth, throat, esophagus,  larynx (voice box), stomach, pancreas,  bladder, cervix, and kidney. Pregnancy  It is in your best interest to avoid pregnancy for  12 to 18 months after surgery. Pregnancy during  the rapid weight loss phase can be dangerous  and harmful to both mother and fetus. Do you have an effective method of birth  control? Please consult with your OB/GYN or  PCP for consultation. Alcohol  Alcohol is not recommended after bariatric  surgery. Alcohol contains calories but minimal  nutrition and will work against your weight  loss goal. For example, wine contains twice  the calories per ounce that regular soda does.   The absorption of alcohol changes with gastric  bypass and gastric sleeve because an enzyme  in the stomach which usually begins to digest  alcohol is absent or greatly reduced making  alcohol more potent. Alcohol may also be absorbed more quickly  into the body after gastric bypass or gastric  sleeve. The absorbed alcohol will be more  potent, and studies have demonstrated  that obesity surgery patients reach a higher  alcohol level and maintain the higher levels for  a longer period than others. In some patients  alcohol use can increase and lead to alcohol  dependence or addiction. For all of these  reasons, it is recommended to avoid alcohol  after bariatric surgery. Things to do before surgery:  ? Start the preoperative liquid diet on: _____________________________________________  ? Stop all NSAIDS (see page 30) and aspirin seven days before my surgery: _________________ .  Rivas Wylie my doctor(PCP or surgeon) regarding stopping Coumadin, Plavix or  other blood thinners. ? Purchase bariatric clear liquids (Crystal Lite, sugar free Jell-O, broth, sugar free popsicles,  protein supplement) and bariatric soft and moist foods (low fat yogurt, cottage cheese, eggs,  tuna, fish, chicken) so that I?m prepared when I get home from the hospital.  ? Purchase all vitamins that will be required following surgery. o Chewable multivitamin -- two per day (ex: [place] Complete). o Calcium Citrate -- 1,500 milligrams (500 milligrams, three times a day). o Vitamin B-12 -- 1,000 micrograms daily. o Vitamin D3 -- 5,000 IU daily. ? Create a system to keep track of how many ounces of fluid I am drinking daily  o Postoperative GOAL = minimum of 64 ounces per day. ? Purchase a protein supplement that I like.  o Brand: _ _________________________________________________________________ .  o Ounces: _________________________________________________________________ .  Wei Bhatt of protein per serving: _________________________________________________ .   Before [address]  ? [place] your teeth -- upon awakening, you may brush your teeth and rinse with water, but do not  swallow the water. ? Take medication -- take only the medications as instructed by your provider with a small sip of  water as soon as you get up. ? Wear proper clothing that is loose-fitting and easily removed. ? Avoid back zippers and pantyhose. ? Please remove ALL jewelry (leave ALL jewelry and valuables at home). ? Avoid using perfumes, deodorant, shaving creams or any scented lotions. ? Bring a case with your name on it to hold your eyeglasses, contact lenses, hearing aids  and dentures. If you do not see your providers clean their hands, please ask them to do so.  ? Family and friends who visit you should not touch the surgical wound or dressings. ? Family and friends should clean their hands with soap and water or an alcohol-based hand  rub before and after visiting you. If you do not see them clean their hands, ask them to clean  their hands. ? Make sure you understand how to care for your incision before you leave the hospital.  ? Always clean your hands before and after caring for your incision. ? Make sure you know who to contact if you have questions or problems after you get home. ? If you have any symptoms of an infection, such as redness and pain at the surgery site, drainage,  or fever, call your doctor immediately. ? Ask your nursing staff to help you out of bed to walk within five hours of arriving at your  hospital room. Getting out of bed to walk helps to decrease complications, such as blood clots  and pneumonia. Length of Stay  You are many types of pain control methods that are available to control discomfort. Effective  pain control will allow you to be up and walking shortly after surgery. Your doctor will choose  the method that is right for you. Regardless of the method of pain medication being used, it is  important for you to communicate with your nurse if the pain medication is not enough.  Call your  nurse for pain medication when the pain is moderate instead of waiting for when pain is severe. What type of pain should I expect? ? Abdominal pain  ? Rib pain  ? Shoulder pain  ? Hood Matthews feeling in the center of your chest  Nausea:  Nausea following bariatric surgery is very  common. Causes include:  ? Anesthesia  ? Drinking too fastYou will be allowed 1 to 2 ounces of ice chips per hour when you are admitted to the floor. They are solely for your comfort. They are not required. ? You will be expected to walk the night of your surgery. Please ask your nurse or nursing assistant  for help the first time you walk. Please do not walk if you still feel groggy or unsteady on your feet. ? Your pain will be controlled with oral and IV pain medication on the day of surgery. ? In order to prevent pneumonia after surgery you please use your incentive spirometer (ICS)  at least 10 times per hour (see below). ? Pain medication  ? Surgery itself  I understand the serious potential complications include: deep venous thrombosis/pulmonary  embolism (blood clots in the legs and or lungs); gastrointestinal leak (leakage of digestive contents  into the abdomen); sepsis (serious infection); injury to adjacent organs such as esophagus, spleen,  pancreas, liver, diaphragm (requiring intervention or surgical removal); excessive bleeding; bowel  obstruction (blockage of the intestines); or organ failure. I am informed that these complications  can be life threatening. The overall mortality rate (risk of death) from bariatric surgery is close to  0.1 percent (1/1,000), but can be as high as 0.5 percent (1/200) for some patients. Patient initials: ______________  I understand that complications requiring re-operation may occur, either immediately after initial  surgery, or later in the recovery process.   Patient initials: ______________  Other potential complications which I may have include: wound infections or seromas  (fluid collection under skin); hernias (breakdown of tissue holding in abdominal contents);  gastritis or stomach ulcer (inflammation of the stomach); formation of gallstones; formation  of kidney stones or urinary tract infection; or pneumonia. Device related complications such as  foreign body reaction, band slippage, or erosion may occur with the adjustable gastric bandIwill  Pre-op instructions:  1. Shower with the antibacterial soap  the 3 days prior to surgery. 2. Pre-admission testing will contact you 1 week before surgery  3. [facility] will contact you the day before surgery to confirm your surgery time.  Email or call your surgery scheduler if you have not heard from them by 3pm  4. Nothing to eat or drink (NPO) after midnight the night before surgery.  Take any evening medications by 11pm  5. Wear comfortable clothing. 6. Do not bring any valuables. 7. Bring insurance card, prescription card, photo ID and credit card to the hospital.  **Discuss all home medications with your surgeon at you pre-op appointment. Your surgeon will inform you of what medications to stop before surgery and what medications to take the day of surgery. **STOP all NSAIDS (Ibuprofen, Aleve, Advil, Naprosyn etc.) and Aspirin 7 days before your surgery      Important Information:  1. No lifting over 15 lbs. for 6 weeks post-op  2. No driving for 4-63 days after surgery - must be off pain medication. 3. No smoking EVER again! 4. You will NOT be able to take any Non-Steroidal Anti-inflammatories (NSAIDS), Aspirin or Steroids  a. Bypass/revision patients - EVER again. (Tylenol only)  b. Sleeve patients - 6 weeks after surgery  5. Pulse/temperature- twice daily for 2 weeks post-op   6. Avoid pregnancy for 18 months  7. Key Diet principles:  a. Vitamin/mineral supplements-[place] Complete, Calcium citrate, Vitamin D3, Vitamin B12  b. Protein supplements - 60-70 grams/day  c. Minimum 64 oz. fluid per day  d.        What to Expect in the Hospital:  Pre-op area:  o Emergency door take elevator 2nd floor  o Arrive 1.5 hours before surgery  o Lovenox (blood thinner)  o Incentive Spirometer  o SCDs  o Sign consent  o Anesthesia  Start IV    Operating Room:    o Gastric bypass: 2- 2 ½ hours  o Sleeve gastrectomy: 1-1 ½ hours  o Revision: 2-3 hours    PACU(Post Anesthesia Care Unit):  o Nasal cannula  o EKG monitor  o Blood pressure cuff  o Pulse Ox  o Forbes Catheter  o SCDs  o No family allowed  o Minimum one hour  There are many types of pain control methods that are available to control discomfort. Effective  pain control will allow you to be up and walking shortly after surgery. Your doctor will choose  the method that is right for you. Regardless of the method of pain medication being used, it is  important for you to communicate with your nurse if the pain medication is not enough. Call your  nurse for pain medication when the pain is moderate instead of waiting for when pain is severe. What type of pain should I expect? ? Abdominal pain  ? Rib pain  ? Shoulder pain  ? Xiomara Gianna feeling in the center of your chest  Nausea:  Nausea following bariatric surgery is very  common. Causes include:  ? Anesthesia  ? Drinking too fast  ? Pain medication  ? Surgery itself    Expectations on your Day of Surgery  ? You will be allowed 1 to 2 ounces of ice chips per hour when you are admitted to the floor. They are solely for your comfort. They are not required. ? You will be expected to walk the night of your surgery. Please ask your nurse or nursing assistant  for help the first time you walk. Please do not walk if you still feel groggy or unsteady on your feet. ? Your pain will be controlled with oral and IV pain medication on the day of surgery.   ? In order to prevent pneumonia after surgery you please use your incentive spirometer (ICS)  at least 10 times per hour (see below    2 Central (Day of Surgery):  o Private room  o 1-2 oz. ice chips per hour   o IV Dilaudid  or liquid pain medication as needed for pain  o Discontinue estrada catheter  o Walk within 4 hours  o One family member can spend the night (must 18 years or older)  o Cell phone/computers - OK    2 Central (Post-op Day 1/Post-op Day 2):  o 7am - Gastrograffin swallow study (X-ray) for:  o All sleeve gastrectomy patients  o All revision patients   o Discontinue -nasal cannula and heart rate monitor  o Start bariatric clear liquid diet - water, crystal light, broth, Jell-O and protein supplement  o Slowly increase to 3 oz. clear liquids and 1 oz. protein supplement per hour  o Oral pain medication as needed for pain - communicate with your nurse  o Goal:  48 to 64 ounces, clear liquid per day preferable water  o Discharge instructions/Lovenox self-injection instructions   o WALK & WATER    Post-op Goals:  1. Void within 8 hours of your catheter being removed  2. Pain is well controlled with oral pain medication  3. Nausea is under control/No vomiting  4. Tolerating 3 oz. of clear liquids and 1 oz. protein supplement per hour for 3 consecutive hours. Post-op Follow-up Labs will need to be completed 1-2 weeks BEFORE your 3 month, 6 month and yearly visits. These labs are required and your appointment will be rescheduled if they are not completed. My surgical procedure and post-operative hospital stay has been discussed with me and I have been given the opportunity to ask any questions I may have.     Patient Signature: ____________________________  Date: _____________________

## 2019-08-20 NOTE — PROGRESS NOTES
CLINICAL NUTRITION PRE-OPERATIVE EDUCATION    Patient's Name: Jessica Pabon   Age: 53 y.o. YOB: 1975   Sex: female    Education & Materials Provided:   - Soft Protein Diet Shopping List  -  Supplemental Resource Guide: MVI, B12, Calcium Citrate, Vitamin D, Vitamin B1,   and iron recommendations  - Protein Supplement Information  - Fluid Requirements/ No Straws  - No Caffeine or Carbonation   - No alcohol              - No Snacks or No Concentrated Sweets     - Exercising   - Support System at [facility] of Support Group meetings. Support System after surgery includes: x     - Key Diet Principles            - Addressed Current Habits/Changes to Make   - Patient has been educated on the liquid diet to begin 1 week prior to surgery. Attendance of support group: Yes    Summary:  Patient has completed 6 visits with the Registered Dietitian. During these nutrition visits, we focused on dietary changes, behavior changes, and the importance of establishing an exercise routine. The diet protocol that patient was prescribed emphasized on low carbohydrates (less than 100 grams per day prior to surgery and 60-80 grams of protein per day). At today's session, patient was educated on the post-op diet protocol. Patient understands the importance of keeping total fat and sugar less than 3 grams per serving. Patient is aware of the transition of the diet stages and is aware that they will be on clear liquids for 7days, followed by soft protein for 5 weeks. Patient understands the body needs ~ 60-70 grams of protein per day. During the liquid phase, patient will need 60 grams of protein from shakes. Once eating soft protein, patient will only need 1 shake per day. Patient is aware of the importance of the vitamins and protein drinks. We spent a lot of time talking about the vitamins.   Patient understands the importance of being compliant with the diet protocol and the complications and risks that can occur if they are non-compliant with the nutritional protocol and non-compliant with the vitamins. Patient has also been educated on the pre-op liquid diet for 1 week. Patient understands that failure to comply in pre-op liquid diet could result in surgery being canceled. Patient's 6 week post-op nutrition appointment has been scheduled. At this 6 week visit, RD will assess how patient is tolerating soft protein and advance to vegetables, if tolerating soft protein without difficulty. Patient will also see RD again at 9 months post-op. This visit will assess patient's compliance with current protocol, including diet, vitamins, protein shakes, and exercise. Post-op diet guidelines will be reinforced. RD is available for questions and to meet with patient outside of the 6 week and 9 month post-op visit. Ok to proceed.      Candidate for surgery: Yes  Re-evaluation Date:     Mega Hendrickson [address] RD  8/20/2019

## 2019-08-20 NOTE — PROGRESS NOTES
Attended pre op class. Patient was educated on instructions below. Patient was provided a copy and all instructions were understood. Patient  was provided a copy and instructed to call with any questions. Patient was provided phone number to call. Patient verbalized understanding. Reviewed Lovenox  7-Day Preoperative Liquid Diet  Benefits:  ? Reducing intake before surgery will shrink  your liver by depleting glycogen (a form of  stored energy). ? Reduced liver size gives better access to  stomach during surgery, which translates  to a safer surgery. ? Prevents the ?last supper? syndrome. Attended pre op class. Patient was educated on instructions below. Patient was provided a copy and all instructions were understood. Patient  was provided a copy and instructed to call with any questions. Patient was provided phone number to call. Patient verbalized understanding. ? Experiencing weight loss before the  procedure encourages postoperative  compliance and ?jump-starts? weight loss. Specifics:  ? Start seven days before surgery:  ____________________.  ? NO SOLID FOODS!!   Your surgery will be CANCELED if this  diet is not followed!!!  ? Minimum of 64 ounces of fluid daily,  including protein drinks. ? No added sugar or carbonated beverages. ? Continue to take all your prescribed  medication and your vitamin supplements  during this preoperative diet phase. Clear liquids:  ? Water. ? Sugar free, non-carbonated beverages  (crystal light, propel). ? Sugar free popsicles. ? Sugar free Jell-O.  ? Fat free, reduced sodium broth . ? Decaffeinated coffee or decaffeinated tea  with artificial sweeteners. Protein:  ? 60 grams of protein daily  (in liquid supplements). ? Pre-made protein drinks. ? Protein powder added to water. ? 3 gram rule -- limit sugar and fat to less  than 3 grams per 8 ounces.   ? 4 to 6 ounces of low fat/low sugar yogurt  OR cottage cheese three to four times  during the week.  [facility] Gastric Bypass and Sleeve Dietary Progression  Date of Surgery: _ ______________________________________________________________  Ice chips start once admitted on floor. Clear liquid diet.  Begin bariatric clear liquid diet on: _ ______________________________________________   64 ounces of fluid per day. ? Low calorie, low sugar, non-carbonated beverages:  -- Water, Crystal Light, Propel Water, Sugar Free Jell-O, Sugar Free Popsicles, bouillon. -- Start protein supplement during this stage (60 to 70 grams per day). -- Getting your fluid in and staying hydrated is your number one priority! ? The clear liquid diet will last for seven days. Bariatric soft and moist diet.  Begin bariatric soft and moist diet on: _____________________________________________  ? This stage of the diet will last for five weeks, unless otherwise instructed by your surgeon. ? Begin -- one week after surgery. ? End -- six weeks after surgery (or when you follow up with the registered dietitian). ? Soft, moist, high protein foods -- three meals per day plus protein supplements. -- Portions should emphasize on soft protein. -- Portions will be a MAXIMUM of 1 ounce of solid food and 2 to 3 ounces of cottage cheese and yogurt. -- Protein supplements should be between meals and provide 30 to 40 grams per day during  soft protein diet. -- Continue to get 64 ounces of fluid in per day. ? Protein foods that are OK (SLOW TRANSITION) on the soft and moist diet:  -- First week on soft protein should focus on yogurt, cottage cheese, eggs, VEGETARIAN refried  beans, black beans, kidney beans and white beans. (NO BAKED BEANS.)  -- Second through fourth weeks should focus on yogurt, cottage cheese, eggs, canned tuna,  canned chicken, tilapia and fish (needs to be soft enough to be cut up with a fork).   -- Fifth week on soft protein diet should focus on yogurt, cottage cheese, eggs, canned tuna,  canned chicken, tilapia, fish, salmon, chicken breast or turkey. Remember to continue to get 64 ounces of fluid daily on ALL stages. To be advanced to bariatric maintenance stage of the bariatric diet, follow up with the dietitian  six weeks after surgery, around: Things I Need to Know Before Surgery  1. How many ounces of fluid will you need to drink every day after surgery? _________________  2. List three examples of bariatric clear liquids. __________________________________________________________________________  __________________________________________________________________________  __________________________________________________________________________  3. What is the 3 gram rule? ______________________________________________________  __________________________________________________________________________  __________________________________________________________________________  4. What is the 30 minute rule? ____________________________________________________  __________________________________________________________________________  __________________________________________________________________________  5. What are examples of food you will be able to eat on the bariatric soft and moist diet?  __________________________________________________________________________  __________________________________________________________________________  __________________________________________________________________________  6. After surgery I will be able to use a straw. ? TRUE ? FALSE  7. After surgery I will NOT be able to drink carbonated beverages. ? TRUE ? FALSE  -- 26 --  PATIENT GUIDE TO BARIATRIC [address] [facility]  8. After surgery I will be able to drink caffeine. ? TRUE ? FALSE  9.  What four vitamins will you take after surgery?  _____________________________________ _ ___________________________________  _____________________________________ Karl Zaldivar ___________________________________  10. How much exercise should you get daily? _________________________________________  __________________________________________________________________________  11. How long should it take you to eat a meal? _ _______________________________________  12. The calcium I have purchased is: ________________________________________________ . This has _________ mg per serving. I will need to take this _________ times a day. 13. The protein drink I have decided on is: ____________________________________________ . This has _________ grams of protein. I will need to drink _________ of my protein drinks  during the full liquid phase and _________ during the soft protein phase. My protein drinks  will give me _________ ounces of fluid. 14. After having a sleeve gastrectomy I will not be able to take NSAIDs  (non-steroidal anti-inflammatory drugs) for _________ weeks. 15. After having a gastric bypass I will not be able to take NSAIDs  (non-steroidal anti-inflammatory drugs) for _____________ weeks. ___________________________________________________    Medications  Please discuss all of your current medications with your surgeon at your preoperative appointment. Your surgeon will inform you regarding which medications to stop before surgery and which  medications you are to take the morning of surgery. Medications to Stop  To minimize the risk of blood loss during surgery,  you must avoid or stop taking medicines that  contain anti-inflammatories, blood thinners, arthritis  medications, and herbal supplements seven to 14 days  before your surgery. A nurse from pre-anesthesia  testing will review your list of medication. Your current  medications will be reviewed at your preoperative  appointment with your surgeon. Note: You may take prescribed narcotics, such as  Vicodin, Ultram and Neurontin.   Diabetic Medications  Adjustments in your diabetic medications will be discussed with your surgeon at your  preoperative appointment. Birth Control  In order to decrease your chance of getting a postoperative blood clot you will be required to stop  any oral contraceptive two weeks before your surgery date. During this time it is your responsibility  to use an effective form of birth control. You will be required to take a pregnancy test the morning  of surgery at the hospital and surgery will be canceled if you are pregnant. We strongly encourage  that you resume your birth control two weeks after surgery or after your first menstrual cycle  following surgery. -- 28 --  PATIENT GUIDE TO BARIATRIC [address] [place] [address] Rd  Non-Steroidal Anti-Inflammatory Drugs (NSAIDs)  Bypass:  One class of medications to avoid after Justin-en-Y gastric  bypass is NSAIDs. These can cause ulcers or stomach irritation  and are linked to a kind of ulcer called a marginal ulcer after  gastric bypass. Marginal ulcers can bleed or perforate. Usually  they are not fatal, but they can cause months or years of pain,  and are a common cause of re-operation and reversal of gastric  bypass. You will NEVER be able to take NSAIDs again. Your only choice for over the counter pain medication will be  Tylenol (acetaminophen). Steroid use can also be harmful to your stomach but may be necessary in some situations. Please consult with your bariatric surgeon and prescribing physician for approval.  Sleeve gastrectomy:  Following a sleeve gastrectomy you will not be allowed to take NSAIDs unless it has been  discussed and approved by your surgeon. Most commonly taken NSAIDs to be AVOIDED!! 1. Ibuprofen  2. Advil  3. Motrin  4. Excedrin  5. Aspirin  6. Celebrex  7. Naproxen  8. Aleve  9. Voltaren  10. Mobic  The following is a list of NSAIDS that are NEVER to be taken again after gastric bypass surgery:  Ibuprofen which is also known as:  Advil, Advil Childrens, Advil Macho Strength, Advil Liquigel,  Advil Migraine, Advil Pediatric, Childrens Ibuprofen Berry, Genpril, IBU, Midol IB, Midol Maximum  Strength Cramp Formula, Dolgesic, Motrin Childrens, Motrin IB, Motrin Infant Drops, Motrin Macho  Strength, Motrin Migraine Pain, Nuprin, Migraine Liqui-gels, Ibu-Tab 200, Cap-Profen, Tab-Profen,  Profen, Ibuprohm, Children?s Elixsure, IB Pro, Vicoprofen, Combunox, A-G Profen, Actiprofen,  Addaprin, Advil Infants Concentrated Drops, Caldolor, [place], Q-Profen, Ibifon 600, Ibren,  Perales, Midol Cramps & Bodyaches, [place], [place], [place], [place], [place], [facility]. -- 29 --  PATIENT GUIDE TO BARIATRIC [address] [place] [address]/HOSPITAL [facility]  Aspirin which has the brand name:  Arthritis Pain, Aspergum, Aspir-Low, Aspirin  Lite Coat, Anthony Aspirin, Bufferin, Easprin,  Ecotrin, Empirin, Fasprin, [place], Halfprin,  [place] Aspirin, [place] Aspirin, Excedrin. Ketoprofen which is known as: Orudis KT,  Oruvail, Actron. Sulindac which is sold as: Clinoril. Naproxen is one of the most common NSAIDs,  and is sold as: Aleve, Naprosyn, EC-Naprosyn,  Naprelan, Anaprox, All Day Pain Relief,  Aflaxen, All Day Relief, Anaprox-DS, Comfort  Pac With Naproxen, Aleve Caplet, Aleve Easy  Open Arthritis, Aleve Gelcap, Anaprox-DS,  EC-Naprosyn, Leader Naproxen Sodium, Midol  Extended Relief, Naprelan 375, Naprelan  500, Naprelan 750, Prevacid NapraPac 375,  Prevacid NapraPac, Naprapac, Vimovo. Etodolac is sold under the brand name:  Lodine XL, Lodine. Fenoprofen can be found on the market as:  Nalfon, Nalfon 200. Diclofenac sold as: Arthrotec, Cataflam,  Voltaren, Cambia, Voltaren-XR, Zipsor. Flurbiprofen sold as: Asaid. Ketorolac is sold under the brand name:  Sprix, Toradol, Toradol IM, Toradol IV/IM. Piroxicam is found on the market as: Feldene. Indomethacin known as: Indocin SR, Indocin,  Indo-Carlie, Indomethegan, Indocin IV. Mefenamic Acid sold as: Ponstel.   Meloxicam is sold under the brand name:  Mobic. Nabumetone which is sold as: Relafen. Oxaprozin which has the brand name: Daypro. Ketoprofen which has the brand name:  Actron, Orudis KT, Orudis, Oruvail. Famotidine and Ibuprofen can be found as:  Duexis. Meclofenamate sold as: Meclomen. Tolmetin which can be found on the marked  as: Tolectin, Tolectin 600, Tolectin DS. Salsalate which is sold as: [facility]. Celecoxib which is sold as: Celebrex. -- 30 --  [facility]  Smoking  It is required that ALL patients stop smoking  (including e-cigarettes) and chewing tobacco  three months before surgery. Prior to surgery  your surgeon will order a test to verify that  you have quit. Your surgery will be canceled  if you are smoking. Smoking or chewing tobacco leads to  decreased blood supply to your body?s tissues  and delays healing. Smoking harms every  organ in the body and has been linked to:  ? Blood clots (the leading cause of death after  bariatric surgery). ? Marginal ulcers after gastric bypass. ? Heart disease. ? Stroke. ? Chronic obstructive pulmonary  (lung) disease. ? Increased risk for hip fracture. ? Cataracts. ? Cancer of the mouth, throat, esophagus,  larynx (voice box), stomach, pancreas,  bladder, cervix, and kidney. Pregnancy  It is in your best interest to avoid pregnancy for  12 to 18 months after surgery. Pregnancy during  the rapid weight loss phase can be dangerous  and harmful to both mother and fetus. Do you have an effective method of birth  control? Please consult with your OB/GYN or  PCP for consultation. Alcohol  Alcohol is not recommended after bariatric  surgery. Alcohol contains calories but minimal  nutrition and will work against your weight  loss goal. For example, wine contains twice  the calories per ounce that regular soda does.   The absorption of alcohol changes with gastric  bypass and gastric sleeve because an enzyme  in the stomach which usually begins to digest  alcohol is absent or greatly reduced making  alcohol more potent. Alcohol may also be absorbed more quickly  into the body after gastric bypass or gastric  sleeve. The absorbed alcohol will be more  potent, and studies have demonstrated  that obesity surgery patients reach a higher  alcohol level and maintain the higher levels for  a longer period than others. In some patients  alcohol use can increase and lead to alcohol  dependence or addiction. For all of these  reasons, it is recommended to avoid alcohol  after bariatric surgery. Things to do before surgery:  ? Start the preoperative liquid diet on: _____________________________________________  ? Stop all NSAIDS (see page 30) and aspirin seven days before my surgery: _________________ .  Slade Auguste my doctor(PCP or surgeon) regarding stopping Coumadin, Plavix or  other blood thinners. ? Purchase bariatric clear liquids (Crystal Lite, sugar free Jell-O, broth, sugar free popsicles,  protein supplement) and bariatric soft and moist foods (low fat yogurt, cottage cheese, eggs,  tuna, fish, chicken) so that I?m prepared when I get home from the hospital.  ? Purchase all vitamins that will be required following surgery. o Chewable multivitamin -- two per day (ex: Flint[place] Complete). o Calcium Citrate -- 1,500 milligrams (500 milligrams, three times a day). o Vitamin B-12 -- 1,000 micrograms daily. o Vitamin D3 -- 5,000 IU daily. ? Create a system to keep track of how many ounces of fluid I am drinking daily  o Postoperative GOAL = minimum of 64 ounces per day. ? Purchase a protein supplement that I like.  o Brand: _ _________________________________________________________________ .  o Ounces: _________________________________________________________________ .  Dania Mccabe of protein per serving: _________________________________________________ .   Before [address]  ? [place] your teeth -- upon awakening, you may brush your teeth and rinse with water, but do not  swallow the water. ? Take medication -- take only the medications as instructed by your provider with a small sip of  water as soon as you get up. ? Wear proper clothing that is loose-fitting and easily removed. ? Avoid back zippers and pantyhose. ? Please remove ALL jewelry (leave ALL jewelry and valuables at home). ? Avoid using perfumes, deodorant, shaving creams or any scented lotions. ? Bring a case with your name on it to hold your eyeglasses, contact lenses, hearing aids  and dentures. If you do not see your providers clean their hands, please ask them to do so.  ? Family and friends who visit you should not touch the surgical wound or dressings. ? Family and friends should clean their hands with soap and water or an alcohol-based hand  rub before and after visiting you. If you do not see them clean their hands, ask them to clean  their hands. ? Make sure you understand how to care for your incision before you leave the hospital.  ? Always clean your hands before and after caring for your incision. ? Make sure you know who to contact if you have questions or problems after you get home. ? If you have any symptoms of an infection, such as redness and pain at the surgery site, drainage,  or fever, call your doctor immediately. ? Ask your nursing staff to help you out of bed to walk within five hours of arriving at your  hospital room. Getting out of bed to walk helps to decrease complications, such as blood clots  and pneumonia. Length of Stay  You are many types of pain control methods that are available to control discomfort. Effective  pain control will allow you to be up and walking shortly after surgery. Your doctor will choose  the method that is right for you. Regardless of the method of pain medication being used, it is  important for you to communicate with your nurse if the pain medication is not enough.  Call your  nurse for pain medication when the pain is moderate instead of waiting for when pain is severe. What type of pain should I expect? ? Abdominal pain  ? Rib pain  ? Shoulder pain  ? Sarika Teresa feeling in the center of your chest  Nausea:  Nausea following bariatric surgery is very  common. Causes include:  ? Anesthesia  ? Drinking too fastYou will be allowed 1 to 2 ounces of ice chips per hour when you are admitted to the floor. They are solely for your comfort. They are not required. ? You will be expected to walk the night of your surgery. Please ask your nurse or nursing assistant  for help the first time you walk. Please do not walk if you still feel groggy or unsteady on your feet. ? Your pain will be controlled with oral and IV pain medication on the day of surgery. ? In order to prevent pneumonia after surgery you please use your incentive spirometer (ICS)  at least 10 times per hour (see below). ? Pain medication  ? Surgery itself  I understand the serious potential complications include: deep venous thrombosis/pulmonary  embolism (blood clots in the legs and or lungs); gastrointestinal leak (leakage of digestive contents  into the abdomen); sepsis (serious infection); injury to adjacent organs such as esophagus, spleen,  pancreas, liver, diaphragm (requiring intervention or surgical removal); excessive bleeding; bowel  obstruction (blockage of the intestines); or organ failure. I am informed that these complications  can be life threatening. The overall mortality rate (risk of death) from bariatric surgery is close to  0.1 percent (1/1,000), but can be as high as 0.5 percent (1/200) for some patients. Patient initials: ______________  I understand that complications requiring re-operation may occur, either immediately after initial  surgery, or later in the recovery process.   Patient initials: ______________  Other potential complications which I may have include: wound infections or seromas  (fluid collection under skin); hernias (breakdown of tissue holding in abdominal contents);  gastritis or stomach ulcer (inflammation of the stomach); formation of gallstones; formation  of kidney stones or urinary tract infection; or pneumonia. Device related complications such as  foreign body reaction, band slippage, or erosion may occur with the adjustable gastric bandIwill  Pre-op instructions:  1. Shower with the antibacterial soap  the 3 days prior to surgery. 2. Pre-admission testing will contact you 1 week before surgery  3. [facility] will contact you the day before surgery to confirm your surgery time.  Email or call your surgery scheduler if you have not heard from them by 3pm  4. Nothing to eat or drink (NPO) after midnight the night before surgery.  Take any evening medications by 11pm  5. Wear comfortable clothing. 6. Do not bring any valuables. 7. Bring insurance card, prescription card, photo ID and credit card to the hospital.  **Discuss all home medications with your surgeon at you pre-op appointment. Your surgeon will inform you of what medications to stop before surgery and what medications to take the day of surgery. **STOP all NSAIDS (Ibuprofen, Aleve, Advil, Naprosyn etc.) and Aspirin 7 days before your surgery      Important Information:  1. No lifting over 15 lbs. for 6 weeks post-op  2. No driving for 6-01 days after surgery - must be off pain medication. 3. No smoking EVER again! 4. You will NOT be able to take any Non-Steroidal Anti-inflammatories (NSAIDS), Aspirin or Steroids  a. Bypass/revision patients - EVER again. (Tylenol only)  b. Sleeve patients - 6 weeks after surgery  5. Pulse/temperature- twice daily for 2 weeks post-op   6. Avoid pregnancy for 18 months  7. Key Diet principles:  a. Vitamin/mineral supplements-[place] Complete, Calcium citrate, Vitamin D3, Vitamin B12  b. Protein supplements - 60-70 grams/day  c. Minimum 64 oz. fluid per day  d.        What to Expect in the Hospital:  Pre-op area:  o Emergency door take elevator 2nd floor  o Arrive 1.5 hours before surgery  o Lovenox (blood thinner)  o Incentive Spirometer  o SCDs  o Sign consent  o Anesthesia  Start IV    Operating Room:    o Gastric bypass: 2- 2 ½ hours  o Sleeve gastrectomy: 1-1 ½ hours  o Revision: 2-3 hours    PACU(Post Anesthesia Care Unit):  o Nasal cannula  o EKG monitor  o Blood pressure cuff  o Pulse Ox  o Forbes Catheter  o SCDs  o No family allowed  o Minimum one hour  There are many types of pain control methods that are available to control discomfort. Effective  pain control will allow you to be up and walking shortly after surgery. Your doctor will choose  the method that is right for you. Regardless of the method of pain medication being used, it is  important for you to communicate with your nurse if the pain medication is not enough. Call your  nurse for pain medication when the pain is moderate instead of waiting for when pain is severe. What type of pain should I expect? ? Abdominal pain  ? Rib pain  ? Shoulder pain  ? Francisco Bourdon feeling in the center of your chest  Nausea:  Nausea following bariatric surgery is very  common. Causes include:  ? Anesthesia  ? Drinking too fast  ? Pain medication  ? Surgery itself    Expectations on your Day of Surgery  ? You will be allowed 1 to 2 ounces of ice chips per hour when you are admitted to the floor. They are solely for your comfort. They are not required. ? You will be expected to walk the night of your surgery. Please ask your nurse or nursing assistant  for help the first time you walk. Please do not walk if you still feel groggy or unsteady on your feet. ? Your pain will be controlled with oral and IV pain medication on the day of surgery.   ? In order to prevent pneumonia after surgery you please use your incentive spirometer (ICS)  at least 10 times per hour (see below    2 Central (Day of Surgery):  o Private room  o 1-2 oz. ice chips per hour   o IV Dilaudid  or liquid pain medication as needed for pain  o Discontinue estrada catheter  o Walk within 4 hours  o One family member can spend the night (must 18 years or older)  o Cell phone/computers - OK    2 Central (Post-op Day 1/Post-op Day 2):  o 7am - Gastrograffin swallow study (X-ray) for:  o All sleeve gastrectomy patients  o All revision patients   o Discontinue -nasal cannula and heart rate monitor  o Start bariatric clear liquid diet - water, crystal light, broth, Jell-O and protein supplement  o Slowly increase to 3 oz. clear liquids and 1 oz. protein supplement per hour  o Oral pain medication as needed for pain - communicate with your nurse  o Goal:  48 to 64 ounces, clear liquid per day preferable water  o Discharge instructions/Lovenox self-injection instructions   o WALK & WATER    Post-op Goals:  1. Void within 8 hours of your catheter being removed  2. Pain is well controlled with oral pain medication  3. Nausea is under control/No vomiting  4. Tolerating 3 oz. of clear liquids and 1 oz. protein supplement per hour for 3 consecutive hours. Post-op Follow-up Labs will need to be completed 1-2 weeks BEFORE your 3 month, 6 month and yearly visits. These labs are required and your appointment will be rescheduled if they are not completed. My surgical procedure and post-operative hospital stay has been discussed with me and I have been given the opportunity to ask any questions I may have.     Patient Signature: ____________________________  Date: _____________________

## 2019-08-22 RX ORDER — SUCRALFATE 1 G/1
TABLET ORAL
COMMUNITY
End: 2019-12-12

## 2019-08-22 NOTE — PERIOP NOTES
PAT - SURGICAL PRE-ADMISSION INSTRUCTIONS    NAME:  Jamarcus Yip                                                          TODAY'S DATE:  8/22/2019    SURGERY DATE:  8/27/2019                                  SURGERY ARRIVAL TIME:   TBV    1. Do NOT eat or drink anything, including candy or gum, after MIDNIGHT on 08/26/2019 , unless you have specific instructions from your Surgeon or Anesthesia Provider to do so. 2. No smoking on the day of surgery. 3. No alcohol 24 hours prior to the day of surgery. 4. No recreational drugs for one week prior to the day of surgery. 5. Leave all valuables, including money/purse, at home. 6. Remove all jewelry, nail polish, makeup (including mascara); no lotions, powders, deodorant, or perfume/cologne/after shave. 7. Glasses/Contact lenses and Dentures may be worn to the hospital.  They will be removed prior to surgery. 8. Call your doctor if symptoms of a cold or illness develop within 24 ours prior to surgery. 9. AN ADULT MUST DRIVE YOU HOME AFTER OUTPATIENT SURGERY. 10. If you are having an OUTPATIENT procedure, please make arrangements for a responsible adult to be with you for 24 hours after your surgery. 11. If you are admitted to the hospital, you will be assigned to a bed after surgery is complete. Normally a family member will not be able to see you until you are in your assigned bed. 15. Family is encouraged to accompany you to the hospital.  We ask visitors in the treatment area to be limited to ONE person at a time to ensure patient privacy. EXCEPTIONS WILL BE MADE AS NEEDED. 15. Children under 12 are discouraged from entering the treatment area and need to be supervised by an adult when in the waiting room. Special Instructions:    HOLD oral diabetic medication on the MORNING OF surgery. , HOLD metformin/glucophage dose starting the EVENING BEFORE the day of surgery.     Patient Prep:    shower with anti-bacterial soap    These surgical instructions were reviewed with Andie Pace during the PAT  phone call. Directions: On the morning of surgery, please go to the [facility]. Enter the building from the [facility] entrance, 1st floor (next to the Emergency Room entrance). Take the elevator to the 2nd floor. Sign in at the Registration Desk.     If you have any questions and/or concerns, please do not hesitate to call:  (Prior to the day of surgery)  [facility] unit:  [phone]  (Day of surgery)  [facility] unit:  [phone]

## 2019-08-23 ENCOUNTER — ANESTHESIA EVENT (OUTPATIENT)
Dept: SURGERY | Age: 44
DRG: 403 | End: 2019-08-23
Payer: MEDICAID

## 2019-08-26 NOTE — ANESTHESIA PREPROCEDURE EVALUATION
Relevant Problems   No relevant active problems       Anesthetic History   No history of anesthetic complications            Review of Systems / Medical History  Patient summary reviewed, nursing notes reviewed and pertinent labs reviewed    Pulmonary  Within defined limits                 Neuro/Psych         Neuromuscular disease     Cardiovascular    Hypertension                   GI/Hepatic/Renal           Liver disease     Endo/Other    Diabetes    Morbid obesity and arthritis     Other Findings            Physical Exam    Airway  Mallampati: II    Neck ROM: normal range of motion   Mouth opening: Normal     Cardiovascular  Regular rate and rhythm,  S1 and S2 normal,  no murmur, click, rub, or gallop             Dental    Dentition: Poor dentition     Pulmonary  Breath sounds clear to auscultation               Abdominal  GI exam deferred       Other Findings            Anesthetic Plan    ASA: 3  Anesthesia type: general          Induction: Intravenous  Anesthetic plan and risks discussed with: Patient

## 2019-08-27 ENCOUNTER — ANESTHESIA (OUTPATIENT)
Dept: SURGERY | Age: 44
DRG: 403 | End: 2019-08-27
Payer: MEDICAID

## 2019-08-27 ENCOUNTER — HOSPITAL ENCOUNTER (INPATIENT)
Age: 44
LOS: 1 days | Discharge: HOME | DRG: 403 | End: 2019-08-28
Attending: SURGERY | Admitting: SURGERY
Payer: MEDICAID

## 2019-08-27 DIAGNOSIS — E66.01 MORBID OBESITY (HCC): Primary | ICD-10-CM

## 2019-08-27 LAB
CREAT SERPL-MCNC: 0.84 MG/DL (ref 0.6–1.3)
EST. AVERAGE GLUCOSE BLD GHB EST-MCNC: 166 MG/DL
GLUCOSE BLD STRIP.AUTO-MCNC: 103 MG/DL (ref 70–110)
GLUCOSE BLD STRIP.AUTO-MCNC: 116 MG/DL (ref 70–110)
GLUCOSE BLD STRIP.AUTO-MCNC: 193 MG/DL (ref 70–110)
GLUCOSE BLD STRIP.AUTO-MCNC: 202 MG/DL (ref 70–110)
HBA1C MFR BLD: 7.4 % (ref 4.2–5.6)
HCG UR QL: NEGATIVE

## 2019-08-27 PROCEDURE — 36415 COLL VENOUS BLD VENIPUNCTURE: CPT

## 2019-08-27 PROCEDURE — 74011000250 HC RX REV CODE- 250: Performed by: NURSE ANESTHETIST, CERTIFIED REGISTERED

## 2019-08-27 PROCEDURE — 83036 HEMOGLOBIN GLYCOSYLATED A1C: CPT

## 2019-08-27 PROCEDURE — 74011250637 HC RX REV CODE- 250/637: Performed by: SURGERY

## 2019-08-27 PROCEDURE — 77030035051: Performed by: SURGERY

## 2019-08-27 PROCEDURE — 77030002933 HC SUT MCRYL J&J -A: Performed by: SURGERY

## 2019-08-27 PROCEDURE — 76210000006 HC OR PH I REC 0.5 TO 1 HR: Performed by: SURGERY

## 2019-08-27 PROCEDURE — 77030031139 HC SUT VCRL2 J&J -A: Performed by: SURGERY

## 2019-08-27 PROCEDURE — 74011250636 HC RX REV CODE- 250/636

## 2019-08-27 PROCEDURE — 77030002996 HC SUT SLK J&J -A: Performed by: SURGERY

## 2019-08-27 PROCEDURE — 74011250636 HC RX REV CODE- 250/636: Performed by: NURSE ANESTHETIST, CERTIFIED REGISTERED

## 2019-08-27 PROCEDURE — 76060000035 HC ANESTHESIA 2 TO 2.5 HR: Performed by: SURGERY

## 2019-08-27 PROCEDURE — 77030032490 HC SLV COMPR SCD KNE COVD -B: Performed by: SURGERY

## 2019-08-27 PROCEDURE — 76010000131 HC OR TIME 2 TO 2.5 HR: Performed by: SURGERY

## 2019-08-27 PROCEDURE — 77030039266 HC ADH SKN EXOFIN S2SG -A: Performed by: SURGERY

## 2019-08-27 PROCEDURE — 74011636637 HC RX REV CODE- 636/637: Performed by: SURGERY

## 2019-08-27 PROCEDURE — 77030027876 HC STPLR ENDOSC FLX PWR J&J -G1: Performed by: SURGERY

## 2019-08-27 PROCEDURE — 77030009968 HC RELD STPLR ENDOSC J&J -D: Performed by: SURGERY

## 2019-08-27 PROCEDURE — 77030008683 HC TU ET CUF COVD -A: Performed by: ANESTHESIOLOGY

## 2019-08-27 PROCEDURE — 74011636637 HC RX REV CODE- 636/637: Performed by: NURSE ANESTHETIST, CERTIFIED REGISTERED

## 2019-08-27 PROCEDURE — 81025 URINE PREGNANCY TEST: CPT

## 2019-08-27 PROCEDURE — 77030005518 HC CATH URETH FOL 2W BARD -B: Performed by: SURGERY

## 2019-08-27 PROCEDURE — 74011000250 HC RX REV CODE- 250

## 2019-08-27 PROCEDURE — 77030008477 HC STYL SATN SLP COVD -A: Performed by: ANESTHESIOLOGY

## 2019-08-27 PROCEDURE — 0D164ZA BYPASS STOMACH TO JEJUNUM, PERCUTANEOUS ENDOSCOPIC APPROACH: ICD-10-PCS | Performed by: SURGERY

## 2019-08-27 PROCEDURE — 77030018831 HC SOL IRR H20 BAXT -A: Performed by: SURGERY

## 2019-08-27 PROCEDURE — 82962 GLUCOSE BLOOD TEST: CPT

## 2019-08-27 PROCEDURE — 82565 ASSAY OF CREATININE: CPT

## 2019-08-27 PROCEDURE — 65270000029 HC RM PRIVATE

## 2019-08-27 PROCEDURE — 77030013079 HC BLNKT BAIR HGGR 3M -A: Performed by: ANESTHESIOLOGY

## 2019-08-27 PROCEDURE — 77030036732 HC RELD STPLR VASC J&J -F: Performed by: SURGERY

## 2019-08-27 PROCEDURE — 74011000250 HC RX REV CODE- 250: Performed by: SURGERY

## 2019-08-27 PROCEDURE — 74011250636 HC RX REV CODE- 250/636: Performed by: SURGERY

## 2019-08-27 PROCEDURE — 77030008598 HC TRCR ENDOSC BLDLS J&J -B: Performed by: SURGERY

## 2019-08-27 PROCEDURE — 77030008603 HC TRCR ENDOSC EPATH J&J -C: Performed by: SURGERY

## 2019-08-27 PROCEDURE — 77030016151 HC PROTCTR LNS DFOG COVD -B: Performed by: SURGERY

## 2019-08-27 PROCEDURE — 77030027491 HC SHR ENDOSC COVD -B: Performed by: SURGERY

## 2019-08-27 PROCEDURE — 77030008437 HC REINF STRP REINF SEMGD WLGO -C: Performed by: SURGERY

## 2019-08-27 PROCEDURE — 77030033639 HC SHR ENDO COAG HARM 36 J&J -E: Performed by: SURGERY

## 2019-08-27 RX ORDER — SODIUM CHLORIDE, SODIUM LACTATE, POTASSIUM CHLORIDE, CALCIUM CHLORIDE 600; 310; 30; 20 MG/100ML; MG/100ML; MG/100ML; MG/100ML
150 INJECTION, SOLUTION INTRAVENOUS CONTINUOUS
Status: DISCONTINUED | OUTPATIENT
Start: 2019-08-27 | End: 2019-08-28 | Stop reason: HOSPADM

## 2019-08-27 RX ORDER — INSULIN LISPRO 100 [IU]/ML
INJECTION, SOLUTION INTRAVENOUS; SUBCUTANEOUS ONCE
Status: DISCONTINUED | OUTPATIENT
Start: 2019-08-27 | End: 2019-08-27 | Stop reason: HOSPADM

## 2019-08-27 RX ORDER — INSULIN LISPRO 100 [IU]/ML
INJECTION, SOLUTION INTRAVENOUS; SUBCUTANEOUS ONCE
Status: COMPLETED | OUTPATIENT
Start: 2019-08-27 | End: 2019-08-27

## 2019-08-27 RX ORDER — HYDROMORPHONE HYDROCHLORIDE 1 MG/ML
1 INJECTION, SOLUTION INTRAMUSCULAR; INTRAVENOUS; SUBCUTANEOUS
Status: DISCONTINUED | OUTPATIENT
Start: 2019-08-27 | End: 2019-08-28 | Stop reason: HOSPADM

## 2019-08-27 RX ORDER — LIDOCAINE HYDROCHLORIDE 20 MG/ML
INJECTION, SOLUTION EPIDURAL; INFILTRATION; INTRACAUDAL; PERINEURAL AS NEEDED
Status: DISCONTINUED | OUTPATIENT
Start: 2019-08-27 | End: 2019-08-27 | Stop reason: HOSPADM

## 2019-08-27 RX ORDER — ONDANSETRON 2 MG/ML
4 INJECTION INTRAMUSCULAR; INTRAVENOUS ONCE
Status: COMPLETED | OUTPATIENT
Start: 2019-08-27 | End: 2019-08-27

## 2019-08-27 RX ORDER — ONDANSETRON 2 MG/ML
4 INJECTION INTRAMUSCULAR; INTRAVENOUS
Status: DISCONTINUED | OUTPATIENT
Start: 2019-08-27 | End: 2019-08-28 | Stop reason: HOSPADM

## 2019-08-27 RX ORDER — LIDOCAINE HYDROCHLORIDE 10 MG/ML
0.1 INJECTION, SOLUTION EPIDURAL; INFILTRATION; INTRACAUDAL; PERINEURAL AS NEEDED
Status: DISCONTINUED | OUTPATIENT
Start: 2019-08-27 | End: 2019-08-27 | Stop reason: HOSPADM

## 2019-08-27 RX ORDER — OXYCODONE HYDROCHLORIDE 5 MG/1
5 TABLET ORAL
Status: DISCONTINUED | OUTPATIENT
Start: 2019-08-27 | End: 2019-08-28 | Stop reason: HOSPADM

## 2019-08-27 RX ORDER — ONDANSETRON HYDROCHLORIDE 4 MG/2ML
INJECTION, SOLUTION INTRAMUSCULAR; INTRAVENOUS AS NEEDED
Status: DISCONTINUED | OUTPATIENT
Start: 2019-08-27 | End: 2019-08-27 | Stop reason: HOSPADM

## 2019-08-27 RX ORDER — CLINDAMYCIN PHOSPHATE 900 MG/50ML
INJECTION INTRAVENOUS AS NEEDED
Status: DISCONTINUED | OUTPATIENT
Start: 2019-08-27 | End: 2019-08-27 | Stop reason: HOSPADM

## 2019-08-27 RX ORDER — ENOXAPARIN SODIUM 100 MG/ML
40 INJECTION SUBCUTANEOUS ONCE
Status: COMPLETED | OUTPATIENT
Start: 2019-08-27 | End: 2019-08-27

## 2019-08-27 RX ORDER — HYDROMORPHONE HYDROCHLORIDE 1 MG/ML
INJECTION, SOLUTION INTRAMUSCULAR; INTRAVENOUS; SUBCUTANEOUS
Status: COMPLETED
Start: 2019-08-27 | End: 2019-08-27

## 2019-08-27 RX ORDER — HYOSCYAMINE SULFATE 0.12 MG/1
0.12 TABLET, ORALLY DISINTEGRATING ORAL
Status: DISCONTINUED | OUTPATIENT
Start: 2019-08-27 | End: 2019-08-28 | Stop reason: HOSPADM

## 2019-08-27 RX ORDER — SUCCINYLCHOLINE CHLORIDE 100 MG/5ML
SYRINGE (ML) INTRAVENOUS AS NEEDED
Status: DISCONTINUED | OUTPATIENT
Start: 2019-08-27 | End: 2019-08-27 | Stop reason: HOSPADM

## 2019-08-27 RX ORDER — HYDROMORPHONE HYDROCHLORIDE 1 MG/ML
0.5 INJECTION, SOLUTION INTRAMUSCULAR; INTRAVENOUS; SUBCUTANEOUS
Status: DISCONTINUED | OUTPATIENT
Start: 2019-08-27 | End: 2019-08-27 | Stop reason: HOSPADM

## 2019-08-27 RX ORDER — HYDROMORPHONE HYDROCHLORIDE 2 MG/ML
0.5 INJECTION, SOLUTION INTRAMUSCULAR; INTRAVENOUS; SUBCUTANEOUS
Status: DISCONTINUED | OUTPATIENT
Start: 2019-08-27 | End: 2019-08-27

## 2019-08-27 RX ORDER — NALOXONE HYDROCHLORIDE 0.4 MG/ML
0.4 INJECTION, SOLUTION INTRAMUSCULAR; INTRAVENOUS; SUBCUTANEOUS AS NEEDED
Status: DISCONTINUED | OUTPATIENT
Start: 2019-08-27 | End: 2019-08-28 | Stop reason: HOSPADM

## 2019-08-27 RX ORDER — MAGNESIUM SULFATE 100 %
4 CRYSTALS MISCELLANEOUS AS NEEDED
Status: DISCONTINUED | OUTPATIENT
Start: 2019-08-27 | End: 2019-08-28 | Stop reason: HOSPADM

## 2019-08-27 RX ORDER — MIDAZOLAM HYDROCHLORIDE 1 MG/ML
INJECTION, SOLUTION INTRAMUSCULAR; INTRAVENOUS AS NEEDED
Status: DISCONTINUED | OUTPATIENT
Start: 2019-08-27 | End: 2019-08-27 | Stop reason: HOSPADM

## 2019-08-27 RX ORDER — CLINDAMYCIN PHOSPHATE 900 MG/50ML
900 INJECTION INTRAVENOUS ONCE
Status: DISCONTINUED | OUTPATIENT
Start: 2019-08-27 | End: 2019-08-27 | Stop reason: HOSPADM

## 2019-08-27 RX ORDER — SODIUM CHLORIDE, SODIUM LACTATE, POTASSIUM CHLORIDE, CALCIUM CHLORIDE 600; 310; 30; 20 MG/100ML; MG/100ML; MG/100ML; MG/100ML
25 INJECTION, SOLUTION INTRAVENOUS CONTINUOUS
Status: DISCONTINUED | OUTPATIENT
Start: 2019-08-27 | End: 2019-08-27 | Stop reason: HOSPADM

## 2019-08-27 RX ORDER — KETOROLAC TROMETHAMINE 30 MG/ML
15 INJECTION, SOLUTION INTRAMUSCULAR; INTRAVENOUS EVERY 6 HOURS
Status: COMPLETED | OUTPATIENT
Start: 2019-08-27 | End: 2019-08-28

## 2019-08-27 RX ORDER — SODIUM CHLORIDE, SODIUM LACTATE, POTASSIUM CHLORIDE, CALCIUM CHLORIDE 600; 310; 30; 20 MG/100ML; MG/100ML; MG/100ML; MG/100ML
125 INJECTION, SOLUTION INTRAVENOUS CONTINUOUS
Status: DISCONTINUED | OUTPATIENT
Start: 2019-08-27 | End: 2019-08-27 | Stop reason: HOSPADM

## 2019-08-27 RX ORDER — MAGNESIUM SULFATE 100 %
4 CRYSTALS MISCELLANEOUS AS NEEDED
Status: DISCONTINUED | OUTPATIENT
Start: 2019-08-27 | End: 2019-08-27 | Stop reason: HOSPADM

## 2019-08-27 RX ORDER — INSULIN LISPRO 100 [IU]/ML
INJECTION, SOLUTION INTRAVENOUS; SUBCUTANEOUS
Status: DISCONTINUED | OUTPATIENT
Start: 2019-08-27 | End: 2019-08-28 | Stop reason: HOSPADM

## 2019-08-27 RX ORDER — HYOSCYAMINE SULFATE 0.12 MG/1
0.12 TABLET SUBLINGUAL
Status: DISCONTINUED | OUTPATIENT
Start: 2019-08-27 | End: 2019-08-27

## 2019-08-27 RX ORDER — DIPHENHYDRAMINE HYDROCHLORIDE 50 MG/ML
25 INJECTION, SOLUTION INTRAMUSCULAR; INTRAVENOUS
Status: DISCONTINUED | OUTPATIENT
Start: 2019-08-27 | End: 2019-08-28 | Stop reason: HOSPADM

## 2019-08-27 RX ORDER — ACETAMINOPHEN 650 MG/1
650 SUPPOSITORY RECTAL ONCE
Status: COMPLETED | OUTPATIENT
Start: 2019-08-27 | End: 2019-08-27

## 2019-08-27 RX ORDER — HYDROMORPHONE HYDROCHLORIDE 1 MG/ML
INJECTION, SOLUTION INTRAMUSCULAR; INTRAVENOUS; SUBCUTANEOUS AS NEEDED
Status: DISCONTINUED | OUTPATIENT
Start: 2019-08-27 | End: 2019-08-27 | Stop reason: HOSPADM

## 2019-08-27 RX ORDER — FENTANYL CITRATE 50 UG/ML
INJECTION, SOLUTION INTRAMUSCULAR; INTRAVENOUS AS NEEDED
Status: DISCONTINUED | OUTPATIENT
Start: 2019-08-27 | End: 2019-08-27 | Stop reason: HOSPADM

## 2019-08-27 RX ORDER — FENTANYL CITRATE 50 UG/ML
50 INJECTION, SOLUTION INTRAMUSCULAR; INTRAVENOUS AS NEEDED
Status: DISCONTINUED | OUTPATIENT
Start: 2019-08-27 | End: 2019-08-27 | Stop reason: HOSPADM

## 2019-08-27 RX ORDER — PROPOFOL 10 MG/ML
INJECTION, EMULSION INTRAVENOUS AS NEEDED
Status: DISCONTINUED | OUTPATIENT
Start: 2019-08-27 | End: 2019-08-27 | Stop reason: HOSPADM

## 2019-08-27 RX ORDER — VECURONIUM BROMIDE FOR INJECTION 1 MG/ML
INJECTION, POWDER, LYOPHILIZED, FOR SOLUTION INTRAVENOUS AS NEEDED
Status: DISCONTINUED | OUTPATIENT
Start: 2019-08-27 | End: 2019-08-27 | Stop reason: HOSPADM

## 2019-08-27 RX ORDER — ENOXAPARIN SODIUM 100 MG/ML
40 INJECTION SUBCUTANEOUS DAILY
Status: DISCONTINUED | OUTPATIENT
Start: 2019-08-28 | End: 2019-08-28 | Stop reason: HOSPADM

## 2019-08-27 RX ORDER — DEXTROSE MONOHYDRATE 100 MG/ML
125-250 INJECTION, SOLUTION INTRAVENOUS AS NEEDED
Status: DISCONTINUED | OUTPATIENT
Start: 2019-08-27 | End: 2019-08-28 | Stop reason: HOSPADM

## 2019-08-27 RX ORDER — ACETAMINOPHEN 325 MG/1
650 TABLET ORAL EVERY 6 HOURS
Status: DISCONTINUED | OUTPATIENT
Start: 2019-08-27 | End: 2019-08-28 | Stop reason: HOSPADM

## 2019-08-27 RX ADMIN — HYDROMORPHONE HYDROCHLORIDE 1 MG: 1 INJECTION, SOLUTION INTRAMUSCULAR; INTRAVENOUS; SUBCUTANEOUS at 08:03

## 2019-08-27 RX ADMIN — KETOROLAC TROMETHAMINE 15 MG: 30 INJECTION, SOLUTION INTRAMUSCULAR at 17:09

## 2019-08-27 RX ADMIN — CLINDAMYCIN PHOSPHATE 900 MG: 900 INJECTION INTRAVENOUS at 07:40

## 2019-08-27 RX ADMIN — ACETAMINOPHEN 650 MG: 325 TABLET, FILM COATED ORAL at 23:14

## 2019-08-27 RX ADMIN — Medication 100 MG: at 07:41

## 2019-08-27 RX ADMIN — SODIUM CHLORIDE, SODIUM LACTATE, POTASSIUM CHLORIDE, AND CALCIUM CHLORIDE: 600; 310; 30; 20 INJECTION, SOLUTION INTRAVENOUS at 08:03

## 2019-08-27 RX ADMIN — ONDANSETRON HYDROCHLORIDE 4 MG: 4 INJECTION, SOLUTION INTRAMUSCULAR; INTRAVENOUS at 08:59

## 2019-08-27 RX ADMIN — ONDANSETRON 4 MG: 2 INJECTION INTRAMUSCULAR; INTRAVENOUS at 11:45

## 2019-08-27 RX ADMIN — VECURONIUM BROMIDE FOR INJECTION 5 MG: 1 INJECTION, POWDER, LYOPHILIZED, FOR SOLUTION INTRAVENOUS at 07:47

## 2019-08-27 RX ADMIN — SODIUM CHLORIDE, SODIUM LACTATE, POTASSIUM CHLORIDE, AND CALCIUM CHLORIDE: 600; 310; 30; 20 INJECTION, SOLUTION INTRAVENOUS at 08:49

## 2019-08-27 RX ADMIN — KETOROLAC TROMETHAMINE 15 MG: 30 INJECTION, SOLUTION INTRAMUSCULAR at 12:40

## 2019-08-27 RX ADMIN — INSULIN LISPRO 6 UNITS: 100 INJECTION, SOLUTION INTRAVENOUS; SUBCUTANEOUS at 10:12

## 2019-08-27 RX ADMIN — PROPOFOL 200 MG: 10 INJECTION, EMULSION INTRAVENOUS at 07:40

## 2019-08-27 RX ADMIN — ACETAMINOPHEN 650 MG: 325 TABLET, FILM COATED ORAL at 11:45

## 2019-08-27 RX ADMIN — SODIUM CHLORIDE, SODIUM LACTATE, POTASSIUM CHLORIDE, AND CALCIUM CHLORIDE 150 ML/HR: 600; 310; 30; 20 INJECTION, SOLUTION INTRAVENOUS at 11:03

## 2019-08-27 RX ADMIN — MIDAZOLAM HYDROCHLORIDE 2 MG: 1 INJECTION, SOLUTION INTRAMUSCULAR; INTRAVENOUS at 07:28

## 2019-08-27 RX ADMIN — KETOROLAC TROMETHAMINE 15 MG: 30 INJECTION, SOLUTION INTRAMUSCULAR at 23:14

## 2019-08-27 RX ADMIN — INSULIN LISPRO 2 UNITS: 100 INJECTION, SOLUTION INTRAVENOUS; SUBCUTANEOUS at 17:09

## 2019-08-27 RX ADMIN — HYDROMORPHONE HYDROCHLORIDE 0.5 MG: 1 INJECTION, SOLUTION INTRAMUSCULAR; INTRAVENOUS; SUBCUTANEOUS at 10:17

## 2019-08-27 RX ADMIN — SODIUM CHLORIDE, SODIUM LACTATE, POTASSIUM CHLORIDE, AND CALCIUM CHLORIDE 25 ML/HR: 600; 310; 30; 20 INJECTION, SOLUTION INTRAVENOUS at 06:38

## 2019-08-27 RX ADMIN — FAMOTIDINE 20 MG: 10 INJECTION, SOLUTION INTRAVENOUS at 06:47

## 2019-08-27 RX ADMIN — FENTANYL CITRATE 100 MCG: 50 INJECTION, SOLUTION INTRAMUSCULAR; INTRAVENOUS at 07:40

## 2019-08-27 RX ADMIN — ONDANSETRON 4 MG: 2 INJECTION INTRAMUSCULAR; INTRAVENOUS at 10:28

## 2019-08-27 RX ADMIN — SODIUM CHLORIDE, SODIUM LACTATE, POTASSIUM CHLORIDE, AND CALCIUM CHLORIDE 150 ML/HR: 600; 310; 30; 20 INJECTION, SOLUTION INTRAVENOUS at 23:19

## 2019-08-27 RX ADMIN — LIDOCAINE HYDROCHLORIDE 100 MG: 20 INJECTION, SOLUTION EPIDURAL; INFILTRATION; INTRACAUDAL; PERINEURAL at 07:40

## 2019-08-27 RX ADMIN — ACETAMINOPHEN 650 MG: 325 TABLET, FILM COATED ORAL at 17:09

## 2019-08-27 RX ADMIN — ENOXAPARIN SODIUM 40 MG: 40 INJECTION SUBCUTANEOUS at 06:46

## 2019-08-27 RX ADMIN — SODIUM CHLORIDE, SODIUM LACTATE, POTASSIUM CHLORIDE, AND CALCIUM CHLORIDE 150 ML/HR: 600; 310; 30; 20 INJECTION, SOLUTION INTRAVENOUS at 17:15

## 2019-08-27 RX ADMIN — OXYCODONE HYDROCHLORIDE 5 MG: 5 TABLET ORAL at 15:04

## 2019-08-27 RX ADMIN — ONDANSETRON 4 MG: 2 INJECTION INTRAMUSCULAR; INTRAVENOUS at 20:57

## 2019-08-27 NOTE — PROGRESS NOTES
1130  Received report from Lolita Hong RN. Patient in bed family member present. Patient has 5 lap sites to abdomen c/d/i and well approximated. Patient shows no signs of distress at this time. Will continue to measure. 1140 Patient complaint of nausea, zofran given.    1950. Bedside and Verbal shift change report given to Brittney Rincon (oncoming nurse) by this nurse Lori Cagle (offgoing nurse). Report included the following information SBAR, Kardex and MAR.

## 2019-08-27 NOTE — PERIOP NOTES
2232 Received pt. Connected pt to monitor. VSS. Assessment preformed. RN at bedside. Will continue to monitor. [facility] to waiting room, spoke to [address] [place] [address] - pt friend - pt id number verified. Update given on pt status. 105 TRANSFER - OUT REPORT:    Verbal report given to Henok Chan (name) on Mason Vaz  being transferred to 2200(unit) for routine progression of care    Report consisted of patients Situation, Background, Assessment and   Recommendations(SBAR). Information from the following report(s) SBAR, OR Summary, Intake/Output and MAR was reviewed with the receiving nurse. Lines:   Peripheral IV 08/27/19 Anterior;Right Forearm (Active)   Site Assessment Clean, dry, & intact 8/27/2019 10:35 AM   Phlebitis Assessment 0 8/27/2019 10:35 AM   Infiltration Assessment 0 8/27/2019 10:35 AM   Dressing Status Clean, dry, & intact 8/27/2019 10:35 AM   Dressing Type Tape;Transparent 8/27/2019 10:35 AM   Hub Color/Line Status Pink; Infusing 8/27/2019 10:35 AM   Action Taken Open ports on tubing capped 8/27/2019 10:35 AM        Opportunity for questions and clarification was provided.       Patient transported with:   Registered Nurse

## 2019-08-27 NOTE — DIABETES MGMT
Glycemic Control Plan of Care    Admitted today s/p LAPAROSCOPIC star-en-y GASTRIC BYPASS. Friend at bedside. Confirmed with patient she was taking glimepiride 4 mg daily and lantus 60 units daily. Experiencing some nausea but no vomiting - reviewed our insulin protocol with POC 4 times daily. She has a meter and supplies at home and instructed to continue to monitor BG at discharge. No recent A1c on file - one pending. Last A1c on 2/13/19 was 10.7.   Will f/u tomorrow        Taylor Robledo MPH RN  Pager [phone]  Office [phone]

## 2019-08-27 NOTE — PROGRESS NOTES
Problem: Discharge Planning  Goal: *Discharge to safe environment  Outcome: Progressing Towards Goal  Plan is home     Reason for Admission: Morbid Obesity                     RRAT Score:     9                Plan for utilizing home health:    none                      Current Advanced Directive/Advance Care Plan: no                         Transition of Care Plan:     Home      Patient has designated ____friend____________________ to participate in his/her discharge plan and to receive any needed information. Name: Maia Adams  Address:  Phone number:      [phone]    Care Management Interventions  PCP Verified by CM: Yes(seen preop visit)  Palliative Care Criteria Met (RRAT>21 & CHF Dx)?: No  Mode of Transport at Discharge: Other (see comment)(Friend Mayte)  Transition of Care Consult (CM Consult): Discharge Planning  MyChart Signup: No  Discharge Durable Medical Equipment: No  Physical Therapy Consult: No  Occupational Therapy Consult: No  Speech Therapy Consult: No  Current Support Network: Other(Lives with Amena Rojo, 2 sons, god daughter and grandson)  Confirm Follow Up Transport: Friends  Plan discussed with Pt/Family/Caregiver: Yes  Discharge Location  Discharge Placement: Home     Chart reviewed and pt verified demographics. She gave permission for me to talk to her friend Amena Rojo  - emergency contact in the room since pt was so tired. She has [facility]. Dr Dany Dupont is PCP- seen post op visit. She lives in a one level home and she is independent with ADL. She uses no DME. Her Transition Plan is to go [facility].  JESUS Lam  [facility]  [phone], Pager [phone]  [email or web address]

## 2019-08-27 NOTE — PERIOP NOTES
Pre-Op Summary    Pt arrived via car with family/friend and is oriented to time, place, person and situation. Patient with steady gait with none assistive devices. Visit Vitals  BP (P) 127/75   Pulse (P) 71   Temp (P) 98.5 °F (36.9 °C)   Resp (P) 16   LMP 06/27/2019   SpO2 (P) 100%       Peripheral IV located on Right forearm. Patients belongings are located [facility]. Patient's point of contact is [place] and their contact number is: [phone]. They will be in the waiting room. They are able to receive medication information. They will be admitted.

## 2019-08-27 NOTE — ANESTHESIA POSTPROCEDURE EVALUATION
Procedure(s):  LAPAROSCOPIC star-en-y GASTRIC BYPASS.     general    Anesthesia Post Evaluation      Multimodal analgesia: multimodal analgesia used between 6 hours prior to anesthesia start to PACU discharge  Patient location during evaluation: bedside  Patient participation: complete - patient participated  Level of consciousness: awake and alert  Pain management: adequate  Airway patency: patent  Anesthetic complications: no  Cardiovascular status: hemodynamically stable  Respiratory status: spontaneous ventilation  Hydration status: acceptable  Post anesthesia nausea and vomiting:  none      Vitals Value Taken Time   /67 8/27/2019 10:50 AM   Temp 36.6 °C (97.9 °F) 8/27/2019  9:57 AM   Pulse 90 8/27/2019 10:55 AM   Resp 24 8/27/2019 10:55 AM   SpO2 95 % 8/27/2019 10:55 AM

## 2019-08-27 NOTE — OP NOTES
DATE: 8/27/2019  [facility] HAS DEMANDED TO CLARIFY WHETHER INTERNS OR RESIDENTS ARE AVAILABLE FOR ASSISTING FOR ANY CASE SUBMITTED TO THEM FOR PAYMENT. TO CLARIFY, I DO NOT TEACH RESIDENTS NOR INTERNS. THEY ARE NOT NOW, IN THE PAST NOR AT ANY ANTICIPATED POINT IN THE FUTURE, AVAILABLE TO PROVIDE ASSISTANCE IN THE OPERATING ROOM FOR SURGICAL CASES THAT I PERFORM. PREOPERATIVE DIAGNOSIS: Clinically severe obesity, body mass index of 42,   comorbidities of diabetes and hypertension . POSTOPERATIVE DIAGNOSIS: Clinically severe obesity, body mass index of 42,   comorbidities of diabetes and hypertension   PROCEDURES: Laparoscopic Justin-en-Y gastric bypass with 796 cm retrocolic   retrogastric Justin limb, 40 cm biliopancreatic limb, 15 ml    tubularized gastric pouch applied to the lesser curvature of stomach  SURGEON: Dr. Juan M Huerta. Yenny Lujan MD, FACS   ASSISTANT: Caprice Torres SA  ANESTHESIA: General endotracheal anesthesia. Local anesthetic mixture 1%   lidocaine, 0.5% Marcaine, with epinephrine injected locally utilizing 50  mL. FINDINGS: None  SPECIMEN: None  IMPLANTS: * No implants in log *  ESTIMATED BLOOD LOSS: 25 ml  FLUIDS: 2500 ml   URINE OUTPUT: 75 ml   DRAIN: None  COMPLICATIONS: None  OPERATIVE START TIME: 4873  OPERATIVE COMPLETION TIME: 0946    DESCRIPTION OF PROCEDURE: The patient was prepped and draped in standard sterile fashion after being placed under general anesthetic. A site was selected superior to the umbilicus in the midline. This area was incised. A 5  mm Optical trocar was placed over the laparoscope and directed into the abdominal cavity using Optiview technique. Abdomen was insufflated to 15 mmHg and surveyed. There was no evidence of injury from the entry. Additional trocars were then placed.  My assistant place one 5 mm trocar was placed in the anterior axillary line left upper quadrant approximately 3 fingerbreadths below the costal margin and another 12 mm trocar was placed in the lateral portion of the left rectus sheath approximately 3 fingerbreadths lateral to the umbilical trocar. I then placed another 12 mm trocar was placed approximately 4 fingerbreadths lateral to the umbilical trocar in the  lateral portion of right rectus sheath. All were placed after incising with scalpel, all were placed using blunt dissecting technique. There was no evidence of injury from the entries. Instrument were placed in the abdominal cavity. The omentum and transverse colon were retracted superiorly, exposing ligament of Treitz. The small bowel was measured 40 cm distal to the ligament of Treitz, divided at this level with A 60 mm [place] stapler. Harmonic dissection was used to continue the division to the base of the mesentery, confirming preservation of blood flow to the small bowel above and below the staple line prior to firing. Then, from the distal staple line, the Justin limb was measured 150 cm distal and an antimesenteric enterotomy was made. Another antimesenteric enterotomy was made just proximal to the proximal staple line of the biliopancreatic limb. Through these enterotomies, a 60 mm [place] stapler was placed into the bowel, clamped on the antimesenteric borders and fired to form a stapled side-to-side anastomosis. The remaining enterotomy was closed in a running inverting fashion with 3-0 Vicryl suture. The mesenteric defect was then closed with running 2-0 silk suture, extending on the bowel wall in a running Lembert fashion for second layer closure of the enterotomy. At this point, attention was directed to the transverse mesocolon. While my assistant exposed the bare area of the transverse mesentery above the ligament of Trietz. A site was selected just anterior to the ligament of Treitz. This area was incised, entering the lesser sac.  The Justin limb was then passed through the fenestration of the transverse mesocolon and into the lesser sac taking care not to twist on its mesentery. At this point, the omentum and transverse colon were retracted inferiorly. The greater curvature of the stomach was then grasped and the gastrocolic ligament was retracted by my assistant and I then  dissected the ligament directly off of the wall of the stomach using the Harmonic scalpel to widely open the lesser sac. Adhesions between the posterior wall of the stomach and the retroperitoneum were taken down under direct vision to fully free the lesser sac. At this point, an incision was made near the xiphoid. Through this incision, a [place] retractor was placed through the abdominal wall beneath the left lateral segment of the liver and connected to a bedside retraction system allowing exposure of the esophageal hiatus. While my assistant retracted the stomach and lesser omentum inferiorly, I dissected the angle of His  anterior to posterior down the left fabian of the diaphragm using Harmonic scalpel to assist in eventual division of the gastric pouch and distal stomach remnant. The lesser curvature of the stomach was then examined. A site was selected just proximal to the crow's foot of the vagus nerve in this area. The gastrohepatic ligament was dissected away from the lesser curvature of the stomach directly on the wall of the stomach to enter the lesser sac. This fenestration was then widened using Bovie cautery over a 10 mm articulating esophageal retractor which had been placed through the lesser sac and brought through the fenestration. Then, a 60 mm [place] stapler was placed transversely across the stomach through this fenestration, clamped and then fired to begin the formation of the gastric pouch. Another stapler was placed near the crotch of the previous staple line and directed superiorly toward the angle of His, and clamped.  Prior to firing, a 34-[place] orogastric Benigno tube was brought through the esophagus into the stomach so its tip was against the transverse staple line, its course lying along the lesser curvature of stomach. The stapler was snugged against it and then fired. Then, a 60 mm [place] stapler with Seam Guard reinforcement was placed in the crotch of the previous staple line and clamped and then fired from the crotch of previous staple line directed superiorly toward the angle of His until the gastric pouch was fully divided from the distal stomach remnant. Once fully divided, the staple lines were examined, noted to be hemostatic and viable. The lesser omentum was placed between the staple lines to prevent gastro-gastric fistulization. The tip of the gastric pouch placed in the lesser sac and the distal stomach remnant was retracted anteriorly to allow exposure of the lesser sac. The Justin limb was examined. The proximal 6 cm were noted to be tethered by its mesentery. This proximal segment was elevated by my assistant and then I excised from the mesentery using a Harmonic scalpel and then one additional staple load was used to divide the devascularized segment from the remainder of the Justin limb. This segment was brought out of the abdominal cavity via one of the trocar sites, passed off the field and discarded. The justin limb mesentery was examined to confirm there was no twisting of the mesentery prior to sewing the anastomosis    At this point, the gastrojejunostomy was begun by sewing the right antimesenteric border of the Justin limb to the distal portion of the gastric pouch using running suture of 3-0 Vicryl. An anterior gastrotomy and antimesenteric enterotomy were made. From the left apex of these 2 enterotomies, a 3-0 Vicryl suture was placed and run on the posterior surface in a running inverting fashion to the right apex, transitioned on the anterior surface and sewn approximately half way across the opening in an inverting fashion.  Then, another 3-0 Vicryl suture was placed at the left apex and sewn to the previous suture in a running inverting fashion on the anterior surface. Prior to tying these sutures, the Benigno tube was brought across the anastomosis, then the sutures were tied, completing the inner layer. Then, from the left apex another 3-0 Vicryl suture was placed and run to the right apex in a running Lembert fashion completing the anterior outer layer os the anastomosis, thereby completing the anastomosis. Once this was complete, the Benigno tube was removed from the patient. Then, working from within the lesser sac, the Star limb was sewn to the right anterior surface of the transverse mesocolic defect with  3 interrupted sutures of 2-0 silk. Then, the left side of the star limb mesentery was closed to the left side of the tranverse mesocolic defect with a running suture of 2-0 silk while my assistant exposed the defects from within the lesser sac. The omentum and transverse colon were retracted superiorly. The base of the left side of the transverse mesocolic defect was exposed by my assistant and this was sewn to the base of the left side of the Star limb mesentery with a pursestring suture of 2-0 silk. The Star limb was then retracted to the left. The right-side of the  transverse mesocolic defect was exposed by my assistant and closed to the right side of the Star limb mesentery with another pursestring suture of 2-0 silk, then one additional interrupted suture was placed between the right lateral surface of the Star limb and the right lateral surface of the transverse mesocolic defect, completing the closure of the defect around the Star limb. Once this was complete, both mesenteric defects were examined and noted to be well closed. Both anastomoses were examined and noted to be hemostatic and viable without evidence of leak. The omentum and transverse colon were retracted inferiorly back to normal position. The gastric remnant was placed over the anastomosis, returning the anastomosis into the lesser sac.  The [facility] was removed. At this point, the abdomen was desufflated via the remaining trocars. All trocars were then removed. The trocar sites were rendered hemostatic with Bovie cautery and then closed by my assistant with interrupted 4-0 Monocryl deep dermal sutures. Dermabond was applied as wound dressings. The patient tolerated the procedure well.

## 2019-08-27 NOTE — INTERVAL H&P NOTE
H&P Update:  Elian Elliott was seen and examined. History and physical has been reviewed. The patient has been examined.  There have been no significant clinical changes since the completion of the originally dated History and Physical.

## 2019-08-28 VITALS
HEART RATE: 77 BPM | RESPIRATION RATE: 18 BRPM | BODY MASS INDEX: 40.07 KG/M2 | OXYGEN SATURATION: 100 % | DIASTOLIC BLOOD PRESSURE: 88 MMHG | WEIGHT: 270.5 LBS | HEIGHT: 69 IN | SYSTOLIC BLOOD PRESSURE: 157 MMHG | TEMPERATURE: 98.6 F

## 2019-08-28 LAB
ANION GAP SERPL CALC-SCNC: 6 MMOL/L (ref 3–18)
BUN SERPL-MCNC: 7 MG/DL (ref 7–18)
BUN/CREAT SERPL: 9 (ref 12–20)
CALCIUM SERPL-MCNC: 9.2 MG/DL (ref 8.5–10.1)
CHLORIDE SERPL-SCNC: 106 MMOL/L (ref 100–111)
CO2 SERPL-SCNC: 25 MMOL/L (ref 21–32)
CREAT SERPL-MCNC: 0.8 MG/DL (ref 0.6–1.3)
ERYTHROCYTE [DISTWIDTH] IN BLOOD BY AUTOMATED COUNT: 15.6 % (ref 11.6–14.5)
GLUCOSE BLD STRIP.AUTO-MCNC: 115 MG/DL (ref 70–110)
GLUCOSE SERPL-MCNC: 94 MG/DL (ref 74–99)
HCT VFR BLD AUTO: 31 % (ref 35–45)
HGB BLD-MCNC: 9.5 G/DL (ref 12–16)
MCH RBC QN AUTO: 23.6 PG (ref 24–34)
MCHC RBC AUTO-ENTMCNC: 30.6 G/DL (ref 31–37)
MCV RBC AUTO: 77.1 FL (ref 74–97)
PLATELET # BLD AUTO: 379 K/UL (ref 135–420)
PMV BLD AUTO: 9.2 FL (ref 9.2–11.8)
POTASSIUM SERPL-SCNC: 4.2 MMOL/L (ref 3.5–5.5)
RBC # BLD AUTO: 4.02 M/UL (ref 4.2–5.3)
SODIUM SERPL-SCNC: 137 MMOL/L (ref 136–145)
WBC # BLD AUTO: 7.5 K/UL (ref 4.6–13.2)

## 2019-08-28 PROCEDURE — 80048 BASIC METABOLIC PNL TOTAL CA: CPT

## 2019-08-28 PROCEDURE — C9113 INJ PANTOPRAZOLE SODIUM, VIA: HCPCS | Performed by: SURGERY

## 2019-08-28 PROCEDURE — 82962 GLUCOSE BLOOD TEST: CPT

## 2019-08-28 PROCEDURE — 74011000250 HC RX REV CODE- 250: Performed by: PHYSICIAN ASSISTANT

## 2019-08-28 PROCEDURE — 74011250636 HC RX REV CODE- 250/636: Performed by: SURGERY

## 2019-08-28 PROCEDURE — 36415 COLL VENOUS BLD VENIPUNCTURE: CPT

## 2019-08-28 PROCEDURE — 74011250637 HC RX REV CODE- 250/637: Performed by: SURGERY

## 2019-08-28 PROCEDURE — 74011000250 HC RX REV CODE- 250: Performed by: SURGERY

## 2019-08-28 PROCEDURE — 85027 COMPLETE CBC AUTOMATED: CPT

## 2019-08-28 RX ORDER — SODIUM CHLORIDE 0.9 % (FLUSH) 0.9 %
5-40 SYRINGE (ML) INJECTION AS NEEDED
Status: DISCONTINUED | OUTPATIENT
Start: 2019-08-28 | End: 2019-08-28 | Stop reason: HOSPADM

## 2019-08-28 RX ORDER — SODIUM CHLORIDE 0.9 % (FLUSH) 0.9 %
5-40 SYRINGE (ML) INJECTION EVERY 8 HOURS
Status: DISCONTINUED | OUTPATIENT
Start: 2019-08-28 | End: 2019-08-28 | Stop reason: HOSPADM

## 2019-08-28 RX ORDER — ONDANSETRON 4 MG/1
4 TABLET, ORALLY DISINTEGRATING ORAL
Qty: 12 TAB | Refills: 0 | Status: SHIPPED | OUTPATIENT
Start: 2019-08-28 | End: 2019-09-12 | Stop reason: ALTCHOICE

## 2019-08-28 RX ORDER — OXYCODONE HYDROCHLORIDE 5 MG/1
5 TABLET ORAL
Qty: 15 TAB | Refills: 0 | Status: SHIPPED | OUTPATIENT
Start: 2019-08-28 | End: 2019-08-31

## 2019-08-28 RX ORDER — ENOXAPARIN SODIUM 100 MG/ML
40 INJECTION SUBCUTANEOUS DAILY
Qty: 7 SYRINGE | Refills: 0 | Status: SHIPPED
Start: 2019-08-28 | End: 2019-09-12 | Stop reason: ALTCHOICE

## 2019-08-28 RX ADMIN — OXYCODONE HYDROCHLORIDE 5 MG: 5 TABLET ORAL at 03:40

## 2019-08-28 RX ADMIN — SODIUM CHLORIDE, SODIUM LACTATE, POTASSIUM CHLORIDE, AND CALCIUM CHLORIDE 150 ML/HR: 600; 310; 30; 20 INJECTION, SOLUTION INTRAVENOUS at 05:38

## 2019-08-28 RX ADMIN — ENOXAPARIN SODIUM 40 MG: 40 INJECTION SUBCUTANEOUS at 08:29

## 2019-08-28 RX ADMIN — ONDANSETRON 4 MG: 2 INJECTION INTRAMUSCULAR; INTRAVENOUS at 05:33

## 2019-08-28 RX ADMIN — SODIUM CHLORIDE 10 ML: 9 INJECTION, SOLUTION INTRAMUSCULAR; INTRAVENOUS; SUBCUTANEOUS at 08:30

## 2019-08-28 RX ADMIN — SODIUM CHLORIDE 40 MG: 9 INJECTION, SOLUTION INTRAMUSCULAR; INTRAVENOUS; SUBCUTANEOUS at 08:29

## 2019-08-28 RX ADMIN — KETOROLAC TROMETHAMINE 15 MG: 30 INJECTION, SOLUTION INTRAMUSCULAR at 05:33

## 2019-08-28 RX ADMIN — ACETAMINOPHEN 650 MG: 325 TABLET, FILM COATED ORAL at 05:33

## 2019-08-28 NOTE — PROGRESS NOTES
1950-Bedside and Verbal shift change report given to Larey Schirmer, RN (oncoming nurse) by Terry Yin RN (offgoing nurse). Report included the following information SBAR, Kardex, Procedure Summary, Intake/Output and MAR. 2045- Ambulated in hallway. 2100- Complaint of nausea, prn Zofran given. 0200- Ambulated in hallway. 0500-Ambulated in hallway.

## 2019-08-28 NOTE — DISCHARGE INSTRUCTIONS
DISCHARGE SUMMARY from Nurse    PATIENT INSTRUCTIONS:    After general anesthesia or intravenous sedation, for 24 hours or while taking prescription Narcotics:  · Limit your activities  · Do not drive and operate hazardous machinery  · Do not make important personal or business decisions  · Do  not drink alcoholic beverages  · If you have not urinated within 8 hours after discharge, please contact your surgeon on call. Report the following to your surgeon:  · Excessive pain, swelling, redness or odor of or around the surgical area  · Temperature over 100.5  · Nausea and vomiting lasting longer than 4 hours or if unable to take medications  · Any signs of decreased circulation or nerve impairment to extremity: change in color, persistent  numbness, tingling, coldness or increase pain  · Any questions    What to do at Home:  Please refer to your bariatric binder for all discharge instructions. Please call dr Ballesteros [facility] office for any questions and/or concerns         *  Please give a list of your current medications to your Primary Care Provider. *  Please update this list whenever your medications are discontinued, doses are      changed, or new medications (including over-the-counter products) are added. *  Please carry medication information at all times in case of emergency situations. These are general instructions for a healthy lifestyle:    No smoking/ No tobacco products/ Avoid exposure to second hand smoke  Surgeon General's Warning:  Quitting smoking now greatly reduces serious risk to your health.     Obesity, smoking, and sedentary lifestyle greatly increases your risk for illness    A healthy diet, regular physical exercise & weight monitoring are important for maintaining a healthy lifestyle    You may be retaining fluid if you have a history of heart failure or if you experience any of the following symptoms:  Weight gain of 3 pounds or more overnight or 5 pounds in a week, increased swelling in our hands or feet or shortness of breath while lying flat in bed. Please call your doctor as soon as you notice any of these symptoms; do not wait until your next office visit. The discharge information has been reviewed with the {PATIENT PARENT GUARDIAN:[id number]}. The {PATIENT PARENT GUARDIAN:03505} verbalized understanding. Discharge medications reviewed with the {Dishcarge meds reviewed ZPUO:21189} and appropriate educational materials and side effects teaching were provided.   ___________________________________________________________________________________________________________________________________

## 2019-08-28 NOTE — PROGRESS NOTES
Surgery Progress Note    8/28/2019    Admit Date: 8/27/2019    Subjective:     Patient has complaints of nausea and vomiting now resolved. Pain is controlled with current regimen. Patient has been ambulating in halls. She reports nausea and vomiting now resolved and is tolerating ice chips well. Objective:     Blood pressure (!) 154/94, pulse 76, temperature 98.7 °F (37.1 °C), resp. rate 20, height 5' 9\" (1.753 m), weight 122.7 kg (270 lb 8.1 oz), last menstrual period 06/27/2019, SpO2 99 %. No intake/output data recorded.     08/26 1901 - 08/28 0700  In: 4172.5 [I.V.:4172.5]  Out: 522 [Urine:850]    EXAM: GENERAL: alert, pleasant, no distress   HEART: regular rate and rhythm   LUNGS: clear to auscultation   ABDOMEN:  Soft, obese, appropriately tender, nondistended, incisions clean, dry, no erythema or drainage   EXTREMITIES: warm, well perfused    Data Review    Recent Results (from the past 24 hour(s))   GLUCOSE, POC    Collection Time: 08/27/19 10:06 AM   Result Value Ref Range    Glucose (POC) 202 (H) 70 - 110 mg/dL   CREATININE    Collection Time: 08/27/19 11:00 AM   Result Value Ref Range    Creatinine 0.84 0.6 - 1.3 MG/DL    GFR est AA >60 >60 ml/min/1.73m2    GFR est non-AA >60 >60 ml/min/1.73m2   HEMOGLOBIN A1C WITH EAG    Collection Time: 08/27/19 11:06 AM   Result Value Ref Range    Hemoglobin A1c 7.4 (H) 4.2 - 5.6 %    Est. average glucose 166 mg/dL   GLUCOSE, POC    Collection Time: 08/27/19  5:01 PM   Result Value Ref Range    Glucose (POC) 193 (H) 70 - 110 mg/dL   GLUCOSE, POC    Collection Time: 08/27/19 11:12 PM   Result Value Ref Range    Glucose (POC) 116 (H) 70 - 110 mg/dL   CBC W/O DIFF    Collection Time: 08/28/19  3:50 AM   Result Value Ref Range    WBC 7.5 4.6 - 13.2 K/uL    RBC 4.02 (L) 4.20 - 5.30 M/uL    HGB 9.5 (L) 12.0 - 16.0 g/dL    HCT 31.0 (L) 35.0 - 45.0 %    MCV 77.1 74.0 - 97.0 FL    MCH 23.6 (L) 24.0 - 34.0 PG    MCHC 30.6 (L) 31.0 - 37.0 g/dL    RDW 15.6 (H) 11.6 - 14.5 % PLATELET 781 828 - 211 K/uL    MPV 9.2 9.2 - 70.4 FL   METABOLIC PANEL, BASIC    Collection Time: 08/28/19  3:50 AM   Result Value Ref Range    Sodium 137 136 - 145 mmol/L    Potassium 4.2 3.5 - 5.5 mmol/L    Chloride 106 100 - 111 mmol/L    CO2 25 21 - 32 mmol/L    Anion gap 6 3.0 - 18 mmol/L    Glucose 94 74 - 99 mg/dL    BUN 7 7.0 - 18 MG/DL    Creatinine 0.80 0.6 - 1.3 MG/DL    BUN/Creatinine ratio 9 (L) 12 - 20      GFR est AA >60 >60 ml/min/1.73m2    GFR est non-AA >60 >60 ml/min/1.73m2    Calcium 9.2 8.5 - 10.1 MG/DL   GLUCOSE, POC    Collection Time: 08/28/19  5:50 AM   Result Value Ref Range    Glucose (POC) 115 (H) 70 - 110 mg/dL       Assessment:   Merna Grace is a 53 y.o. female, postop day 1 status post laparoscopic sleeve gastrectomy. Condition: good. Nausea and vomiting overnight improved.      Plan:   -Ambulate every four hours  -Oxycodone 5mg 1-2 tabs po every 4-6 hour prn pain uncontrolled by tylenol  -Advance to Clear liquid Gastric Bypass Diet, if able to tolerate clear liquid diet 4oz per hour one of which being a protein supplement, will discharge home later today      Rosalia Bingham, MS,  PA-C

## 2019-08-28 NOTE — PROGRESS NOTES
Patient was educated on all discharge below. She understood and was provided a copy. She know kayleejo to call if she has questions or concerns. Hydration  Hydration is your NUMBER ONE priority. Dehydration is the most common reason for readmission to the hospital. Dehydration occurs when  your body does not get enough fluid to keep it functioning at its best. Your body also requires fluid  to burn its stored fat calories for energy. Carry a bottle of water with you all day, even when you are away from home; remind yourself to  drink even if you dont feel thirsty. Drinking 64 ounces of fluid is your daily goal. You can tell if  youre getting enough fluid if youre making clear, light-colored urine five to 10 times per day. Signs of dehydration can be thirst, headache, hard stools or dizziness upon sitting or standing up. You should contact your surgeons office if you are unable to drink enough fluid to stay hydrated. --   [address] after Surgery   No lifting over 15 pounds for four weeks.  No driving while taking the pain medication (about seven to 10 days).  No tub baths, swimming or hot tubs until incisions are healed (about two weeks).  You may shower. Clean incisions daily /gently with soap and check incisions for signs of infection:  -- Redness around incision. -- Swelling at site. -- Drainage with an foul odor (pus). -- Increase tenderness around incision.  Take your temperature and resting pulse in the morning and evening. Record on tracking form  given to you. Call if your temperature is greater than 101 or your pulse rate is greater than 115.  Please contact your surgeon if you are having excessive abdominal pain (that lasts longer than  four hours and does not improve with prescribed pain medication), vomiting or shortness of breath.  Get up and move -- do not sit in one place for more than an hour.    You need to WALK (EXERCISE) for 30 minutes per day. -- Walking around your house does not count. -- Bike, treadmill and elliptical are OK. -- NO weight lifting or sit ups.  If constipated take an adult dose of Miralax (available over the counter). Contact the doctors  office if Miralax doesnt help.  You may swallow pills starting the day after surgery as long as they fit inside this [place]:   Continue to use your incentive spirometer (breathing machine) for the next couple of weeks to  help prevent pneumonia. [address]  Temperature/Heart Rate Log  Take your temperature and heart rate (pulse) twice a day for 14 days. Take both in the morning and  evening at about the same time each day (when you wake up and before you go to bed when you  are relaxed). Please contact your doctors office if your:   Temperature is higher than 101 degrees.  Heart rate (pulse) is higher than 115 beats per minute  (normal heart rate is 60 to 100 beats per minute). How to Take Your Heart Rate (Pulse)   Turn your left hand so that your palm is face-up.  With the index and middle fingers of your right  hand, draw a line from the base of your thumb to  just below the crease in your wrist. Your fingers  should nestle just to the left of the large tendon that  pops up when you bend your wrist toward you.  Dont press too hard, that will make the pulse go  away. Use gentle pressure.  Wait. It can take several seconds -- and several micro-adjustments in the placement of your two  fingers on your wrist -- to find your pulse. Just keep moving your fingers down or up your wrist  in small increments (and pausing for a few seconds) until you find it. How to Take Your Pulse Rate   Find a watch with a second hand and place it on your right wrist or on the table next  to your left hand.  After finding your pulse, count the number of beats for 20 seconds.    Multiply by three to get your heart rate, or beats per minute  (or just count for 60 seconds for a math-free option).  Normal, resting heart rate is about 60 to 100 beats per minute. -- 55 --  PATIENT GUIDE TO [address]  Lovenox Self Injection Guide  Prepare  Step 1: Wash and dry your hands thoroughly. Step 2: Sit or lie in a comfortable position and choose an area  on the right or left side of the abdomen at least two inches away  from the belly button. Step 3: Clean the injection site with an alcohol swab and let dry. Inject  Step 4: Remove the needle cap by pulling it straight off the syringe and  discard it in a sharps . Step 5: With your other hand, pinch an inch of the cleansed area to  make a fold in the skin. Insert the full length of the needle straight  down -- at a 90? angle -- into the fold of skin. -- 50 --  PATIENT GUIDE TO [address]  Step 6: Press the plunger with your thumb until the syringe is empty. Then pull the needle straight out and release the skin fold. Dispose  Step 7: Point the needle down and away from yourself and others,  and then push down on the plunger to activate the safety shield. Step 8: Place the used syringe in the sharps . Do NOT expel the air bubble from the syringe before the injection. Administration should be alternated between the left and right abdominal wall. The whole length  of the needle should be introduced into a skin fold held between the thumb and forefinger; the  skin fold should be held throughout the injection. To minimize bruising, do not rub the injection  site after completion of the injection. Questions about LOVENOX? Call [phone]  -- 49 --  PATIENT GUIDE TO [address] [place] [address]  9.  DIET AND LIFESTYLE  Key Diet Principles Following Bariatric Surgery   Begin each meal with soft moist high protein foods (i.e. chicken, turkey, yogurt, tuna, eggs,  cottage cheese, other fish and seafood).  Consume a minimum of 64 ounces of fluid each day to prevent dehydration. No straws.  No food and fluid together. Stop drinking 30 minutes before a meal. You may begin fluids again  30 minutes after you finish a meal.   Eat very slowly and chew all foods completely.  Keep portions small.  No simple sugars or high fat foods. No carbonated beverages. No caffeine.  Eat three meals per day. No skipping. Avoid snacking between meals.  No alcohol. No smoking.  Two Flintstones Complete Chewable vitamins each day. Take one in the morning and one at night.  1,500 milligrams Calcium Citrate per day in separate dosages.  Vitamin D 3: 5,000 IU taken per day.  Vitamin B-12: Take 1,000 micrograms sublingually daily.  Iron: 60 milligrams per day from [facility].  Protein supplements of your choice. Must be low sugar (0 to 3 grams), low fat (0 to 3 grams) and  provide at least 35 to 40 grams of protein each day. You need 60 to 70 grams of protein (food  and supplements) each day.  Minimum of 30 minutes of physical activity daily. Do not antionette NSAIDS . Do not take Steroids without your surgeons permission. -- 48 --  [facility]  Your Priorities After Surgery  ? Fluid: 64 ounces of fluid per day. ? Protein: 60 to 70 grams of protein per day. ? Walk every day. Clear Liquid Diet  One week of clear liquids: minimum of 64 ounces of fluid per day. Fluid:   Zero calorie liquids.  No caffeine.  No carbonation.  No sugary drinks.  No alcohol.  No straws. Food   Protein drinks.  Less than 3 grams of sugar and  3 grams of fat per serving.  Protein drink should provide you with  60 to 70 grams of protein. Soft Protein Diet  Five weeks of soft protein (1 ounce for soft protein, 3 ounces of yogurt/cottage cheese). Three meals per day and 1 protein shake.  Protein shakes should provide you with 30 grams of  protein on the soft protein diet. Slow transition:   First week on soft protein diet -- focus on yogurt, cottage cheese, eggs, vegetarian refried beans,  black beans, kidney beans and white beans. (NO BAKED BEANS.)   Second through fourth week on soft protein diet -- focus on yogurt, cottage cheese, eggs,  canned tuna, canned chicken, tilapia and fish (needs to be soft enough to be cut up with a fork).  Fifth week on soft protein diet -- focus on yogurt, cottage cheese, eggs, canned tuna, canned  chicken, tilapia, fish, salmon, chicken breast or turkey. Fluid is your #1 Priority! Continue clear liquids between meals. You will need 64 ounces of fluid per day. Fluids that you can have include:   Water.  Zero calorie liquids. You will need to sip throughout the day and should therefore have a water bottle with you at all  times! No liquids with your meals. Stop 30 minutes before a meal and wait 30 minutes after a meal.  No straws. Zero calorie liquids. No caffeine. No carbonation. No sugary drinks. No alcohol. -- 46 --  60 [facility]  Protein  You will need 60 to 70 grams of protein per day.  60 to 70 grams of protein shakes when on the clear liquid diet (two to three shakes per day).  30 to 50 grams of protein shakes when on the soft protein diet (one shake per day). Eat Three Times Per Day  You will need to eat three times per day. My planned times are:  _________________________________________________________  _________________________________________________________  _________________________________________________________  Nausea, Vomiting, Stomach Pain  If you have problems with nausea, vomiting or stomach pain, try:   Eating slowly: 20 to 30 minutes per meal.   Chewing food thoroughly: 20 to 30 chews before food is swallowed.  Small portions: measure portions in medicine cup.  Stopping before feeling full.    AVOIDING SUGAR and FRIED FOOD: sugar will cause dumping syndrome and lead to weight gain. Exercise  I will need to get a minimum of 30 minutes of exercise per day or 150 minutes of exercise per week.  Walking, swimming, biking or elliptical.   Find something you enjoy! Vitamins  After surgery, you will need to take the following vitamins for the rest of your life -- FOREVER.  Vitamin D 3: 5,000 IU per day.  Calcium Citrate: 1,500 milligrams, taken separately.  Flintstones Complete: two per day, taken separately.  Sublingual Vitamin B-12: 1,000 micrograms daily.  Iron for menstruating women or patients with a history of low iron: 65 milligrams daily. We recommend going to [email or web address]. I [employer] You and purchasing iron from there. The lemon-lime has 60 milligrams. This iron is better absorbed than over-the-counter iron. -- 46 --  PATIENT GUIDE TO BARIATRIC [address] [place] [address] Rd  Clear Liquid Log  Getting your fluid in is top priority during this week. Fluids (MINIUM of 64 ounces per day):  ? 8 oz. ? 8 oz. ? 8 oz. ? 8 oz. ? 8 oz. ? 8 oz. ? 8 oz. ? 8 oz. ? 8 oz. ? 8 oz. ? 8 oz. ? 8 oz. Flintstones Complete Chewable: ? a.m. ? p.m. Calcium Citrate (1,500 milligrams/day):  Pill form  ? Two crushed pills (morning) ? Two crushed pills (afternoon) ? Two crushed pills (evening)  OR Upcal D (powder)  ? One pack/scoop ? One pack/scoop ? One pack/scoop  OR Celebrate Chewable Vitamins (500 mg each) or Bariatric Advantage Chewables (500 mg)  ? One chewable (morning) ? One chewable (afternoon) ? One chewable (evening)  OR Liquid Calcium Citrate  ? 1 tbsp. Calcium Citrate ? 1 tbsp. Calcium Citrate ? 1 tbsp. Calcium Citrate  Vitamin D3: ? 5,000 IU daily. Vitamin B-12: ? 1,000 micrograms per day. Iron (menstruating women or patients with a history of low iron):  ? 60 milligrams per day from [facility] [employer]. Protein drinks (protein drinks should be under 3 grams of sugar and 3 grams of fat).   Protein shake (60 grams per day): ? a.m. ? p.m. Exercise: ? 30 to 40 minutes per day. -- 48 --  PATIENT GUIDE TO BARIATRIC [address]  Bariatric Soft and Moist Diet Shopping List   alcium Citrate (1,500 milligrams/day):  Pill form  ? Two crushed pills (morning) ? Two crushed pills (afternoon) ? Two crushed pills (evening)  OR Upcal D (powder)  ? One pack/scoop ? One pack/scoop ? One pack/scoop  OR Celebrate Chewable Vitamins (500 mg each) or Bariatric Advantage Chewables (500 mg)  ? One chewable (morning) ? One chewable (afternoon) ? One chewable (evening)  OR Liquid Calcium Citrate  ? 1 tbsp. Calcium Citrate ? 1 tbsp. Calcium Citrate ? 1 tbsp. Calcium Citrate  Vitamin D3: ? 5,000 IU daily  Vitamin B-12: ? 1,000 micrograms per day  Iron (menstruating women or  patients with a history of low iron):  ? 60 milligrams per day  from [facility]  Protein drinks (protein drinks should be under  3 grams of sugar and 3 grams of fat). Protein shake (30 to 40 grams per day):  ? a.m. ? p.m. Exercise: ? 30 to 40 minutes per  Educated on Diet Progression    [facility] Gastric Bypass and Sleeve Dietary Progression    Patient Name:   Date of Surgery: Ice Chips start once admitted on floor. Begin Bariatric Clear Liquid Diet on:     Clear Liquid Diet: 64 oz. of fluid per day  o Low calorie, low sugar, non-carbonated beverages  - Water, Crystal Light, Propel Water, Sugar Free Jell-O, Sugar Free Popsicles, Bouillon  - Start protein supplement during this stage. (60-70 grams per day)  - Getting your fluid in and staying hydrated is your #1 priority! - The clear liquid diet will last for 7 days. Begin Bariatric Soft and Moist on: 9/4  - This stage of the diet will last for 5 weeks, unless otherwise instructed by your surgeon.   - Begin:  1 week post-op   - End:  6 weeks post-op (or when you follow up with the Registered Dietitian)    - Soft, moist, high protein foods: 3 meals per day plus protein supplements. o   Portions should emphasize on soft protein. o Portions will be a MAXIMUM of:  o  1 ounce of solid food  o  2-3 ounces of cottage cheese and yogurt. o Protein supplements should be between meals and provide 30-40 grams per day during soft protein diet. o Continue to get 64 ounces of fluid in per day. - Protein foods that are ok on the Soft and Moist Diet include:  o Slow transition:  o 1st week on soft protein should focus on: Yogurt, cottage cheese, eggs  o 2nd -4th  week on soft protein diet should focus on: yogurt, cottage cheese, eggs, canned tuna, canned chicken, tilapia, fish (needs to be soft enough to be cut up with a fork)  o 5th week on soft protein diet should focus on: Yogurt, cottage cheese, eggs, canned tuna, canned chicken, tilapia, fish, salmon, chicken breast, or turkey. Remember to continue to get 64 ounces of fluid daily on ALL Stages. To be advanced to Bariatric Maintenance Stage of the bariatric diet, follow up with the dietitian 6 weeks post-op, around:         For any additional questions, please refer to your blue binder that was provided to you at the start of the bariatric program.

## 2019-08-28 NOTE — PROGRESS NOTES
conducted an initial consultation and Spiritual Assessment for [place] [employer], who is a 53 y.o.,female. Patients Primary Language is: [place]. According to the patients EMR Hindu Affiliation is: Anabaptism.     The reason the Patient came to the hospital is:   Patient Active Problem List    Diagnosis Date Noted    Morbid obesity ([address] 08/27/2019    Hepatic steatosis 91/96/2775    Periumbilical hernia 68/52/9828    Obesity, morbid ([address] 05/29/2019    Left lower quadrant pain 07/14/2017    Fibromyalgia 05/16/2017    Encounter for long-term (current) use of medications 03/23/2017    Polyarthralgia 03/23/2017    Chronic migraine 03/23/2017    Cervico-occipital neuralgia 03/23/2017    Generalized OA 03/23/2017    Diabetes mellitus type 2 in obese ([address] 03/23/2017    Chronic pain due to trauma 03/23/2017    Chronic insomnia 03/23/2017    Neuropathic pain 03/23/2017        The  provided the following Interventions:  Initiated a relationship of care and support with patient in room 2214 this morning on day one post surgery. Listened empathically as patient related her story and hopes for the future. Patient says she is feeling great this morning. Provided information about Spiritual Care Services. Offered prayer and assurance of continued prayers on patients behalf. The following outcomes were achieved:  Patient shared limited information about her medical narrative and spiritual journey/beliefs. Patient processed feeling about current hospitalization. Patient expressed gratitude for pastoral care visit. Assessment:  Patient does not have any [employer]/cultural needs that will affect patients preferences in health care. There are no further spiritual or [employer]aritan issues which require Spiritual Care Services interventions at this time. Plan:  Chaplains will continue to follow and will provide pastoral care on an as needed/requested basis    . José Manuel Thomas [address] Certified [address] [place] [address]   [phone]

## 2019-08-28 NOTE — DIABETES MGMT
Glycemic Control Plan of Care    Sitting up in chair this morning - tolerating clear liquid diet. Reviewed her new A1c and recent BG values - instructed her to continue to monitor her BG at home - twice daily - and record for her PCP - does not require any DM medications at discharge and instructed to f/u with her PCP to monitor A1c.       Your A1C  was   Lab Results   Component Value Date/Time    Hemoglobin A1c 7.4 (H) 08/27/2019 11:06 AM   .        Shiv Rodriguez MPH RN  Pager [phone]  Office [phone]

## 2019-08-28 NOTE — PROGRESS NOTES
Problem: Falls - Risk of  Goal: *Absence of Falls  Description  Document López Tierney Fall Risk and appropriate interventions in the flowsheet. Outcome: Progressing Towards Goal  Note:   Fall Risk Interventions:  Mobility Interventions: Patient to call before getting OOB         Medication Interventions: Patient to call before getting OOB, Teach patient to arise slowly    Elimination Interventions: Call light in reach, Patient to call for help with toileting needs, Toileting schedule/hourly rounds              Problem: Patient Education: Go to Patient Education Activity  Goal: Patient/Family Education  Outcome: Progressing Towards Goal     Problem: Discharge Planning  Goal: *Discharge to safe environment  Outcome: Progressing Towards Goal     Problem: Diabetes Self-Management  Goal: *Disease process and treatment process  Description  Define diabetes and identify own type of diabetes; list 3 options for treating diabetes. Outcome: Progressing Towards Goal  Goal: *Incorporating nutritional management into lifestyle  Description  Describe effect of type, amount and timing of food on blood glucose; list 3 methods for planning meals. Outcome: Progressing Towards Goal  Goal: *Incorporating physical activity into lifestyle  Description  State effect of exercise on blood glucose levels. Outcome: Progressing Towards Goal  Goal: *Developing strategies to promote health/change behavior  Description  Define the ABC's of diabetes; identify appropriate screenings, schedule and personal plan for screenings. Outcome: Progressing Towards Goal  Goal: *Using medications safely  Description  State effect of diabetes medications on diabetes; name diabetes medication taking, action and side effects. Outcome: Progressing Towards Goal  Goal: *Monitoring blood glucose, interpreting and using results  Description  Identify recommended blood glucose targets  and personal targets.   Outcome: Progressing Towards Goal  Goal: *Prevention, detection, treatment of acute complications  Description  List symptoms of hyper- and hypoglycemia; describe how to treat low blood sugar and actions for lowering  high blood glucose level. Outcome: Progressing Towards Goal  Goal: *Prevention, detection and treatment of chronic complications  Description  Define the natural course of diabetes and describe the relationship of blood glucose levels to long term complications of diabetes.   Outcome: Progressing Towards Goal  Goal: *Developing strategies to address psychosocial issues  Description  Describe feelings about living with diabetes; identify support needed and support network  Outcome: Progressing Towards Goal  Goal: *Insulin pump training  Outcome: Progressing Towards Goal  Goal: *Sick day guidelines  Outcome: Progressing Towards Goal  Goal: *Patient Specific Goal (EDIT GOAL, INSERT TEXT)  Outcome: Progressing Towards Goal     Problem: Patient Education: Go to Patient Education Activity  Goal: Patient/Family Education  Outcome: Progressing Towards Goal     Problem: Pain  Goal: *Control of Pain  Outcome: Progressing Towards Goal     Problem: Patient Education: Go to Patient Education Activity  Goal: Patient/Family Education  Outcome: Progressing Towards Goal     Problem: Patient Education: Go to Patient Education Activity  Goal: Patient/Family Education  Outcome: Progressing Towards Goal     Problem: Laparoscopic Gastric Bypass:Post-Op Day 1  Goal: Off Pathway (Use only if patient is Off Pathway)  Outcome: Progressing Towards Goal  Goal: Activity/Safety  Outcome: Progressing Towards Goal  Goal: Diagnostic Test/Procedures  Outcome: Progressing Towards Goal  Goal: Nutrition/Diet  Outcome: Progressing Towards Goal  Goal: Discharge Planning  Outcome: Progressing Towards Goal  Goal: Medications  Outcome: Progressing Towards Goal  Goal: Respiratory  Outcome: Progressing Towards Goal  Goal: Treatments/Interventions/Procedures  Outcome: Progressing Towards Goal  Goal: Psychosocial  Outcome: Progressing Towards Goal  Goal: *No signs and symptoms of infection or wound complications  Outcome: Progressing Towards Goal  Goal: *Optimal pain control at patient's stated goal  Outcome: Progressing Towards Goal  Goal: *Adequate urinary output (equal to or greater than 30 milliliters/hour)  Outcome: Progressing Towards Goal  Goal: *Hemodynamically stable  Outcome: Progressing Towards Goal  Goal: *Tolerating diet  Outcome: Progressing Towards Goal  Goal: *Demonstrates progressive activity  Outcome: Progressing Towards Goal  Goal: *Absence of signs and symptoms of DVT  Outcome: Progressing Towards Goal  Goal: *Labs within defined limits  Outcome: Progressing Towards Goal  Goal: *Oxygen saturation within defined limits  Outcome: Progressing Towards Goal  Goal: *Upper GI series/No leak identified  Outcome: Progressing Towards Goal

## 2019-08-28 NOTE — PROGRESS NOTES
Patient was educated on progression of diet this AM. Goal of 4 ounces per hour with one ounce being protein was clearly understood. Patient was instructed to go slow with small sips to reach goal. Patient given a report card to record intake. Education completed on I.S use and to ambulate in [place] at least 4 times. Will follow up and check progression.

## 2019-08-28 NOTE — PROGRESS NOTES
Bedside and Verbal shift change report given to Abiola Cox RN (oncoming nurse) by Fritz (offgoing nurse). Report included the following information SBAR, Kardex, Procedure Summary, Intake/Output and MAR.

## 2019-08-29 ENCOUNTER — TELEPHONE (OUTPATIENT)
Dept: MEDSURG UNIT | Age: 44
End: 2019-08-29

## 2019-08-29 NOTE — PROGRESS NOTES
0720 Received pt in bed, denies any pain, no n/v. Lap sites c/d/i. Voiding to bathroom, ambulated several times in the hallway per report. IV site no sign of infiltration. 1200 Completed 3 sets of 4 oz of fluid intake, ambulating in between. No n/v.    1345 Pain at tolerable level. Pt discharged,verbalized understanding of dc instructions. Ambulated several times in the hallway, pt using IS independently.  No n/v.

## 2019-08-29 NOTE — TELEPHONE ENCOUNTER
Post Op Follow Up Call- Bariatric Surgery  Date of Surgery          Type of Surgery    Pain 3/10  V.S HR 67 T 97.7  Lovenox yes  Water 24 ounces so far today  Protein 4 ounces   Other  Walking 15 minutes  BM  Nausea no  Vomiting no  Vitamins no  Recommendations  Instructed to call office if not getting enough fluids  Instructed to call office for ant problems before visiting E.R    Follow up appointment  Surgeon yes  Dietitian yes

## 2019-08-30 NOTE — DISCHARGE SUMMARY
.Bariatric Surgery Discharge Progress Note    Admission Date: 8/27/2019    Discharge Date: 8/28/2019      Admission Diagnosis:    Clinically severe Obesity    Comorbidities:  Diabetes mellitus, hypertension, migraine headaches    Discharge Diagnosis:     Clinically Severe Obesity, s/p laparoscopic Justin-en-Y divided gastric bypass with comorbidities as listed above    Procedures:   Laparoscopic Justin-en-Y divided gastric bypass    Postop Complications: None    Hospital Course:  Patient was admitted on 8/27/2019 for scheduled bariatric surgery. Operation was without significant complication. Patient admitted to the floor postoperatively, monitored as per protocol. Diet sequentially advanced beginning POD 1, pain medications transitioned to oral during the hospital course. At the time of discharge, the patient is afebrile, vital signs stable, tolerating a clear liquid diet with protein supplementation, voiding spontaneously, ambulatory with adequate pain control with oral medications and clear surgical sites without evidence of infection. Discharge Diet:  Clear Liquid Bariatric Diet for 7 days, then soft moist protein diet for 5 weeks    Discharge Medications:   *All medications as per Medical Reconciliation Form\"    Bariatric Chewable vitamins, 2 orally daily for life  Calcium Citrate 2000mg orally daily for life  Vitamin B12 1000micrograms sublingual daily for life  Oxycodone 5mg tab 1-2 by mouth every 4-6 hours as needed for pain uncontrolled with Tylenol  Enoxaparin (Lovenox) 40mg sub-Q daily for 7 days    Discharge disposition: home    Diabetes medication has been adjusted with the input of the diabetic educator. Please see medical reconciliation for details.      Local wound care with daily showers, keep wounds clean and dry    Activity: as desired, no lifting greater than 15lbs or situps for 30 days    Special Instructions:   No driving until activity is not influenced by incisional pain and off narcotics   No bath or hot tub until wounds are healed   Pulse and temperature twice daily for 10 days   Notify [facility] for a Temp >100.5 or Pulse>115    Followup with surgeon in 2 weeks    Aurelia Brennan MS, PADorothyC

## 2019-09-12 ENCOUNTER — OFFICE VISIT (OUTPATIENT)
Dept: SURGERY | Age: 44
End: 2019-09-12

## 2019-09-12 VITALS
HEIGHT: 67 IN | RESPIRATION RATE: 18 BRPM | DIASTOLIC BLOOD PRESSURE: 82 MMHG | SYSTOLIC BLOOD PRESSURE: 133 MMHG | BODY MASS INDEX: 40.93 KG/M2 | WEIGHT: 260.8 LBS | HEART RATE: 80 BPM | TEMPERATURE: 98.3 F | OXYGEN SATURATION: 95 %

## 2019-09-12 DIAGNOSIS — K91.2 POSTOPERATIVE MALABSORPTION: Primary | ICD-10-CM

## 2019-09-12 RX ORDER — CHOLECALCIFEROL TAB 125 MCG (5000 UNIT) 125 MCG
5000 TAB ORAL DAILY
COMMUNITY

## 2019-09-12 RX ORDER — LANOLIN ALCOHOL/MO/W.PET/CERES
CREAM (GRAM) TOPICAL DAILY
COMMUNITY

## 2019-09-12 RX ORDER — LANOLIN ALCOHOL/MO/W.PET/CERES
CREAM (GRAM) TOPICAL
COMMUNITY
End: 2021-01-09

## 2019-09-12 RX ORDER — LANOLIN ALCOHOL/MO/W.PET/CERES
1000 CREAM (GRAM) TOPICAL DAILY
COMMUNITY

## 2019-09-12 NOTE — LETTER
9/12/19 Patient: Kenya Platt YOB: 1975 Date of Visit: 9/12/2019 Naheed Palmer MD 
[address] [facility] [place] [address] VIA Facsimile: [phone] Dear Naheed Palmer MD, Thank you for referring Ms. Parish Cui to [place] [address] for evaluation. My notes for this consultation are attached. If you have questions, please do not hesitate to call me. I look forward to following your patient along with you.  
 
 
Sincerely, 
 
Kuldip Ramirez MD

## 2019-09-12 NOTE — LETTER
NOTIFICATION RETURN TO WORK / SCHOOL 
 
9/12/2019 2:28 PM 
 
Ms. Randee Ames 
[address] [facility] [address] To Whom It May Concern: 
 
Sarah Bond is currently under the care of [address] [place] [address]. She will return to work/school on: 9/16/2019. No lifting over 15 pounds until 10/8/2019. If there are questions or concerns please have the patient contact our office.  
 
 
 
Sincerely, 
 
 
Jose Anton MD

## 2019-09-12 NOTE — PROGRESS NOTES
Subjective:      Merna Grace is a 53 y.o. female is now 2 weeks status post laparoscopic gastric bypass surgery. Doing well overall. Currently on a stage 3 diet without difficulty. Taking in 64oz water,  40g protein. 30min of activity daily. Bowel movements are constipated. The patient is not having any pain. .  The patient is compliant with multivitamins, calcium and B12 supplements. Weight Loss Metrics 2019   Pre op / Initial Wt 270 - - - 270.4 - -   Today's Wt - 260 lb 12.8 oz 270 lb 8.1 oz 269 lb - 270 lb 6.4 oz 278 lb   BMI - 40.85 kg/m2 39.95 kg/m2 42.13 kg/m2 - 42.35 kg/m2 43.54 kg/m2   Ideal Body Wt 140 - - - 140 - -   Excess Body Wt 130 - - - 130.4 - -   Goal Wt - - - - 166 - -   Wt loss to date 9.2 - - - 0 - -   % Wt Loss 0.09 - - - 0 - -   80%  - - - 104.32 - -       Body mass index is 40.85 kg/m². Comorbidities:    Hypertension: improved  Diabetes: resolved  Obstructive Sleep Apnea: not applicable  Hyperlipidemia: not applicable  Stress Urinary Incontinence: not applicable  Gastroesophageal Reflux: resolved  Weight related arthropathy:not applicable        Past Medical History:   Diagnosis Date    Diabetes ([facility] [address]     Essential hypertension     Hiatal hernia     Migraines     Neurological disorder     migraines    Neuropathy        Past Surgical History:   Procedure Laterality Date    HX  SECTION      c section    HX CHOLECYSTECTOMY      HX GASTRIC BYPASS  2019    HX ORTHOPAEDIC  1996    pelvic repair       Current Outpatient Medications   Medication Sig Dispense Refill    sucralfate (CARAFATE) 1 gram tablet sucralfate 1 gram tablet   Take 1 tablet 4 times a day by oral route for 30 days.  hydrOXYzine pamoate (VISTARIL) 50 mg capsule Take 25 mg by mouth three (3) times daily as needed for Itching.  pantoprazole (PROTONIX) 40 mg tablet Take 40 mg by mouth daily.       senna (SENNA) 8.6 mg tablet Take 1 Tab by mouth daily as needed for Constipation. 30 Tab 2    capsaicin (CAPZASIN-HP) 0.1 % topical cream Apply cream to lower back and lower legs TID. Avoid use on damaged, broken or irritated skin. Avoid occlusive dressings or heat while using this medication. 42.5 g 2    topiramate (TOPAMAX) 100 mg tablet Take  by mouth two (2) times a day.  dicyclomine (BENTYL) 20 mg tablet Take 20 mg by mouth every eight (8) hours.  insulin glargine,hum.rec.anlog (LANTUS SC) 60 Units by SubCUTAneous route daily. Allergies   Allergen Reactions    Aspirin Other (comments)     Chest pain    Darvocet A500 [Propoxyphene N-Acetaminophen] Hives    Pcn [Penicillins] Hives    Sulfur Hives    Sulfa (Sulfonamide Antibiotics) Hives         Objective:     Visit Vitals  /82   Pulse 80   Temp 98.3 °F (36.8 °C) (Oral)   Resp 18   Ht 5' 7\" (1.702 m)   Wt 118.3 kg (260 lb 12.8 oz)   LMP 08/30/2019 (Approximate)   SpO2 95%   BMI 40.85 kg/m²       General:  alert, cooperative, no distress, appears stated age   Chest: no accessory muscle use   Cor:   Regular rate and rhythm   Abdomen: soft, bowel sounds active, non-tender   Incision:   healing well, no drainage, no erythema, no hernia, no seroma, no swelling, no dehiscence, incision well approximated       Labs: none    Assessment:     Doing well postoperatively.     Plan:     Increase activity to the goal of 30 minutes daily, Okay to proceed with [facility], Follow up labs as ordered, Increase fluids, Follow up with Registered Dietician and Continue MVI/Ca/B12 supplementation  Follow up in 3 months

## 2019-09-12 NOTE — PROGRESS NOTES
Chief Complaint   Patient presents with    Post OP Follow Up     GBP 8/27/2019     Pt ID confirmed    Weight Loss Metrics 9/12/2019 9/12/2019 8/27/2019 8/9/2019 8/2/2019 8/2/2019 6/12/2019   Pre op / Initial Wt 270 - - - 270.4 - -   Today's Wt - 260 lb 12.8 oz 270 lb 8.1 oz 269 lb - 270 lb 6.4 oz 278 lb   BMI - 40.85 kg/m2 39.95 kg/m2 42.13 kg/m2 - 42.35 kg/m2 43.54 kg/m2   Ideal Body Wt 140 - - - 140 - -   Excess Body Wt 130 - - - 130.4 - -   Goal Wt - - - - 166 - -   Wt loss to date 9.2 - - - 0 - -   % Wt Loss 0.09 - - - 0 - -   80%  - - - 104.32 - -       Body mass index is 40.85 kg/m².     Post op medications:  MVI: flintstones twice daily  Calcium Citrate: 1500 milligrams  B-1 100 milligrams  B-12 1000 micrograms  Vit D 3 5000 units

## 2019-10-04 DIAGNOSIS — M79.2 NEUROPATHIC PAIN: ICD-10-CM

## 2019-10-04 DIAGNOSIS — G89.4 CHRONIC PAIN SYNDROME: Primary | ICD-10-CM

## 2019-10-04 DIAGNOSIS — M70.61 TROCHANTERIC BURSITIS OF BOTH HIPS: ICD-10-CM

## 2019-10-04 DIAGNOSIS — M46.1 SACROILIITIS (HCC): ICD-10-CM

## 2019-10-04 DIAGNOSIS — M70.62 TROCHANTERIC BURSITIS OF BOTH HIPS: ICD-10-CM

## 2019-10-04 RX ORDER — HYDROCODONE BITARTRATE AND ACETAMINOPHEN 5; 325 MG/1; MG/1
1 TABLET ORAL
Qty: 60 TAB | Refills: 0 | Status: SHIPPED | OUTPATIENT
Start: 2019-10-04 | End: 2019-11-04 | Stop reason: SDUPTHER

## 2019-10-11 ENCOUNTER — HOSPITAL ENCOUNTER (OUTPATIENT)
Dept: BARIATRICS/WEIGHT MGMT | Age: 44
Discharge: HOME | End: 2019-10-11

## 2019-10-11 ENCOUNTER — DOCUMENTATION ONLY (OUTPATIENT)
Dept: BARIATRICS/WEIGHT MGMT | Age: 44
End: 2019-10-11

## 2019-10-11 DIAGNOSIS — K91.2 POSTOPERATIVE MALABSORPTION: Primary | ICD-10-CM

## 2019-10-11 NOTE — PROGRESS NOTES
[facility]  POST-OP NUTRITION FOLLOW UP    Patient's Name: Jenny Hodge  YOB: 1975  Surgery Date: 19      Procedure: Gastric Bypass    Surgeon: Dr. Beth Cook    Height: 5 f 7     Pre-Op Weight: 270     Current Weight: 250  Weight Lost: 20    BMI:  39.3    Attendance of support group:   When:   Why not:     Complications  Readmittance: None  Reoperations: None  Complications: None  IV Fluids: None  ER Trips: None    Problem Areas:   Nausea: None   Vomiting: None   Dumping Syndrome: None  Inadequate Protein: None, patient states she is getting at least one protein shake per day. Inadequate Fluids: Sometimes. She states she gets in between 50-64 ounces per day. Food Intolerance: None  Hunger:  None  Constipation: None    Eating 3 Meals/Day: Yes  Portion Size at Meals:  2 ounces     Protein from Food:  Yes    Foods being consumed:  Breakfast: Time: 7:30 am:  Protein shake  Lunch: Time: 1:00 pm:   Baked chicken she brings to work  Dinner:  Time: 7:00 pm:   Fish or chicken  In-between eating: None    Length of time for meals: 15 minutes    food/fluids:  30/30    Fluids: 50-64 oz/day   Types of Fluids: Water, Crystal Light    MVI: Flintstones    Number/Day: bid   Taken Separately: yes    Vitamin B1: Yes  Dosin mg    Calcium: chewable    Calcium Dosing: tid    Taken Separately: yes    Vitamin B12: sublingual   Vitamin B12 Dosin mcg    Vitamin D:      Vitamin D dosin IU    Iron: Patient states she hasn't been taking, but her cycle hasn't been consistent. Iron dosing:      Protein Supplement: Protein 2 0 or Premier shake. Grams of Protein: 30   Mixed with:      Splitting Protein Drink in :    Timing of Protein Drinks:   Patient is taking 7 days a week. Exercise: walking 30 minutes per day      Comments:    Patient is doing well at 6 weeks post op. Her portions are appropriate. She is sticking to soft protein.   She does need to work on increasing her fluid. Suggestions were made. She is taking her vitamins and walking 30 minutes daily. Patient was educated on the importance of eating meat and vegetables only. I have talked with patient about the effects of carbohydrates, not only from a weight management perspective, but also what effects it could have on their blood sugar and what reactive hypoglycemia is.         Diet Follow Up:  9 month class scheduled for: May 20 at 3 pm    [facility]    10/11/2019

## 2019-11-04 ENCOUNTER — OFFICE VISIT (OUTPATIENT)
Dept: PAIN MANAGEMENT | Age: 44
End: 2019-11-04

## 2019-11-04 VITALS
OXYGEN SATURATION: 99 % | SYSTOLIC BLOOD PRESSURE: 137 MMHG | TEMPERATURE: 98.6 F | HEIGHT: 67 IN | DIASTOLIC BLOOD PRESSURE: 88 MMHG | HEART RATE: 73 BPM | BODY MASS INDEX: 36.73 KG/M2 | RESPIRATION RATE: 18 BRPM | WEIGHT: 234 LBS

## 2019-11-04 DIAGNOSIS — M54.50 CHRONIC BILATERAL LOW BACK PAIN: ICD-10-CM

## 2019-11-04 DIAGNOSIS — Z79.899 ENCOUNTER FOR LONG-TERM (CURRENT) USE OF HIGH-RISK MEDICATION: ICD-10-CM

## 2019-11-04 DIAGNOSIS — M79.2 NEUROPATHIC PAIN: ICD-10-CM

## 2019-11-04 DIAGNOSIS — M46.1 SACROILIITIS (HCC): ICD-10-CM

## 2019-11-04 DIAGNOSIS — M70.62 TROCHANTERIC BURSITIS OF BOTH HIPS: Primary | ICD-10-CM

## 2019-11-04 DIAGNOSIS — M70.61 TROCHANTERIC BURSITIS OF BOTH HIPS: Primary | ICD-10-CM

## 2019-11-04 DIAGNOSIS — G89.4 CHRONIC PAIN SYNDROME: ICD-10-CM

## 2019-11-04 DIAGNOSIS — G89.29 CHRONIC BILATERAL LOW BACK PAIN: ICD-10-CM

## 2019-11-04 RX ORDER — CYCLOBENZAPRINE HCL 10 MG
TABLET ORAL
Refills: 0 | COMMUNITY
Start: 2019-10-21 | End: 2019-12-12

## 2019-11-04 RX ORDER — PREGABALIN 20 MG/ML
2.5 SOLUTION ORAL 2 TIMES DAILY
Qty: 473 ML | Refills: 2 | Status: SHIPPED | OUTPATIENT
Start: 2019-11-04 | End: 2019-12-04

## 2019-11-04 RX ORDER — HYDROCODONE BITARTRATE AND ACETAMINOPHEN 5; 325 MG/1; MG/1
1 TABLET ORAL
Qty: 60 TAB | Refills: 0 | Status: SHIPPED | OUTPATIENT
Start: 2019-11-07 | End: 2019-12-07

## 2019-11-04 RX ORDER — CAPSAICIN 0.1 %
CREAM (GRAM) TOPICAL
Qty: 42.5 G | Refills: 2 | Status: SHIPPED | OUTPATIENT
Start: 2019-11-04 | End: 2019-12-12

## 2019-11-04 NOTE — PROGRESS NOTES
Nursing Notes    Patient presents to the office today in follow-up. Patient rates her pain at 6/10 on the numerical pain scale. Reviewed medications with counts as follows:    Rx Date filled Qty Dispensed Pill Count Last Dose Short   norco 5/325 mg  09/03/19 60 7 2 days ago                                        reviewed YES  Any aberrancies noted on  NO  Last opioid agreement 06/12/19  Last urine drug screen 05/13/19- due today    Comments:     POC UDS was not performed in office today    Any new labs or imaging since last appointment? YES. Pt had multiple labs done with her recent gastric bypass surgery    Have you been to an emergency room (ER) or urgent care clinic since your last visit? YES. Pt went to urgent care after a fall in October. Have you been hospitalized since your last visit? YES. If yes, where, when, and reason for visit? Pt was at [facility] for one night after her lap gastric bypass    Have you seen or consulted any other health care providers outside of the [address] [place] [address]  since your last visit? YES    If yes, where, when, and reason for visit? Bariatric surgery  Ms. Naren Zuñiga has a reminder for a \"due or due soon\" health maintenance. I have asked that she contact her primary care provider for follow-up on this health maintenance.     3 most recent PHQ Screens 11/4/2019   Little interest or pleasure in doing things Not at all   Feeling down, depressed, irritable, or hopeless Not at all   Total Score PHQ 2 0   Trouble falling or staying asleep, or sleeping too much -   Feeling tired or having little energy -   Poor appetite, weight loss, or overeating -   Feeling bad about yourself - or that you are a failure or have let yourself or your family down -   Trouble concentrating on things such as school, work, reading, or watching TV -   Moving or speaking so slowly that other people could have noticed; or the opposite being so fidgety that others notice - Thoughts of being better off dead, or hurting yourself in some way -   PHQ 9 Score -   How difficult have these problems made it for you to do your work, take care of your home and get along with others -

## 2019-11-04 NOTE — PATIENT INSTRUCTIONS

## 2019-11-04 NOTE — LETTER
11/4/2019 8:12 AM 
 
Ms. Sirena Ames 
[address] [id number] To whom it may concern, Zoë Spears currently holds a controlled substance contract for the management of chronic pain through our office. [place] prescriptions have been titrated to address chronic pain only. The contract is not intended to prevent or delay the management of acute pain by others. Moreover, it should not be assumed that the current prescriptions will be sufficient to address conditions resulting in acute pain. The reader is invited to consult with [place]'s Prescription Monitoring Program, as is the practice here, as an aid to responsible prescribing of controlled substances. Feel free to care for this patient as you feel is appropriate.   
 
 
 
 
 
Sincerely, 
 
 
HERBIE Rose

## 2019-11-05 NOTE — PROGRESS NOTES
Referred 3/23/17, source [address] - 10  Naloxone rescue - yes  Prophylactic bowel program - yes  Date of last OCA 5/13/19  Last UDS today, consistent POC, pending confirmatory testing  Prio UDS 5/13/19, consistent    date checked today, findings not- consistent as I do not see documentation that she called to disclose oxycodone 5mg #15 that was prescribed to her after her gastric surgery on 8/28/19      Today   Last Visit  Prior Visit(s)  [facility] - 5 & 7  5 & 4   5 & 4  [facility] - at least 38% 44%   42%  [facility] - 4  3   3      HPI:  Jaclyn Segovia is a 53 y.o. female here for f/u visit for ongoing evaluation of chronic lower back pain and widespread generalized pain. Pt was last seen here on 8/9/19. Pt denies interval changes on the character or distribution of pain. Pain is located at [facility] belt line, bilateral greater trochanter regions and bilateral feet. The pain is described as stabbing to aching with numbness in bilateral feet. Pain at its best is 5/10. Pain at its worse is 9/10. The pain is worsened by rainy weather, prolonged sitting, prolonged standing and prolonged walking. Symptoms are improved by Lyrica, Tylenol, Norco and capsaicin. Topamax for migraines. Pt has tried Cymbalta and Tramadol with no perceived benefit. Gabapentin with negative SE. Pt has never tried aquatic PT, massage therapy, yoga or chiropractor. Since last visit, she is s/p gastric bypass surgery with Dr Jasmyn Duvall on 8/27/19 and has been unable to take her Lyrica. She had a fall in October at work when she she slipped in the rain. She went to urgent care several times after the fall, for right hip and buttock pain. She states they did an xray, gave her flexeril and told her to follow up with pain management. Discussed with her that any new pain or chronic pain that has changed character or distribution needs full workup by PCP, orthopedics, urgent care or ED; pt states understanding.  Acute pain letter provider to her today. Discussed with her she needs to follow up with her PCP regarding the fall. Interventional Pain Procedures:  6/12/19 - right SIJ injection with Dr Hyun Lewis with significant and continued relief    ROS:  Reports recent fall. Denies fever, chills, nausea, vomiting, diarrhea, constipation, abdominal pain, chest pain, shortness or breath/trouble breathing, weakness, trouble swallowing, changes in vision, changes in hearing, dizziness, bladder incontinence, bowel incontinence, depression, anxiety, suicidal ideations, homicidal ideations or alcohol use. Opioid specific risk: diabetes, obesity, sleep disturbance, GERD. Vitals:    11/04/19 0752   BP: 137/88   Pulse: 73   Resp: 18   Temp: 98.6 °F (37 °C)   TempSrc: Oral   SpO2: 99%   Weight: 106.1 kg (234 lb)   Height: 5' 7\" (1.702 m)   PainSc:   6   PainLoc: Hip   LMP: 10/16/2019        Imaging:  Right Hip X-Ray 5/16/19  \"\"FINDINGS:  Orthopedic hardware plates and screws present at the right acetabulum, which obscures evaluation of the right hip. No convincing evidence for acute fracture or dislocation. Hypertrophic changes noted along the lateral aspect of the right acetabular roof. Mild enthesopathy noted adjacent to the greater trochanter. Left hip appears intact. Visualized portion of the bony pelvis and sacrum appear grossly intact. IMPRESSION:  No acute finding. Right acetabular hardware limits evaluation of the right hip. If there is high clinical concern for acute process, consider CT or MRI for further evaluation. \"\"      Physical exam:  Constitutional  -  AFVSS, no acute distress, endomorphic body habitus. A&OXs 3. Speech is clear and appropriate. HEENT -  Normocephalic, atraumatic. Conjugate gaze, clear sclerae. EOM intact. Neuro/Psych -  Mood is appropriate and patient is cooperative. Gait is within functional limits. Balance is within functional limits. Respiratory -  Non-labored breathing.  Even rise of chest wagner.      Primary Care Physician  [address] [facility],[address],  [place] [address]  [phone]      PHQ -- .  3 most recent PHQ Screens 11/4/2019   Little interest or pleasure in doing things Not at all   Feeling down, depressed, irritable, or hopeless Not at all   Total Score PHQ 2 0   Trouble falling or staying asleep, or sleeping too much -   Feeling tired or having little energy -   Poor appetite, weight loss, or overeating -   Feeling bad about yourself - or that you are a failure or have let yourself or your family down -   Trouble concentrating on things such as school, work, reading, or watching TV -   Moving or speaking so slowly that other people could have noticed; or the opposite being so fidgety that others notice -   Thoughts of being better off dead, or hurting yourself in some way -   PHQ 9 Score -   How difficult have these problems made it for you to do your work, take care of your home and get along with others -         Assessment/Plan:     ICD-10-CM ICD-9-CM    1. Trochanteric bursitis of both hips M70.61 726.5 HYDROcodone-acetaminophen (NORCO) 5-325 mg per tablet    M70.62  capsaicin (CAPZASIN-HP) 0.1 % topical cream      pregabalin (LYRICA) 20 mg/mL soln   2. Sacroiliitis (HCC) M46.1 720.2 HYDROcodone-acetaminophen (NORCO) 5-325 mg per tablet      capsaicin (CAPZASIN-HP) 0.1 % topical cream      pregabalin (LYRICA) 20 mg/mL soln   3. Neuropathic pain M79.2 729.2 HYDROcodone-acetaminophen (NORCO) 5-325 mg per tablet      capsaicin (CAPZASIN-HP) 0.1 % topical cream      pregabalin (LYRICA) 20 mg/mL soln   4. Chronic pain syndrome G89.4 338.4 HYDROcodone-acetaminophen (NORCO) 5-325 mg per tablet      capsaicin (CAPZASIN-HP) 0.1 % topical cream      pregabalin (LYRICA) 20 mg/mL soln   5. Encounter for long-term (current) use of high-risk medication Z79.899 V58.69 DRUG SCREEN      AMB POC DRUG SCREEN ()   6.  Chronic bilateral low back pain M54.5 724.2 HYDROcodone-acetaminophen (NORCO) 5-325 mg per tablet    G89.29 338.29 capsaicin (CAPZASIN-HP) 0.1 % topical cream      pregabalin (LYRICA) 20 mg/mL soln        1) Medications (opiod and non-opiod)  --I do not recommend long term opioid therapy for this patient at this time for their chronic pain; the risks outweigh the potential benefits. Pt currently taking Norco 5/325mg up to 2 times a day as needed with a total of 60 tabs to be used over 30 days. Their MME is 10. --Today, we will pause the weaning of patients opioid medication with a goal of being opioid free, pending safety and compliance. Pt was educated on signs and symptoms of opioid withdrawal and advised to call the clinic should these symptoms arise so that we may provide support as needed. --Pt instructed to call 5-7 days before they run out of their medications for refill. --At next office visit, the plan is to provide patient with Norco 5/325mg up to 2 times a day as needed with a total of 55 tabs to be budgeted over 30 days. If patient has difficulty with the wean or difficulty with cravings we will consider referral to mental health for ongoing assessment and treatment for opioid use disorder. --Hasn't been able to take Lyrica since her gastric bypass surgery; I will provide her with refill of liquid form as this was helping. Continue with upward titration as needed and as tolerated. Consider adding Elavil if needed after Lyrica has been optimized. --Continue topical capsaicin cream as needed. --Continue to optimize your Tylenol; pt can take a total of 3000mg of acetaminophen per 24 hour period from all sources. 2) Restorative Therapies  --Continue to incorporate piriformis stretching into your HEP.  --Pre-treat exacerbating activities with heat and post-treat with ice. --She has not obtained TENS as recommended. Adviser her she can obtain at low cost at local medical supply store or online.  I will order through [facility] and see if they can provider her with a free option. 3) Interventional Procedures  --Per Dr Kt García, \"if no hardware concerns [with right hip xray] are found we will consider bilateral greater trochanteric bursa injections as needed\". She would like to wait until until she follows up with her PCP regarding recent fall. 4) Behavorial Health Approaches  --Pt would benefit from referral to pain psychology for training and cognitive behavorial therapy, acceptance commitment therapy, biofeedback and mindfulness mediation and other modalities as indicated for treatment of chronic pain once this service is available at our clinic or by referral with Dr Domitila Bruno or Dr Sienna Hickey in the future. 5) Complementary & Integrative Health  --Close follow up PCP and weight loss surgeon Dr Marga Gitelman. --Follow up ongoing assessment and ongoing development of integrative and comprehensive plan of care for chronic pain. Goals: To establish complementary and integrative plan of care to address chronic pain issues while minimizing pharmaceuticals to maximize patient's function improve quality of life. Education:  Patient again educated on the importance of strict compliance with the opioid care agreement while on opioid therapy. Patient also again educated that they should avoid driving while on chronic opioid therapy. Also advised to avoid alcohol and to avoid benzodiazepines while on opioid therapy. Handouts given regarding opioid safety. Follow-up and Dispositions    · Return in about 3 months (around 2/4/2020) for 30 min.               Social History     Socioeconomic History    Marital status:      Spouse name: Not on file    Number of children: Not on file    Years of education: Not on file    Highest education level: Not on file   Occupational History    Not on file   Social Needs    Financial resource strain: Not on file    Food insecurity:     Worry: Not on file     Inability: Not on file   [employer] needs:      Medical: Not on file     Non-medical: Not on file   Tobacco Use    Smoking status: Former Smoker     Last attempt to quit: 2016     Years since quitting: 3.7    Smokeless tobacco: Never Used   Substance and Sexual Activity    Alcohol use: No    Drug use: No    Sexual activity: Not on file   Lifestyle    Physical activity:     Days per week: Not on file     Minutes per session: Not on file    Stress: Not on file   Relationships    Social connections:     Talks on phone: Not on file     Gets together: Not on file     Attends [employer] service: Not on file     Active member of club or organization: Not on file     Attends meetings of clubs or organizations: Not on file     Relationship status: Not on file    Intimate partner violence:     Fear of current or ex partner: Not on file     Emotionally abused: Not on file     Physically abused: Not on file     Forced sexual activity: Not on file   Other Topics Concern    Not on file   Social History Narrative    Not on file     Family History   Problem Relation Age of Onset    Hypertension Mother     Diabetes Mother     Hypertension Father     Diabetes Father      Allergies   Allergen Reactions    Aspirin Other (comments)     Chest pain    Darvocet A500 [Propoxyphene N-Acetaminophen] Hives    Pcn [Penicillins] Hives    Sulfur Hives    Sulfa (Sulfonamide Antibiotics) Hives     Past Medical History:   Diagnosis Date    Diabetes ([facility] [address]     Essential hypertension     Hiatal hernia     Migraines     Neurological disorder     migraines    Neuropathy      Past Surgical History:   Procedure Laterality Date    HX  SECTION      c section    HX CHOLECYSTECTOMY      HX GASTRIC BYPASS  2019    HX ORTHOPAEDIC  1996    pelvic repair     Current Outpatient Medications on File Prior to Visit   Medication Sig    cyclobenzaprine (FLEXERIL) 10 mg tablet TAKE 1 TABLET BY MOUTH EVERY 8 HOURS AS NEEDED FOR MUSCLE SPASM    multivitamin with iron (FLINTSTONES) chewable tablet Take 1 Tab by mouth two (2) times a day.  ferrous sulfate (IRON) 325 mg (65 mg iron) tablet Take  by mouth Daily (before breakfast).  Calcium Citrate-Vitamin D3 500 mg calcium -400 unit chew Take 1 Tab by mouth daily.  cholecalciferol, VITAMIN D3, (VITAMIN D3) 5,000 unit tab tablet Take 5,000 Units by mouth daily.  cyanocobalamin (VITAMIN B-12) 1,000 mcg tablet Take 1,000 mcg by mouth daily.  thiamine HCL (VITAMIN B-1) 100 mg tablet Take  by mouth daily.  hydrOXYzine pamoate (VISTARIL) 50 mg capsule Take 25 mg by mouth three (3) times daily as needed for Itching.  topiramate (TOPAMAX) 100 mg tablet Take  by mouth two (2) times a day.  sucralfate (CARAFATE) 1 gram tablet sucralfate 1 gram tablet   Take 1 tablet 4 times a day by oral route for 30 days.  insulin glargine,hum.rec.anlog (LANTUS SC) 60 Units by SubCUTAneous route daily.  pantoprazole (PROTONIX) 40 mg tablet Take 40 mg by mouth daily.  senna (SENNA) 8.6 mg tablet Take 1 Tab by mouth daily as needed for Constipation.  dicyclomine (BENTYL) 20 mg tablet Take 20 mg by mouth every eight (8) hours. No current facility-administered medications on file prior to visit. [address] was used for portions of this report. Unintended errors may occur.

## 2019-11-08 ENCOUNTER — DOCUMENTATION ONLY (OUTPATIENT)
Dept: PAIN MANAGEMENT | Age: 44
End: 2019-11-08

## 2019-11-08 NOTE — PROGRESS NOTES
The pt's order for [facility], last office note and demographics were faxed over to [facility] for review. A fax confirmation was received and they will contact the pt.

## 2019-12-06 ENCOUNTER — HOSPITAL ENCOUNTER (OUTPATIENT)
Dept: LAB | Age: 44
Discharge: HOME | End: 2019-12-06

## 2019-12-06 LAB — SENTARA SPECIMEN COL,SENBCF: NORMAL

## 2019-12-06 PROCEDURE — 99001 SPECIMEN HANDLING PT-LAB: CPT

## 2019-12-07 LAB
25(OH)D3 SERPL-MCNC: 54.9 NG/ML (ref 32–100)
A-G RATIO,AGRAT: 1.5 RATIO (ref 1.1–2.6)
ABSOLUTE LYMPHOCYTE COUNT, 10803: 3.2 K/UL (ref 1–4.8)
ALBUMIN SERPL-MCNC: 4.1 G/DL (ref 3.5–5)
ALBUMIN SERPL-MCNC: 4.1 G/DL (ref 3.5–5)
ALP SERPL-CCNC: 102 U/L (ref 25–115)
ALT SERPL-CCNC: 17 U/L (ref 5–40)
ANION GAP SERPL CALC-SCNC: 14 MMOL/L
AST SERPL W P-5'-P-CCNC: 21 U/L (ref 10–37)
BASOPHILS # BLD: 0 K/UL (ref 0–0.2)
BASOPHILS NFR BLD: 1 % (ref 0–2)
BILIRUB SERPL-MCNC: 0.3 MG/DL (ref 0.2–1.2)
BUN SERPL-MCNC: 9 MG/DL (ref 6–22)
CALCIUM SERPL-MCNC: 10.1 MG/DL (ref 8.4–10.5)
CHLORIDE SERPL-SCNC: 102 MMOL/L (ref 98–110)
CO2 SERPL-SCNC: 24 MMOL/L (ref 20–32)
CREAT SERPL-MCNC: 0.7 MG/DL (ref 0.5–1.2)
EOSINOPHIL # BLD: 0.1 K/UL (ref 0–0.5)
EOSINOPHIL NFR BLD: 1 % (ref 0–6)
ERYTHROCYTE [DISTWIDTH] IN BLOOD BY AUTOMATED COUNT: 16.9 % (ref 10–15.5)
FERRITIN SERPL-MCNC: 16 NG/ML (ref 10–291)
FOLATE,FOL: >20 NG/ML
GFRAA, 66117: >60
GFRNA, 66118: >60
GLOBULIN,GLOB: 2.8 G/DL (ref 2–4)
GLUCOSE SERPL-MCNC: 93 MG/DL (ref 70–99)
GRANULOCYTES,GRANS: 37 % (ref 40–75)
HCT VFR BLD AUTO: 38.4 % (ref 35.1–48)
HGB BLD-MCNC: 11.7 G/DL (ref 11.7–16)
IRON,IRN: 43 MCG/DL (ref 30–160)
LYMPHOCYTES, LYMLT: 57 % (ref 20–45)
MCH RBC QN AUTO: 25 PG (ref 26–34)
MCHC RBC AUTO-ENTMCNC: 31 G/DL (ref 31–36)
MCV RBC AUTO: 83 FL (ref 81–99)
MONOCYTES # BLD: 0.3 K/UL (ref 0.1–1)
MONOCYTES NFR BLD: 5 % (ref 3–12)
NEUTROPHILS # BLD AUTO: 2.1 K/UL (ref 1.8–7.7)
PLATELET # BLD AUTO: 391 K/UL (ref 140–440)
PMV BLD AUTO: 9.5 FL (ref 9–13)
POTASSIUM SERPL-SCNC: 3.9 MMOL/L (ref 3.5–5.5)
PROT SERPL-MCNC: 6.9 G/DL (ref 6.4–8.3)
RBC # BLD AUTO: 4.64 M/UL (ref 3.8–5.2)
SODIUM SERPL-SCNC: 140 MMOL/L (ref 133–145)
VIT B12 SERPL-MCNC: 902 PG/ML (ref 211–911)
WBC # BLD AUTO: 5.7 K/UL (ref 4–11)

## 2019-12-11 ENCOUNTER — DOCUMENTATION ONLY (OUTPATIENT)
Dept: PAIN MANAGEMENT | Age: 44
End: 2019-12-11

## 2019-12-11 DIAGNOSIS — M46.1 SACROILIITIS (HCC): Primary | ICD-10-CM

## 2019-12-11 DIAGNOSIS — M70.62 TROCHANTERIC BURSITIS OF BOTH HIPS: ICD-10-CM

## 2019-12-11 DIAGNOSIS — M70.61 TROCHANTERIC BURSITIS OF BOTH HIPS: ICD-10-CM

## 2019-12-11 LAB — VITAMIN B1, WHOLE BLOOD, 66250: 120.9 NMOL/L (ref 66.5–200)

## 2019-12-11 RX ORDER — HYDROCODONE BITARTRATE AND ACETAMINOPHEN 5; 325 MG/1; MG/1
1 TABLET ORAL
Qty: 60 TAB | Refills: 0 | Status: SHIPPED | OUTPATIENT
Start: 2019-12-11 | End: 2020-01-10

## 2019-12-11 NOTE — TELEPHONE ENCOUNTER
Marily Herron has called requesting a refill of their controlled medication, norco, for the management of her chronic back and hip pain. Last office visit date: 11/04/19  Last opioid care agreement 06/12/19  Last UDS was done 11/04/19    Date last  was pulled and reviewed : 12/11/19  Last fill date for medication was 11/07/19 for norco 5/325 mg #60    Was the patient compliant when the above report was pulled? yes    Analgesia: pt reports a 60-70% pain relief with her current opioid medication regimen    Aberrancies: none noted     ADL's: pt reports that she is able to perform her normal daily tasks because she is taking her medication. Adverse Reaction: none reported by the pt at this time. Provider's last note and plan of care reviewed? yes  Request forwarded to provider for review.

## 2019-12-11 NOTE — TELEPHONE ENCOUNTER
I called and spoke to the pt at work. The pt was identified using 2 pt identifiers. She was informed of the prescription being done and ready for . The pt was asked to come in to the office no later than 3 to speak to the provider. She states that she works until 5 everyday and usually sends someone to pick It up for her. Message will be routed to provider to see if he can call her on the phone to talk about the office closure. The pt is aware of this and has the list and release of information.

## 2019-12-11 NOTE — PROGRESS NOTES
Today patient was contacted via telephone. She will be provided with Norco 5/325 up to twice daily 60 tablets for 30 days. They were informed of the planned clinic closure of 12/12/19 and that this will be the last prescription of narcotic provided from this clinic. Patient was educated on signs and symptoms of opioid withdrawal but opioid withdrawal is highly unlikely at her current narcotic dosages. They were also advised they can go to the nearest ED for further support if needed. She wishes to consult with her primary care provider before choosing her next potential pain clinic, therefore no new referral was provided and she will obtain this from her PCP at a later date. They were advised to call their primary care provider to inform them of the planned clinic closure so that they may anticipate providing support for her pain while awaiting establishment at the new clinic. Patient expresses understanding and agreement with the plan.

## 2019-12-12 ENCOUNTER — OFFICE VISIT (OUTPATIENT)
Dept: SURGERY | Age: 44
End: 2019-12-12

## 2019-12-12 VITALS
DIASTOLIC BLOOD PRESSURE: 70 MMHG | RESPIRATION RATE: 16 BRPM | WEIGHT: 225 LBS | HEART RATE: 84 BPM | BODY MASS INDEX: 35.31 KG/M2 | OXYGEN SATURATION: 98 % | HEIGHT: 67 IN | SYSTOLIC BLOOD PRESSURE: 108 MMHG

## 2019-12-12 DIAGNOSIS — K91.2 POSTSURGICAL MALABSORPTION: ICD-10-CM

## 2019-12-12 DIAGNOSIS — E66.9 CLASS 2 OBESITY WITHOUT SERIOUS COMORBIDITY WITH BODY MASS INDEX (BMI) OF 35.0 TO 35.9 IN ADULT, UNSPECIFIED OBESITY TYPE: ICD-10-CM

## 2019-12-12 DIAGNOSIS — K91.2 POSTSURGICAL MALABSORPTION: Primary | ICD-10-CM

## 2019-12-12 DIAGNOSIS — Z98.84 HISTORY OF ROUX-EN-Y GASTRIC BYPASS: ICD-10-CM

## 2019-12-12 RX ORDER — HYDROCHLOROTHIAZIDE 25 MG/1
25 TABLET ORAL DAILY
COMMUNITY
End: 2019-12-12

## 2019-12-12 RX ORDER — PREGABALIN 20 MG/ML
SOLUTION ORAL
COMMUNITY
End: 2021-10-30

## 2019-12-12 RX ORDER — LOSARTAN POTASSIUM 25 MG/1
TABLET ORAL DAILY
COMMUNITY
End: 2021-10-30

## 2019-12-12 NOTE — PROGRESS NOTES
Mili Hardy is a 53 y.o. female is now 3 months status post laparoscopic gastric bypass surgery. Doing well overall. Currently on a bariatric stage 4 diet without difficulty. Taking in 56-64 oz water, >45 g protein. 30 min of activity 3 days a week. Bowel movements are regular. The patient is not having any pain. . She is compliant with multivitamins, calcium, Vit D and B1 & 12 supplements. Patient denies use of NSAIDs, ETOH, carbonated beverages or use of straws. Weight Loss Metrics 2019   Pre op / Initial Wt 270 - - 270 - - -   Today's Wt - 225 lb 234 lb - 260 lb 12.8 oz 270 lb 8.1 oz 269 lb   BMI - 35.24 kg/m2 36.65 kg/m2 - 40.85 kg/m2 39.95 kg/m2 42.13 kg/m2   Ideal Body Wt 140 - - 140 - - -   Excess Body Wt 130 - - 130 - - -   Goal Wt 166 - - - - - -   Wt loss to date 45 - - 9.2 - - -   % Wt Loss 0.43 - - 0.09 - - -   80%  - - 104 - - -       Body mass index is 35.24 kg/m². Comorbidities:    Hypertension: improved  Diabetes: resolved  Obstructive Sleep Apnea: not applicable  Hyperlipidemia: not applicable  Stress Urinary Incontinence: not applicable  Gastroesophageal Reflux: resolved  Weight related arthropathy: not applicable        Past Medical History:   Diagnosis Date    Diabetes ([facility] [address]     Essential hypertension     Hiatal hernia     Migraines     Neurological disorder     migraines    Neuropathy        Past Surgical History:   Procedure Laterality Date    HX  SECTION      c section    HX CHOLECYSTECTOMY      HX GASTRIC BYPASS  2019    HX ORTHOPAEDIC  1996    pelvic repair       Current Outpatient Medications   Medication Sig Dispense Refill    losartan (COZAAR) 25 mg tablet Take  by mouth daily.  pregabalin (LYRICA) 20 mg/mL soln Take  by mouth.       HYDROcodone-acetaminophen (NORCO) 5-325 mg per tablet Take 1 Tab by mouth two (2) times daily as needed for Pain for up to 30 days. Max Daily Amount: 2 Tabs. 60 Tab 0    multivitamin with iron (FLINTSTONES) chewable tablet Take 1 Tab by mouth two (2) times a day.  ferrous sulfate (IRON) 325 mg (65 mg iron) tablet Take  by mouth Daily (before breakfast).  Calcium Citrate-Vitamin D3 500 mg calcium -400 unit chew Take 1 Tab by mouth daily.  cholecalciferol, VITAMIN D3, (VITAMIN D3) 5,000 unit tab tablet Take 5,000 Units by mouth daily.  cyanocobalamin (VITAMIN B-12) 1,000 mcg tablet Take 1,000 mcg by mouth daily.  thiamine HCL (VITAMIN B-1) 100 mg tablet Take  by mouth daily.  hydrOXYzine pamoate (VISTARIL) 50 mg capsule Take 25 mg by mouth three (3) times daily as needed for Itching.  topiramate (TOPAMAX) 100 mg tablet Take  by mouth two (2) times a day.          Allergies   Allergen Reactions    Aspirin Other (comments)     Chest pain    Darvocet A500 [Propoxyphene N-Acetaminophen] Hives    Pcn [Penicillins] Hives    Sulfur Hives    Sulfa (Sulfonamide Antibiotics) Hives       ROS:  Positive in BOLD  CONST: Fever, weight loss, fatigue or chills  GI: Nausea, vomiting, abdominal pain, change in bowel habits, hematochezia, melena, and GERD   INTEG: Dermatitis, abnormal moles  HEENT: Recent changes in vision, vertigo, epistaxis, dysphagia and hoarseness  CV: Chest pain, palpitations, HTN, edema and varicosities  RESP: Cough, shortness of breath, wheezing, hemoptysis, snoring and reactive airway disease  : Hematuria, dysuria, frequency, urgency, nocturia and stress urinary incontinence   MS: Weakness, joint pain and arthritis  ENDO: Diabetes, thyroid disease, polyuria, polydipsia, polyphagia, poor wound healing, heat intolerance, cold intolerance  LYMPH/HEME: Anemia, bruising and history of blood transfusions  NEURO: Dizziness, migraines, fainting, seizures and stroke  PSYCH: Anxiety and depression      Physicial Exam:  Visit Vitals  /70   Pulse 84   Resp 16   Ht 5' 7\" (1.702 m)   Wt 102.1 kg (225 lb)   SpO2 98%   BMI 35.24 kg/m²     General: 53 y.o.) female in no acute distress. HEENT: Normocephalic, atraumatic, Pupils equal and reactive, nasopharynx clear, oropharynx clear and moist without lesions  NECK: Supple, no lymphadenopathy, thyromegaly, carotid bruits or jugular venous distension. trachea midline  RESP: Clear to auscultation bilaterally, no wheezes, rhonchi, or rales, normal respiratory excursion  CV: Regular rate and rhythm, no murmurs, rubs or gallops. 3+/4 pulses in bilateral dorsalis pedis and posterior tibialis. No distal edema or varicosities. ABD: Soft, nontender, nondistended, normoactive bowel sounds, no hernias, no hepatosplenomegaly  Extremities: Warm, well perfused, no tenderness or swelling, normal gait/station  Neuro: Sensation and strength grossly intact and symmetrical  Psych: Alert and oriented to person, place, and time. Labs: reviewed    Assessment/Plan: Pt is currently 3 months s/p laparoscopic gastric bypass surgery with a total weight loss of 45 lbs to date, doing well  Labs were reviewed with patient. Patient was instructed to continue multivitamins, calcium, Vit D and B1 & 12 supplements  Stressed importance of hydration with SF clear liquids until urine clear. OK to advance diet as directed in bariatric manual with food content mainly meats/veggies. Encouraged support group attendance  Advised exercise program of 30 minutes daily             >50% of 25 minute visit spent face to face time with Sherlyn consisted of counseling & coordinating and/or discussing treatment plans in reference to her The primary encounter diagnosis was Postsurgical malabsorption. Diagnoses of History of Justin-en-Y gastric bypass, Class 2 obesity without serious comorbidity with body mass index (BMI) of 35.0 to 35.9 in adult, unspecified obesity type, and BMI 35.0-35.9,adult were also pertinent to this visit.         Elizabeth Davidson, PETEP-BC

## 2019-12-12 NOTE — PROGRESS NOTES
Chief Complaint   Patient presents with    Follow-up     GBP 8/27/19     Pt ID confirmed    Weight Loss Metrics 12/12/2019 12/12/2019 11/4/2019 9/12/2019 9/12/2019 8/27/2019 8/9/2019   Pre op / Initial Wt 270 - - 270 - - -   Today's Wt - 225 lb 234 lb - 260 lb 12.8 oz 270 lb 8.1 oz 269 lb   BMI - 35.24 kg/m2 36.65 kg/m2 - 40.85 kg/m2 39.95 kg/m2 42.13 kg/m2   Ideal Body Wt 140 - - 140 - - -   Excess Body Wt 130 - - 130 - - -   Goal Wt 166 - - - - - -   Wt loss to date 45 - - 9.2 - - -   % Wt Loss 0.43 - - 0.09 - - -   80%  - - 104 - - -       Body mass index is 35.24 kg/m².     Post op medications:  MVI: x2day  Calcium Citrate: 1500mg  Iron 65mg  B-12 1000mcg  Vit D 3 5000units

## 2020-02-18 ENCOUNTER — HOSPITAL ENCOUNTER (EMERGENCY)
Age: 45
Discharge: HOME | End: 2020-02-18
Attending: EMERGENCY MEDICINE
Payer: MEDICAID

## 2020-02-18 VITALS
HEART RATE: 75 BPM | RESPIRATION RATE: 16 BRPM | SYSTOLIC BLOOD PRESSURE: 135 MMHG | DIASTOLIC BLOOD PRESSURE: 72 MMHG | TEMPERATURE: 98.6 F | WEIGHT: 219 LBS | OXYGEN SATURATION: 99 % | HEIGHT: 68 IN | BODY MASS INDEX: 33.19 KG/M2

## 2020-02-18 DIAGNOSIS — R51.9 NONINTRACTABLE EPISODIC HEADACHE, UNSPECIFIED HEADACHE TYPE: Primary | ICD-10-CM

## 2020-02-18 PROCEDURE — 96374 THER/PROPH/DIAG INJ IV PUSH: CPT

## 2020-02-18 PROCEDURE — 74011250636 HC RX REV CODE- 250/636: Performed by: EMERGENCY MEDICINE

## 2020-02-18 PROCEDURE — 96361 HYDRATE IV INFUSION ADD-ON: CPT

## 2020-02-18 PROCEDURE — 99283 EMERGENCY DEPT VISIT LOW MDM: CPT

## 2020-02-18 PROCEDURE — 96375 TX/PRO/DX INJ NEW DRUG ADDON: CPT

## 2020-02-18 RX ORDER — METOCLOPRAMIDE HYDROCHLORIDE 5 MG/ML
10 INJECTION INTRAMUSCULAR; INTRAVENOUS
Status: COMPLETED | OUTPATIENT
Start: 2020-02-18 | End: 2020-02-18

## 2020-02-18 RX ORDER — HYDROXYZINE 50 MG/1
TABLET, FILM COATED ORAL
COMMUNITY

## 2020-02-18 RX ORDER — SUMATRIPTAN 50 MG/1
TABLET, FILM COATED ORAL
COMMUNITY
End: 2021-10-30

## 2020-02-18 RX ORDER — DIPHENHYDRAMINE HYDROCHLORIDE 50 MG/ML
50 INJECTION, SOLUTION INTRAMUSCULAR; INTRAVENOUS ONCE
Status: COMPLETED | OUTPATIENT
Start: 2020-02-18 | End: 2020-02-18

## 2020-02-18 RX ORDER — KETOROLAC TROMETHAMINE 15 MG/ML
15 INJECTION, SOLUTION INTRAMUSCULAR; INTRAVENOUS
Status: COMPLETED | OUTPATIENT
Start: 2020-02-18 | End: 2020-02-18

## 2020-02-18 RX ORDER — HYDROCODONE BITARTRATE AND ACETAMINOPHEN 5; 325 MG/1; MG/1
TABLET ORAL
COMMUNITY
End: 2021-01-09

## 2020-02-18 RX ADMIN — KETOROLAC TROMETHAMINE 15 MG: 15 INJECTION, SOLUTION INTRAMUSCULAR; INTRAVENOUS at 19:36

## 2020-02-18 RX ADMIN — DIPHENHYDRAMINE HYDROCHLORIDE 50 MG: 50 INJECTION INTRAMUSCULAR; INTRAVENOUS at 19:37

## 2020-02-18 RX ADMIN — METOCLOPRAMIDE 10 MG: 5 INJECTION, SOLUTION INTRAMUSCULAR; INTRAVENOUS at 19:39

## 2020-02-18 RX ADMIN — SODIUM CHLORIDE 1000 ML: 900 INJECTION, SOLUTION INTRAVENOUS at 19:35

## 2020-02-18 NOTE — ED TRIAGE NOTES
C/O migraine x 2 weeks and right leg pain x 3 days. Pt reports hx of migraines however reports no relief from meds.

## 2020-02-19 NOTE — DISCHARGE INSTRUCTIONS

## 2020-02-19 NOTE — ED PROVIDER NOTES
EMERGENCY DEPARTMENT HISTORY AND PHYSICAL EXAM    Date: 2/18/2020  Patient Name: Flaco Torres    History of Presenting Illness     Chief Complaint   Patient presents with    Headache    Leg Pain         History Provided By: Patient    Additional History (Context): Flaco Torres is a 53 y.o. female with migraines who presents with an intermittent but persistent headache now for a month. She has been followed by her neurologist and her primary care provider. Has been nauseous and vomiting. Denies recent head trauma fever nuchal rigidity weakness or numbness changes in speech or vision. Additionally, she wants to have her right leg evaluated for pain in the anteromedial aspect of her mid lower leg on the right. Denies prior history of DVT or hormone use recent surgery fractures immobilizations travel. Denies any shortness of breath. She is on her menstrual cycle now. PCP: Ada Cm MD    Current Outpatient Medications   Medication Sig Dispense Refill    SUMAtriptan (IMITREX) 50 mg tablet Imitrex 50 mg tablet   Take 1 tablet as needed by oral route.  HYDROcodone-acetaminophen (NORCO) 5-325 mg per tablet hydrocodone 5 mg-acetaminophen 325 mg tablet   Take 1 tablet 3 times a day by oral route as needed.  hydroxyzine HCL (ATARAX) 50 mg tablet hydroxyzine HCl 50 mg tablet      losartan (COZAAR) 25 mg tablet Take  by mouth daily.  pregabalin (LYRICA) 20 mg/mL soln Take  by mouth.  multivitamin with iron (FLINTSTONES) chewable tablet Take 1 Tab by mouth two (2) times a day.  ferrous sulfate (IRON) 325 mg (65 mg iron) tablet Take  by mouth Daily (before breakfast).  Calcium Citrate-Vitamin D3 500 mg calcium -400 unit chew Take 1 Tab by mouth daily.  cholecalciferol, VITAMIN D3, (VITAMIN D3) 5,000 unit tab tablet Take 5,000 Units by mouth daily.  cyanocobalamin (VITAMIN B-12) 1,000 mcg tablet Take 1,000 mcg by mouth daily.       thiamine HCL (VITAMIN B-1) 100 mg tablet Take  by mouth daily.  hydrOXYzine pamoate (VISTARIL) 50 mg capsule Take 25 mg by mouth three (3) times daily as needed for Itching.  topiramate (TOPAMAX) 100 mg tablet Take  by mouth two (2) times a day. Past History     Past Medical History:  Past Medical History:   Diagnosis Date    Diabetes ([address]     Essential hypertension     Hiatal hernia     Migraines     Neurological disorder     migraines    Neuropathy        Past Surgical History:  Past Surgical History:   Procedure Laterality Date    HX  SECTION      c section    HX CHOLECYSTECTOMY      HX GASTRIC BYPASS  2019    HX ORTHOPAEDIC  1996    pelvic repair       Family History:  Family History   Problem Relation Age of Onset    Hypertension Mother     Diabetes Mother     Hypertension Father     Diabetes Father        Social History:  Social History     Tobacco Use    Smoking status: Former Smoker     Last attempt to quit: 2016     Years since quittin.0    Smokeless tobacco: Never Used   Substance Use Topics    Alcohol use: No    Drug use: No       Allergies: Allergies   Allergen Reactions    Aspirin Other (comments)     Chest pain    Darvocet A500 [Propoxyphene N-Acetaminophen] Hives    Pcn [Penicillins] Hives    Sulfur Hives    Sulfa (Sulfonamide Antibiotics) Hives         Review of Systems   Review of Systems   Constitutional: Negative for fever. Respiratory: Negative for shortness of breath. Cardiovascular: Negative for chest pain and leg swelling. Gastrointestinal: Positive for nausea and vomiting. Musculoskeletal: Positive for myalgias. Neurological: Positive for headaches. All Other Systems Negative  Physical Exam     Vitals:    20 1833   BP: 137/78   Pulse: 78   Resp: 16   Temp: 98.6 °F (37 °C)   SpO2: 100%   Weight: 99.3 kg (219 lb)   Height: 5' 8\" (1.727 m)     Physical Exam  Vitals signs and nursing note reviewed.    Constitutional:       Appearance: She is well-developed. HENT:      Head: Normocephalic and atraumatic. Eyes:      Extraocular Movements: Extraocular movements intact. Pupils: Pupils are equal, round, and reactive to light. Comments: No nystagmus. Neck:      Thyroid: No thyromegaly. Vascular: No JVD. Trachea: No tracheal deviation. Cardiovascular:      Rate and Rhythm: Normal rate and regular rhythm. Heart sounds: Normal heart sounds. No murmur. No friction rub. No gallop. Pulmonary:      Effort: Pulmonary effort is normal. No respiratory distress. Breath sounds: Normal breath sounds. No stridor. No wheezing or rales. Chest:      Chest wall: No tenderness. Abdominal:      General: There is no distension. Palpations: Abdomen is soft. There is no mass. Tenderness: There is no abdominal tenderness. There is no guarding or rebound. Musculoskeletal:         General: No tenderness. Lymphadenopathy:      Cervical: No cervical adenopathy. Skin:     General: Skin is warm and dry. Coloration: Skin is not pale. Findings: No erythema or rash. Neurological:      Mental Status: She is alert and oriented to person, place, and time. Comments: Negative Romberg. No dysdiadochokinesis, past pointing, tremor. Normal heel-shin. Psychiatric:         Behavior: Behavior normal.         Thought Content: Thought content normal.            Diagnostic Study Results     Labs -   No results found for this or any previous visit (from the past 12 hour(s)). Radiologic Studies -   No orders to display     CT Results  (Last 48 hours)    None        CXR Results  (Last 48 hours)    None            Medical Decision Making   I am the first provider for this patient. I reviewed the vital signs, available nursing notes, past medical history, past surgical history, family history and social history. Vital Signs-Reviewed the patient's vital signs.       Records Reviewed: Nursing Notes and Old Medical Records    Procedures:  Procedures    Provider Notes (Medical Decision Making):   Treat HA.    7:58 PM  Headache is resolving and feels much better. Plan for discharge after she finishes her fluids. MED RECONCILIATION:  No current facility-administered medications for this encounter. Current Outpatient Medications   Medication Sig    SUMAtriptan (IMITREX) 50 mg tablet Imitrex 50 mg tablet   Take 1 tablet as needed by oral route.  HYDROcodone-acetaminophen (NORCO) 5-325 mg per tablet hydrocodone 5 mg-acetaminophen 325 mg tablet   Take 1 tablet 3 times a day by oral route as needed.  hydroxyzine HCL (ATARAX) 50 mg tablet hydroxyzine HCl 50 mg tablet    losartan (COZAAR) 25 mg tablet Take  by mouth daily.  pregabalin (LYRICA) 20 mg/mL soln Take  by mouth.  multivitamin with iron (FLINTSTONES) chewable tablet Take 1 Tab by mouth two (2) times a day.  ferrous sulfate (IRON) 325 mg (65 mg iron) tablet Take  by mouth Daily (before breakfast).  Calcium Citrate-Vitamin D3 500 mg calcium -400 unit chew Take 1 Tab by mouth daily.  cholecalciferol, VITAMIN D3, (VITAMIN D3) 5,000 unit tab tablet Take 5,000 Units by mouth daily.  cyanocobalamin (VITAMIN B-12) 1,000 mcg tablet Take 1,000 mcg by mouth daily.  thiamine HCL (VITAMIN B-1) 100 mg tablet Take  by mouth daily.  hydrOXYzine pamoate (VISTARIL) 50 mg capsule Take 25 mg by mouth three (3) times daily as needed for Itching.  topiramate (TOPAMAX) 100 mg tablet Take  by mouth two (2) times a day. Disposition:  home    DISCHARGE NOTE:   7:58 PM    Pt has been reexamined. Patient has no new complaints, changes, or physical findings. Care plan outlined and precautions discussed. Results of exam were reviewed with the patient. All medications were reviewed with the patient. All of pt's questions and concerns were addressed.  Patient was instructed and agrees to follow up with PCP, neurology, as well as to return to the ED upon further deterioration. Patient is ready to go home. Follow-up Information     Follow up With Specialties Details Why Contact Info    Carli Brooks., MD Pediatrics Schedule an appointment as soon as possible for a visit in 1 day  [facility] [address] [facility] EMERGENCY DEPT Emergency Medicine  If symptoms worsen return immediately [address] [facility]  [phone]          Current Discharge Medication List            Diagnosis     Clinical Impression:   1.  Nonintractable episodic headache, unspecified headache type

## 2020-02-19 NOTE — ROUTINE PROCESS
Julia Hutton is a 53 y.o. female that was discharged in stable. Pt was accompanied by friend. Pt is not driving. The patients diagnosis, condition and treatment were explained to  patient and aftercare instructions were given. The patient verbalized understanding. Patient armband removed and shredded.

## 2020-05-12 ENCOUNTER — DOCUMENTATION ONLY (OUTPATIENT)
Dept: BARIATRICS/WEIGHT MGMT | Age: 45
End: 2020-05-12

## 2020-05-12 NOTE — PROGRESS NOTES
5/12/20:  Patient was contacted regarding her 9 month post op appointment. Patient was given the option to postpone until we are able to have classes again or reschedule to a 1-1 phone appointment with me sometime in May. Patient stated she preferred to wait classes resume to meet with me.     Damion Stanton MS RD

## 2020-07-13 ENCOUNTER — DOCUMENTATION ONLY (OUTPATIENT)
Dept: SURGERY | Age: 45
End: 2020-07-13

## 2020-07-13 NOTE — PROGRESS NOTES
Per [facility] requirements;  E-mail and letter sent for follow up appointment. [facility] Surgical Specialist  Carmen Najera. [address] [facility] Weight Loss [place]   [facility]  [facility]        Dear Patient,        Your health is our main concern. It is important for your health to have follow-up lab work and to see your surgeon at 3 months, 6 months and annually after your weight loss surgery. Additionally, the Department of Bariatric Surgery at our hospital is a member of the [employer] of [address] [place] [address] Surgical Quality Improvement Program ([facility] NSQIP). As a participant in this program, we gather information on the outcomes of our patients after surgery. Please call the office for a follow up appointment at [phone]. If you have moved out of the area or have changed surgeons please call us and let us know the name of your doctor. Your health and feedback are important to us. We greatly appreciate your response.        Thank you,  [facility] Loss [address] [facility]

## 2020-07-13 NOTE — LETTER
[employer] Surgical Specialist 
[place]EVER[place]/Kiko Saininighat [place]. [address] [facility], [address] [place] [address] [facility] [employer] Surgical Specialists [facility] Dear Patient, Your health is our main concern. It is important for your health to have follow-up lab work and to see your surgeon at 3 months, 6 months and annually after your weight loss surgery. Additionally, the Department of Bariatric Surgery at our hospital is a member of the [employer] [address] Surgical Quality Improvement Program ([facility] NSQIP). As a participant in this program, we gather information on the outcomes of our patients after surgery. Please call the office for a follow up appointment at [phone]. If you have moved out of the area or have changed surgeons please call us and let us know the name of your doctor. Your health and feedback are important to us. We greatly appreciate your response. Thank you, [facility] Loss [facility]

## 2021-01-09 ENCOUNTER — HOSPITAL ENCOUNTER (EMERGENCY)
Age: 46
Discharge: HOME | End: 2021-01-09
Attending: EMERGENCY MEDICINE
Payer: COMMERCIAL

## 2021-01-09 VITALS
OXYGEN SATURATION: 100 % | HEIGHT: 69 IN | SYSTOLIC BLOOD PRESSURE: 158 MMHG | BODY MASS INDEX: 35.84 KG/M2 | TEMPERATURE: 98.2 F | WEIGHT: 242 LBS | DIASTOLIC BLOOD PRESSURE: 98 MMHG | RESPIRATION RATE: 16 BRPM | HEART RATE: 110 BPM

## 2021-01-09 DIAGNOSIS — N93.9 VAGINAL BLEEDING: Primary | ICD-10-CM

## 2021-01-09 LAB
ALBUMIN SERPL-MCNC: 3.1 G/DL (ref 3.4–5)
ALBUMIN/GLOB SERPL: 0.7 {RATIO} (ref 0.8–1.7)
ALP SERPL-CCNC: 124 U/L (ref 45–117)
ALT SERPL-CCNC: 23 U/L (ref 13–56)
ANION GAP SERPL CALC-SCNC: 7 MMOL/L (ref 3–18)
APPEARANCE UR: CLEAR
AST SERPL-CCNC: 18 U/L (ref 10–38)
BASOPHILS # BLD: 0 K/UL (ref 0–0.1)
BASOPHILS NFR BLD: 1 % (ref 0–2)
BILIRUB SERPL-MCNC: 0.5 MG/DL (ref 0.2–1)
BILIRUB UR QL: NEGATIVE
BUN SERPL-MCNC: 11 MG/DL (ref 7–18)
BUN/CREAT SERPL: 12 (ref 12–20)
CALCIUM SERPL-MCNC: 9.5 MG/DL (ref 8.5–10.1)
CHLORIDE SERPL-SCNC: 108 MMOL/L (ref 100–111)
CO2 SERPL-SCNC: 28 MMOL/L (ref 21–32)
COLOR UR: ABNORMAL
CREAT SERPL-MCNC: 0.92 MG/DL (ref 0.6–1.3)
DIFFERENTIAL METHOD BLD: ABNORMAL
EOSINOPHIL # BLD: 0.1 K/UL (ref 0–0.4)
EOSINOPHIL NFR BLD: 2 % (ref 0–5)
EPITH CASTS URNS QL MICRO: NORMAL /LPF (ref 0–5)
ERYTHROCYTE [DISTWIDTH] IN BLOOD BY AUTOMATED COUNT: 14.2 % (ref 11.6–14.5)
GLOBULIN SER CALC-MCNC: 4.3 G/DL (ref 2–4)
GLUCOSE SERPL-MCNC: 114 MG/DL (ref 74–99)
GLUCOSE UR STRIP.AUTO-MCNC: NEGATIVE MG/DL
HCG UR QL: NEGATIVE
HCT VFR BLD AUTO: 36.9 % (ref 35–45)
HGB BLD-MCNC: 11.9 G/DL (ref 12–16)
HGB UR QL STRIP: ABNORMAL
KETONES UR QL STRIP.AUTO: ABNORMAL MG/DL
LEUKOCYTE ESTERASE UR QL STRIP.AUTO: ABNORMAL
LYMPHOCYTES # BLD: 2.6 K/UL (ref 0.9–3.6)
LYMPHOCYTES NFR BLD: 40 % (ref 21–52)
MCH RBC QN AUTO: 27.7 PG (ref 24–34)
MCHC RBC AUTO-ENTMCNC: 32.2 G/DL (ref 31–37)
MCV RBC AUTO: 85.8 FL (ref 74–97)
MONOCYTES # BLD: 0.5 K/UL (ref 0.05–1.2)
MONOCYTES NFR BLD: 8 % (ref 3–10)
NEUTS SEG # BLD: 3.2 K/UL (ref 1.8–8)
NEUTS SEG NFR BLD: 49 % (ref 40–73)
NITRITE UR QL STRIP.AUTO: NEGATIVE
PH UR STRIP: 5.5 [PH] (ref 5–8)
PLATELET # BLD AUTO: 356 K/UL (ref 135–420)
PMV BLD AUTO: 9.3 FL (ref 9.2–11.8)
POTASSIUM SERPL-SCNC: 4.1 MMOL/L (ref 3.5–5.5)
PROT SERPL-MCNC: 7.4 G/DL (ref 6.4–8.2)
PROT UR STRIP-MCNC: ABNORMAL MG/DL
RBC # BLD AUTO: 4.3 M/UL (ref 4.2–5.3)
RBC #/AREA URNS HPF: NORMAL /HPF (ref 0–5)
SODIUM SERPL-SCNC: 143 MMOL/L (ref 136–145)
SP GR UR REFRACTOMETRY: 1.03 (ref 1–1.03)
UROBILINOGEN UR QL STRIP.AUTO: 1 EU/DL (ref 0.2–1)
WBC # BLD AUTO: 6.4 K/UL (ref 4.6–13.2)
WBC URNS QL MICRO: NORMAL /HPF (ref 0–4)

## 2021-01-09 PROCEDURE — 96372 THER/PROPH/DIAG INJ SC/IM: CPT

## 2021-01-09 PROCEDURE — 74011250636 HC RX REV CODE- 250/636: Performed by: EMERGENCY MEDICINE

## 2021-01-09 PROCEDURE — 80053 COMPREHEN METABOLIC PANEL: CPT

## 2021-01-09 PROCEDURE — 85025 COMPLETE CBC W/AUTO DIFF WBC: CPT

## 2021-01-09 PROCEDURE — 81025 URINE PREGNANCY TEST: CPT

## 2021-01-09 PROCEDURE — 81001 URINALYSIS AUTO W/SCOPE: CPT

## 2021-01-09 PROCEDURE — 99283 EMERGENCY DEPT VISIT LOW MDM: CPT

## 2021-01-09 RX ORDER — KETOROLAC TROMETHAMINE 15 MG/ML
15 INJECTION, SOLUTION INTRAMUSCULAR; INTRAVENOUS ONCE
Status: DISCONTINUED | OUTPATIENT
Start: 2021-01-09 | End: 2021-01-09 | Stop reason: CLARIF

## 2021-01-09 RX ORDER — KETOROLAC TROMETHAMINE 15 MG/ML
15 INJECTION, SOLUTION INTRAMUSCULAR; INTRAVENOUS
Status: COMPLETED | OUTPATIENT
Start: 2021-01-09 | End: 2021-01-09

## 2021-01-09 RX ORDER — GALCANEZUMAB 120 MG/ML
120 INJECTION, SOLUTION SUBCUTANEOUS
COMMUNITY

## 2021-01-09 RX ADMIN — KETOROLAC TROMETHAMINE 15 MG: 15 INJECTION, SOLUTION INTRAMUSCULAR; INTRAVENOUS at 10:35

## 2021-01-09 NOTE — ED TRIAGE NOTES
Pt presents with vaginal bleeding and suprapubic pain. Pt reports having endometrial ablation on 12/1/2020 and has had bleeding since. Dr. Mandeep Cochran at [facility] for [facility] aware. Pt states bleeding worsened and pt is bleeding through 1 super tampon and an overnight pad every two hours with increasing suprapubic pain x last 4 days. Pt reports hx fibroids. As well as nausea and decreased appetite last 2 days.

## 2021-01-09 NOTE — LETTER
NOTIFICATION RETURN TO WORK / SCHOOL 
 
1/9/2021 10:52 AM 
 
Ms. Chidi Ames 
[address] [id number] To Whom It May Concern: 
 
Liberty Mckeon is currently under the care of [id number] [place] [address] EMERGENCY DEPT. She will return to work/school on: 1/11/21 If there are questions or concerns please have the patient contact our office. Sincerely, Jose oLza RN

## 2021-01-09 NOTE — ED PROVIDER NOTES
EMERGENCY DEPARTMENT HISTORY AND PHYSICAL EXAM    9:30 AM  Date: 2021  Patient Name: Brayden Rojas    History of Presenting Illness     Chief Complaint   Patient presents with    Suprapubic Pain        History Provided By: Patient    HPI: Brayden Rojas is a 50 y.o. female with history of multiple medical problems as below post endometrial ablation a month ago. Patient is presenting with persistent vaginal bleeding and pelvic cramping which is similar to her usual pain. No new symptoms today but she is feeling more fatigued. She changes her tampon approximately every 3 hours. No history of nausea or vomiting. No chest pain or shortness of breath. No dizziness. She was try to get a hold of her GYN but could not get a close appointment so she came to the ER. She is on OCP for the bleeding    Location:  Severity:  Timing/course: Onset/Duration:     PCP: Kajal Vasquez MD    Past History     Past Medical History:  Past Medical History:   Diagnosis Date    Diabetes ([address]     Pt reports resolved     Essential hypertension     Hiatal hernia     History of endometrial ablation 2020    Migraines     Neurological disorder     migraines    Neuropathy        Past Surgical History:  Past Surgical History:   Procedure Laterality Date    HX  SECTION      c section    HX CHOLECYSTECTOMY      HX GASTRIC BYPASS  2019    HX ORTHOPAEDIC  1996    pelvic repair       Family History:  Family History   Problem Relation Age of Onset    Hypertension Mother     Diabetes Mother     Hypertension Father     Diabetes Father        Social History:  Social History     Tobacco Use    Smoking status: Former Smoker     Quit date: 2016     Years since quittin.9    Smokeless tobacco: Never Used   Substance Use Topics    Alcohol use: No    Drug use: No       Allergies:   Allergies   Allergen Reactions    Aspirin Other (comments)     Chest pain    Darvocet A500 [Propoxyphene N-Acetaminophen] Hives    Pcn [Penicillins] Hives    Sulfur Hives    Sulfa (Sulfonamide Antibiotics) Hives       Review of Systems   Review of Systems   Genitourinary: Positive for pelvic pain and vaginal bleeding. All other systems reviewed and are negative. Physical Exam     Patient Vitals for the past 12 hrs:   Temp Pulse Resp BP SpO2   01/09/21 0906 98.2 °F (36.8 °C) (!) 110 16 (!) 158/98 100 %       Physical Exam  Vitals signs and nursing note reviewed. Constitutional:       Appearance: Normal appearance. HENT:      Head: Normocephalic and atraumatic. Eyes:      Extraocular Movements: Extraocular movements intact. Neck:      Musculoskeletal: Normal range of motion and neck supple. Cardiovascular:      Rate and Rhythm: Normal rate. Pulmonary:      Effort: Pulmonary effort is normal. No respiratory distress. Abdominal:      Palpations: Abdomen is soft. Tenderness: There is no abdominal tenderness. Musculoskeletal: Normal range of motion. Skin:     General: Skin is warm and dry. Neurological:      General: No focal deficit present. Mental Status: She is alert and oriented to person, place, and time. Psychiatric:         Mood and Affect: Mood normal.         Behavior: Behavior normal.         Diagnostic Study Results     Labs -  No results found for this or any previous visit (from the past 12 hour(s)). Radiologic Studies -   No results found. Medical Decision Making     ED Course: Progress Notes, Reevaluation, and Consults:    9:30 AM Initial assessment performed. The patients presenting problems have been discussed, and they/their family are in agreement with the care plan formulated and outlined with them. I have encouraged them to ask questions as they arise throughout their visit. Provider Notes (Medical Decision Making): 64-year-old female presenting with persistent vaginal bleeding and cramping after endometrial ablation.   Pain and bleeding are at baseline but the patient is more fatigued today. Well-appearing on exam and not in distress. Abdomen is soft and nontender. Will check her UA to rule out a UTI as well as CBC to rule out severe anemia that could be causing her fatigue. Patient was instructed to follow-up with her GYN. Procedures:     Critical Care Time:     Vital Signs-Reviewed the patient's vital signs. Reviewed pt's pulse ox reading. EKG: Interpreted by the EP. Time Interpreted:    Rate:    Rhythm:    Interpretation:   Comparison:     Records Reviewed: Nursing Notes (Time of Review: 9:30 AM)  -I am the first provider for this patient.  -I reviewed the vital signs, available nursing notes, past medical history, past surgical history, family history and social history. Current Outpatient Medications   Medication Sig Dispense Refill    galcanezumab-gnlm (Emgality Syringe) 120 mg/mL syrg 120 mg by SubCUTAneous route every month.  SUMAtriptan (IMITREX) 50 mg tablet Imitrex 50 mg tablet   Take 1 tablet as needed by oral route.  hydroxyzine HCL (ATARAX) 50 mg tablet hydroxyzine HCl 50 mg tablet      losartan (COZAAR) 25 mg tablet Take  by mouth daily.  pregabalin (LYRICA) 20 mg/mL soln Take  by mouth.  multivitamin with iron (FLINTSTONES) chewable tablet Take 1 Tab by mouth two (2) times a day.  Calcium Citrate-Vitamin D3 500 mg calcium -400 unit chew Take 1 Tab by mouth daily.  cholecalciferol, VITAMIN D3, (VITAMIN D3) 5,000 unit tab tablet Take 5,000 Units by mouth daily.  cyanocobalamin (VITAMIN B-12) 1,000 mcg tablet Take 1,000 mcg by mouth daily.  thiamine HCL (VITAMIN B-1) 100 mg tablet Take  by mouth daily.  hydrOXYzine pamoate (VISTARIL) 50 mg capsule Take 25 mg by mouth three (3) times daily as needed for Itching.  topiramate (TOPAMAX) 100 mg tablet Take  by mouth two (2) times a day. Clinical Impression     Clinical Impression: No diagnosis found.     Disposition: dc          This note was dictated utilizing voice recognition software which may lead to typographical errors. I apologize in advance if the situation occurs. If questions arise please do not hesitate to contact me or call our department.     Portia Deng MD  9:30 AM

## 2021-04-20 ENCOUNTER — HOSPITAL ENCOUNTER (OUTPATIENT)
Dept: LAB | Age: 46
Discharge: HOME | End: 2021-04-20

## 2021-04-20 LAB — SENTARA SPECIMEN COL,SENBCF: NORMAL

## 2021-04-20 PROCEDURE — 99001 SPECIMEN HANDLING PT-LAB: CPT

## 2021-07-07 ENCOUNTER — DOCUMENTATION ONLY (OUTPATIENT)
Dept: SURGERY | Age: 46
End: 2021-07-07

## 2021-07-07 NOTE — PROGRESS NOTES
Per [facility] requirements;  E-mail and letter sent for follow up appointment. [employer] [facility] [place] [facility] [address]      Dear Patient,    Your health is our main concern. It is important for your health to have follow-up lab work and to see your surgeon at 3 months, 6 months and annually after your weight loss surgery. Additionally, the Department of bariatric Surgery at our hospital is a member of the [address] Surgical Quality Improvement Program ([facility] NSQIP). As a participant in this program, we gather information on the outcomes of our patients after surgery. Please call the office for a follow up appointment at [phone] [facility]) or [phone] [facility]). If you have moved out of the area or have changed surgeons please call us and let us know the name of your doctor. Your health and feedback are important to us. We greatly appreciate your response.        Thank you,  [employer] [facility] [place] [facility] [address] [facility]

## 2021-08-03 PROBLEM — E66.01 OBESITY, MORBID (HCC): Status: RESOLVED | Noted: 2019-05-29 | Resolved: 2021-08-03

## 2021-09-06 ENCOUNTER — HOSPITAL ENCOUNTER (EMERGENCY)
Age: 46
Discharge: HOME | End: 2021-09-06
Attending: STUDENT IN AN ORGANIZED HEALTH CARE EDUCATION/TRAINING PROGRAM
Payer: COMMERCIAL

## 2021-09-06 ENCOUNTER — APPOINTMENT (OUTPATIENT)
Dept: GENERAL RADIOLOGY | Age: 46
End: 2021-09-06
Attending: STUDENT IN AN ORGANIZED HEALTH CARE EDUCATION/TRAINING PROGRAM
Payer: COMMERCIAL

## 2021-09-06 VITALS
HEIGHT: 69 IN | DIASTOLIC BLOOD PRESSURE: 84 MMHG | RESPIRATION RATE: 17 BRPM | WEIGHT: 247 LBS | BODY MASS INDEX: 36.58 KG/M2 | TEMPERATURE: 98.6 F | OXYGEN SATURATION: 100 % | HEART RATE: 101 BPM | SYSTOLIC BLOOD PRESSURE: 136 MMHG

## 2021-09-06 DIAGNOSIS — R07.9 CHEST PAIN, UNSPECIFIED TYPE: Primary | ICD-10-CM

## 2021-09-06 LAB
ANION GAP SERPL CALC-SCNC: 6 MMOL/L (ref 3–18)
ATRIAL RATE: 95 BPM
BASOPHILS # BLD: 0 K/UL (ref 0–0.1)
BASOPHILS NFR BLD: 1 % (ref 0–2)
BUN SERPL-MCNC: 15 MG/DL (ref 7–18)
BUN/CREAT SERPL: 18 (ref 12–20)
CALCIUM SERPL-MCNC: 9.9 MG/DL (ref 8.5–10.1)
CALCULATED P AXIS, ECG09: 63 DEGREES
CALCULATED R AXIS, ECG10: 52 DEGREES
CALCULATED T AXIS, ECG11: 46 DEGREES
CHLORIDE SERPL-SCNC: 105 MMOL/L (ref 100–111)
CK MB CFR SERPL CALC: NORMAL % (ref 0–4)
CK MB SERPL-MCNC: <1 NG/ML (ref 5–25)
CK SERPL-CCNC: 78 U/L (ref 26–192)
CO2 SERPL-SCNC: 26 MMOL/L (ref 21–32)
CREAT SERPL-MCNC: 0.85 MG/DL (ref 0.6–1.3)
D DIMER PPP FEU-MCNC: 0.36 UG/ML(FEU)
DIAGNOSIS, 93000: NORMAL
DIFFERENTIAL METHOD BLD: ABNORMAL
EOSINOPHIL # BLD: 0.1 K/UL (ref 0–0.4)
EOSINOPHIL NFR BLD: 1 % (ref 0–5)
ERYTHROCYTE [DISTWIDTH] IN BLOOD BY AUTOMATED COUNT: 13.1 % (ref 11.6–14.5)
GLUCOSE SERPL-MCNC: 157 MG/DL (ref 74–99)
HCT VFR BLD AUTO: 35.6 % (ref 35–45)
HGB BLD-MCNC: 11.7 G/DL (ref 12–16)
LYMPHOCYTES # BLD: 2.1 K/UL (ref 0.9–3.6)
LYMPHOCYTES NFR BLD: 39 % (ref 21–52)
MAGNESIUM SERPL-MCNC: 2.2 MG/DL (ref 1.6–2.6)
MCH RBC QN AUTO: 27.9 PG (ref 24–34)
MCHC RBC AUTO-ENTMCNC: 32.9 G/DL (ref 31–37)
MCV RBC AUTO: 85 FL (ref 78–100)
MONOCYTES # BLD: 0.3 K/UL (ref 0.05–1.2)
MONOCYTES NFR BLD: 5 % (ref 3–10)
NEUTS SEG # BLD: 2.9 K/UL (ref 1.8–8)
NEUTS SEG NFR BLD: 55 % (ref 40–73)
P-R INTERVAL, ECG05: 168 MS
PLATELET # BLD AUTO: 393 K/UL (ref 135–420)
PMV BLD AUTO: 9 FL (ref 9.2–11.8)
POTASSIUM SERPL-SCNC: 4.1 MMOL/L (ref 3.5–5.5)
Q-T INTERVAL, ECG07: 370 MS
QRS DURATION, ECG06: 80 MS
QTC CALCULATION (BEZET), ECG08: 464 MS
RBC # BLD AUTO: 4.19 M/UL (ref 4.2–5.3)
SODIUM SERPL-SCNC: 137 MMOL/L (ref 136–145)
TROPONIN I SERPL-MCNC: <0.02 NG/ML (ref 0–0.04)
VENTRICULAR RATE, ECG03: 95 BPM
WBC # BLD AUTO: 5.4 K/UL (ref 4.6–13.2)

## 2021-09-06 PROCEDURE — 85379 FIBRIN DEGRADATION QUANT: CPT

## 2021-09-06 PROCEDURE — 99284 EMERGENCY DEPT VISIT MOD MDM: CPT

## 2021-09-06 PROCEDURE — 83735 ASSAY OF MAGNESIUM: CPT

## 2021-09-06 PROCEDURE — 93005 ELECTROCARDIOGRAM TRACING: CPT

## 2021-09-06 PROCEDURE — 85025 COMPLETE CBC W/AUTO DIFF WBC: CPT

## 2021-09-06 PROCEDURE — 84484 ASSAY OF TROPONIN QUANT: CPT

## 2021-09-06 PROCEDURE — 80048 BASIC METABOLIC PNL TOTAL CA: CPT

## 2021-09-06 PROCEDURE — 71046 X-RAY EXAM CHEST 2 VIEWS: CPT

## 2021-09-06 NOTE — ED PROVIDER NOTES
EMERGENCY DEPARTMENT HISTORY AND PHYSICAL EXAM    I have evaluated the patient at 9:43 AM      Date: 9/6/2021  Patient Name: Zaki Ralph    History of Presenting Illness     No chief complaint on file. History Provided By: Patient  Location/Duration/Severity/Modifying factors   40-year-old female presenting to the emergency department for evaluation of chest pain. Started 1130 last night. Patient reports that initially started in her right anterior chest wall but has since moved to the left side of her chest and she can feel some radiation into her left scapula. She has not experienced this before. She denies any cardiac history denies any pulmonary history. Describes it as a pressure-like sensation. She notes no exacerbating or alleviating factors. Pain is persistent. Denies fevers or chills, shortness of breath, abdominal pain, NVD. Patient denies any recent surgery or long distance travel, however is currently on oral contraceptives. No unilateral leg swelling noted. PCP: Enzo Echeverria MD    Current Outpatient Medications   Medication Sig Dispense Refill    galcanezumab-[facility] Syringe) 120 mg/mL syrg 120 mg by SubCUTAneous route every month.  SUMAtriptan (IMITREX) 50 mg tablet Imitrex 50 mg tablet   Take 1 tablet as needed by oral route.  hydroxyzine HCL (ATARAX) 50 mg tablet hydroxyzine HCl 50 mg tablet      losartan (COZAAR) 25 mg tablet Take  by mouth daily.  pregabalin (LYRICA) 20 mg/mL soln Take  by mouth.  multivitamin with iron (FLINTSTONES) chewable tablet Take 1 Tab by mouth two (2) times a day.  Calcium Citrate-Vitamin D3 500 mg calcium -400 unit chew Take 1 Tab by mouth daily.  cholecalciferol, VITAMIN D3, (VITAMIN D3) 5,000 unit tab tablet Take 5,000 Units by mouth daily.  cyanocobalamin (VITAMIN B-12) 1,000 mcg tablet Take 1,000 mcg by mouth daily.  thiamine HCL (VITAMIN B-1) 100 mg tablet Take  by mouth daily.  hydrOXYzine pamoate (VISTARIL) 50 mg capsule Take 25 mg by mouth three (3) times daily as needed for Itching.  topiramate (TOPAMAX) 100 mg tablet Take  by mouth two (2) times a day. Past History     Past Medical History:  Past Medical History:   Diagnosis Date    Diabetes ([address]     Pt reports resolved     Essential hypertension     Hiatal hernia     History of endometrial ablation 2020    Migraines     Neurological disorder     migraines    Neuropathy        Past Surgical History:  Past Surgical History:   Procedure Laterality Date    HX  SECTION      c section    HX CHOLECYSTECTOMY      HX GASTRIC BYPASS  2019    HX ORTHOPAEDIC  1996    pelvic repair       Family History:  Family History   Problem Relation Age of Onset    Hypertension Mother     Diabetes Mother     Hypertension Father     Diabetes Father        Social History:  Social History     Tobacco Use    Smoking status: Former Smoker     Quit date: 2016     Years since quittin.6    Smokeless tobacco: Never Used   Substance Use Topics    Alcohol use: No    Drug use: No       Allergies: Allergies   Allergen Reactions    Aspirin Other (comments)     Chest pain    Darvocet A500 [Propoxyphene N-Acetaminophen] Hives    Pcn [Penicillins] Hives    Sulfur Hives    Sulfa (Sulfonamide Antibiotics) Hives         Review of Systems       Review of Systems   Constitutional: Negative for activity change, chills, diaphoresis, fatigue and fever. Eyes: Negative for photophobia, pain and visual disturbance. Respiratory: Negative for cough, chest tightness, shortness of breath, wheezing and stridor. Cardiovascular: Positive for chest pain. Negative for palpitations. Gastrointestinal: Negative for abdominal distention, abdominal pain, constipation, diarrhea, nausea and vomiting. Musculoskeletal: Negative for back pain, joint swelling and myalgias. Skin: Negative for rash and wound. Neurological: Negative for dizziness, weakness and headaches. Psychiatric/Behavioral: Negative for agitation. The patient is not nervous/anxious. Physical Exam     Visit Vitals  /84 (BP 1 Location: Left upper arm, BP Patient Position: At rest)   Pulse (!) 101   Temp 98.6 °F (37 °C)   Resp 17   Ht 5' 9\" (1.753 m)   Wt 112 kg (247 lb)   SpO2 100%   BMI 36.48 kg/m²         Physical Exam  Constitutional:       General: She is not in acute distress. Appearance: She is not toxic-appearing. HENT:      Head: Normocephalic and atraumatic. Cardiovascular:      Rate and Rhythm: Normal rate and regular rhythm. Heart sounds: Normal heart sounds. No murmur heard. No friction rub. No gallop. Pulmonary:      Effort: Pulmonary effort is normal.      Breath sounds: Normal breath sounds. Abdominal:      General: There is no distension. Palpations: Abdomen is soft. There is no mass. Tenderness: There is no abdominal tenderness. There is no guarding. Hernia: No hernia is present. Musculoskeletal:         General: No swelling, tenderness or deformity. Cervical back: Normal range of motion and neck supple. Skin:     General: Skin is warm and dry. Findings: No rash. Neurological:      General: No focal deficit present. Mental Status: She is alert and oriented to person, place, and time.    Psychiatric:         Mood and Affect: Mood normal.           Diagnostic Study Results     Labs -  Recent Results (from the past 12 hour(s))   EKG, 12 LEAD, INITIAL    Collection Time: 09/06/21  9:29 AM   Result Value Ref Range    Ventricular Rate 95 BPM    Atrial Rate 95 BPM    P-R Interval 168 ms    QRS Duration 80 ms    Q-T Interval 370 ms    QTC Calculation (Bezet) 464 ms    Calculated P Axis 63 degrees    Calculated R Axis 52 degrees    Calculated T Axis 46 degrees    Diagnosis       Normal sinus rhythm  Normal ECG  When compared with ECG of 28-FEB-2019 17:49,  No significant change was found  Confirmed by Melany Rangel ([id number]) on 9/6/2021 11:07:04 AM     CBC WITH AUTOMATED DIFF    Collection Time: 09/06/21  9:45 AM   Result Value Ref Range    WBC 5.4 4.6 - 13.2 K/uL    RBC 4.19 (L) 4.20 - 5.30 M/uL    HGB 11.7 (L) 12.0 - 16.0 g/dL    HCT 35.6 35.0 - 45.0 %    MCV 85.0 78.0 - 100.0 FL    MCH 27.9 24.0 - 34.0 PG    MCHC 32.9 31.0 - 37.0 g/dL    RDW 13.1 11.6 - 14.5 %    PLATELET 509 241 - 801 K/uL    MPV 9.0 (L) 9.2 - 11.8 FL    NEUTROPHILS 55 40 - 73 %    LYMPHOCYTES 39 21 - 52 %    MONOCYTES 5 3 - 10 %    EOSINOPHILS 1 0 - 5 %    BASOPHILS 1 0 - 2 %    ABS. NEUTROPHILS 2.9 1.8 - 8.0 K/UL    ABS. LYMPHOCYTES 2.1 0.9 - 3.6 K/UL    ABS. MONOCYTES 0.3 0.05 - 1.2 K/UL    ABS. EOSINOPHILS 0.1 0.0 - 0.4 K/UL    ABS. BASOPHILS 0.0 0.0 - 0.1 K/UL    DF AUTOMATED     METABOLIC PANEL, BASIC    Collection Time: 09/06/21  9:45 AM   Result Value Ref Range    Sodium 137 136 - 145 mmol/L    Potassium 4.1 3.5 - 5.5 mmol/L    Chloride 105 100 - 111 mmol/L    CO2 26 21 - 32 mmol/L    Anion gap 6 3.0 - 18 mmol/L    Glucose 157 (H) 74 - 99 mg/dL    BUN 15 7.0 - 18 MG/DL    Creatinine 0.85 0.6 - 1.3 MG/DL    BUN/Creatinine ratio 18 12 - 20      GFR est AA >60 >60 ml/min/1.73m2    GFR est non-AA >60 >60 ml/min/1.73m2    Calcium 9.9 8.5 - 10.1 MG/DL   MAGNESIUM    Collection Time: 09/06/21  9:45 AM   Result Value Ref Range    Magnesium 2.2 1.6 - 2.6 mg/dL   CARDIAC PANEL,(CK, CKMB & TROPONIN)    Collection Time: 09/06/21  9:45 AM   Result Value Ref Range    CK - MB <1.0 <3.6 ng/ml    CK-MB Index  0.0 - 4.0 %     CALCULATION NOT PERFORMED WHEN RESULT IS BELOW LINEAR LIMIT    CK 78 26 - 192 U/L    Troponin-I, QT <0.02 0.0 - 0.045 NG/ML   D DIMER    Collection Time: 09/06/21 12:00 PM   Result Value Ref Range    D DIMER 0.36 <0.46 ug/ml(FEU)       Radiologic Studies -   XR CHEST PA LAT   Final Result   1. No radiographic evidence of acute cardiopulmonary disease.       Thank you for enabling us to participate in the care of this patient. Medical Decision Making   I am the first provider for this patient. I reviewed the vital signs, available nursing notes, past medical history, past surgical history, family history and social history. Vital Signs-Reviewed the patient's vital signs. EKG: Normal sinus rhythm without any evidence of ST elevation or depression. Records Reviewed: Nursing Notes and Old Medical Records (Time of Review: 9:43 AM)    ED Course: Progress Notes, Reevaluation, and Consults:         Provider Notes (Medical Decision Making):   MDM  Number of Diagnoses or Management Options  Chest pain, unspecified type  Diagnosis management comments: 57-year-old female presenting to the emergency department for evaluation of chest pain since last night. She appears no acute distress. She is borderline tachycardic with a heart rate of 101. Vital signs otherwise stable. Afebrile. She has unlabored breathing and saturating 1% on room air. Heart sounds are regular and lungs are clear. No reproducible tenderness to palpation of the chest wall. Will obtain to rule out ACS. Given that the patient is on oral contraceptives will obtain D-dimer to rule out possible PE.       1230:  Patient's blood work is noncontributory to her symptomatology today. Troponin is negative. Chest x-ray is negative. D-dimer is negative. Likely patient is experiencing musculoskeletal pain. Patient has been reassured. Conservative measures at home and follow-up with primary care doctor. Instructed on ED return precautions. Patient verbalizes good understanding and agreement with the discharge plan. Diagnosis     Clinical Impression:   1.  Chest pain, unspecified type        Disposition: brain    Follow-up Information     Follow up With Specialties Details [facility] [place] [id number] EMERGENCY DEPT Emergency Medicine  As needed, If symptoms worsen [facility]  [phone] Chuckie Osgood., MD Pediatric Medicine Call   [phone]  [phone]             Patient's Medications   Start Taking    No medications on file   Continue Taking    CALCIUM CITRATE-VITAMIN D3 500 MG CALCIUM -400 UNIT CHEW    Take 1 Tab by mouth daily. CHOLECALCIFEROL, VITAMIN D3, (VITAMIN D3) 5,000 UNIT TAB TABLET    Take 5,000 Units by mouth daily. CYANOCOBALAMIN (VITAMIN B-12) 1,000 MCG TABLET    Take 1,000 mcg by mouth daily. GALCANEZUMAB-GNLM (EMGALITY SYRINGE) 120 MG/ML SYRG    120 mg by SubCUTAneous route every month. HYDROXYZINE HCL (ATARAX) 50 MG TABLET    hydroxyzine HCl 50 mg tablet    HYDROXYZINE PAMOATE (VISTARIL) 50 MG CAPSULE    Take 25 mg by mouth three (3) times daily as needed for Itching. LOSARTAN (COZAAR) 25 MG TABLET    Take  by mouth daily. MULTIVITAMIN WITH IRON (FLINTSTONES) CHEWABLE TABLET    Take 1 Tab by mouth two (2) times a day. PREGABALIN (LYRICA) 20 MG/ML SOLN    Take  by mouth. SUMATRIPTAN (IMITREX) 50 MG TABLET    Imitrex 50 mg tablet   Take 1 tablet as needed by oral route. THIAMINE HCL (VITAMIN B-1) 100 MG TABLET    Take  by mouth daily. TOPIRAMATE (TOPAMAX) 100 MG TABLET    Take  by mouth two (2) times a day. These Medications have changed    No medications on file   Stop Taking    No medications on file     Disclaimer: Sections of this note are dictated using utilizing voice recognition software. Minor typographical errors may be present. If questions arise, please do not hesitate to contact me or call our department.

## 2021-09-06 NOTE — ED TRIAGE NOTES
Pt c/o chest pain since 11 pm and also a migraine    Patient was seen in march for the same complaint.

## 2021-09-06 NOTE — LETTER
NOTIFICATION RETURN TO WORK / SCHOOL    9/6/2021 12:48 PM    Ms. [address]      To Whom It May Concern:    Matt Wilburn is currently under the care of [address] EMERGENCY DEPT. She will return to work/school on 9/7/2021. If there are questions or concerns please have the patient contact our office.         Sincerely,      Lyubov Chavez RN

## 2021-09-06 NOTE — DISCHARGE INSTRUCTIONS
Continue with Tylenol at home. Follow-up with your primary care doctor. Return to the emergency department for any new or worsening symptoms.

## 2021-09-14 ENCOUNTER — HOSPITAL ENCOUNTER (OUTPATIENT)
Dept: LAB | Age: 46
Discharge: HOME | End: 2021-09-14

## 2021-09-14 LAB — SENTARA SPECIMEN COL,SENBCF: NORMAL

## 2021-09-14 PROCEDURE — 99001 SPECIMEN HANDLING PT-LAB: CPT

## 2021-10-30 ENCOUNTER — HOSPITAL ENCOUNTER (EMERGENCY)
Age: 46
Discharge: HOME | End: 2021-10-30
Attending: STUDENT IN AN ORGANIZED HEALTH CARE EDUCATION/TRAINING PROGRAM
Payer: COMMERCIAL

## 2021-10-30 VITALS
OXYGEN SATURATION: 100 % | TEMPERATURE: 98.3 F | WEIGHT: 249 LBS | HEART RATE: 86 BPM | DIASTOLIC BLOOD PRESSURE: 84 MMHG | SYSTOLIC BLOOD PRESSURE: 125 MMHG | RESPIRATION RATE: 21 BRPM | BODY MASS INDEX: 36.88 KG/M2 | HEIGHT: 69 IN

## 2021-10-30 DIAGNOSIS — R11.2 NON-INTRACTABLE VOMITING WITH NAUSEA, UNSPECIFIED VOMITING TYPE: ICD-10-CM

## 2021-10-30 DIAGNOSIS — G43.009 MIGRAINE WITHOUT AURA AND WITHOUT STATUS MIGRAINOSUS, NOT INTRACTABLE: Primary | ICD-10-CM

## 2021-10-30 PROCEDURE — 96375 TX/PRO/DX INJ NEW DRUG ADDON: CPT

## 2021-10-30 PROCEDURE — 74011250636 HC RX REV CODE- 250/636: Performed by: STUDENT IN AN ORGANIZED HEALTH CARE EDUCATION/TRAINING PROGRAM

## 2021-10-30 PROCEDURE — 74011250637 HC RX REV CODE- 250/637: Performed by: STUDENT IN AN ORGANIZED HEALTH CARE EDUCATION/TRAINING PROGRAM

## 2021-10-30 PROCEDURE — 96374 THER/PROPH/DIAG INJ IV PUSH: CPT

## 2021-10-30 PROCEDURE — 99284 EMERGENCY DEPT VISIT MOD MDM: CPT

## 2021-10-30 RX ORDER — LOSARTAN POTASSIUM 50 MG/1
50 TABLET ORAL DAILY
COMMUNITY

## 2021-10-30 RX ORDER — LORAZEPAM 0.5 MG/1
0.5 TABLET ORAL
COMMUNITY
Start: 2021-10-29

## 2021-10-30 RX ORDER — KETOROLAC TROMETHAMINE 15 MG/ML
15 INJECTION, SOLUTION INTRAMUSCULAR; INTRAVENOUS ONCE
Status: COMPLETED | OUTPATIENT
Start: 2021-10-30 | End: 2021-10-30

## 2021-10-30 RX ORDER — LAMOTRIGINE 25 MG/1
50 TABLET ORAL 2 TIMES DAILY
COMMUNITY
Start: 2021-10-21

## 2021-10-30 RX ORDER — DIPHENHYDRAMINE HCL 25 MG
25 CAPSULE ORAL
Status: COMPLETED | OUTPATIENT
Start: 2021-10-30 | End: 2021-10-30

## 2021-10-30 RX ORDER — ONDANSETRON 2 MG/ML
4 INJECTION INTRAMUSCULAR; INTRAVENOUS ONCE
Status: COMPLETED | OUTPATIENT
Start: 2021-10-30 | End: 2021-10-30

## 2021-10-30 RX ORDER — FLUVOXAMINE MALEATE 50 MG/1
50 TABLET ORAL 2 TIMES DAILY
COMMUNITY
Start: 2021-10-10

## 2021-10-30 RX ORDER — DEXTROAMPHETAMINE SULFATE, DEXTROAMPHETAMINE SACCHARATE, AMPHETAMINE SULFATE AND AMPHETAMINE ASPARTATE 5; 5; 5; 5 MG/1; MG/1; MG/1; MG/1
20 CAPSULE, EXTENDED RELEASE ORAL DAILY
COMMUNITY
Start: 2021-10-22

## 2021-10-30 RX ORDER — MEDROXYPROGESTERONE ACETATE 10 MG/1
10 TABLET ORAL AS NEEDED
COMMUNITY
Start: 2021-10-17

## 2021-10-30 RX ORDER — FUROSEMIDE 20 MG/1
20 TABLET ORAL DAILY
COMMUNITY
Start: 2021-08-18

## 2021-10-30 RX ORDER — RISPERIDONE 0.5 MG/1
0.5 TABLET, FILM COATED ORAL 2 TIMES DAILY
COMMUNITY
Start: 2021-10-19

## 2021-10-30 RX ORDER — CYCLOBENZAPRINE HCL 10 MG
10 TABLET ORAL
COMMUNITY
Start: 2021-10-14

## 2021-10-30 RX ORDER — NORETHINDRONE ACETATE AND ETHINYL ESTRADIOL 1MG-20(21)
KIT ORAL
COMMUNITY

## 2021-10-30 RX ORDER — RIZATRIPTAN BENZOATE 10 MG/1
10 TABLET ORAL AS NEEDED
COMMUNITY

## 2021-10-30 RX ORDER — PROCHLORPERAZINE EDISYLATE 5 MG/ML
10 INJECTION INTRAMUSCULAR; INTRAVENOUS ONCE
Status: COMPLETED | OUTPATIENT
Start: 2021-10-30 | End: 2021-10-30

## 2021-10-30 RX ADMIN — DIPHENHYDRAMINE HYDROCHLORIDE 25 MG: 25 CAPSULE ORAL at 15:23

## 2021-10-30 RX ADMIN — KETOROLAC TROMETHAMINE 15 MG: 15 INJECTION, SOLUTION INTRAMUSCULAR; INTRAVENOUS at 15:02

## 2021-10-30 RX ADMIN — PROCHLORPERAZINE EDISYLATE 10 MG: 5 INJECTION INTRAMUSCULAR; INTRAVENOUS at 15:23

## 2021-10-30 RX ADMIN — SODIUM CHLORIDE 1000 ML: 900 INJECTION, SOLUTION INTRAVENOUS at 15:16

## 2021-10-30 RX ADMIN — ONDANSETRON 4 MG: 2 INJECTION INTRAMUSCULAR; INTRAVENOUS at 15:01

## 2021-10-30 NOTE — ED PROVIDER NOTES
EMERGENCY DEPARTMENT HISTORY AND PHYSICAL EXAM      Date: 10/30/2021  Patient Name: Twylla Nissen    History of Presenting Illness     Chief Complaint   Patient presents with    Headache    Vomiting       History (Context): Twylla Nissen is a 48 y.o. female with a past medical history significant for diabetes, migraine comes into the ED today due to headache, nausea, and vomiting. Patient states symptoms began yesterday and she has had multiple episodes of vomiting since an. Patient states symptoms have worsened since onset. She describes her headache as dull and achy which is located to the posterior aspect of her head and radiates around. She states symptoms are not completely consistent with previous migraine headaches. She states that she is unable to tolerate any p.o. intake due to her nausea and vomiting. She did not take any medication at home for treatment of her symptoms prior to arrival.  Patient describes the symptoms as severe. She denies any alleviating or exacerbating factors. PCP: Tom Poon MD    Current Outpatient Medications   Medication Sig Dispense Refill    norethindrone-ethinyl estradiol (Blisovi Fe 1/20, 28,) 1 mg-20 mcg (21)/75 mg (7) tab Blisovi Fe 1/20 (28) 1 mg-20 mcg (21)/75 mg (7) tablet      losartan (COZAAR) 50 mg tablet Take 50 mg by mouth daily.  Adderall XR 20 mg XR capsule Take 20 mg by mouth daily.  cyclobenzaprine (FLEXERIL) 10 mg tablet Take 10 mg by mouth three (3) times daily as needed.  fluvoxaMINE (LUVOX) 50 mg tablet Take 50 mg by mouth two (2) times a day.  furosemide (LASIX) 20 mg tablet Take 20 mg by mouth daily.  lamoTRIgine (LaMICtal) 25 mg tablet Take 50 mg by mouth two (2) times a day.  medroxyPROGESTERone (PROVERA) 10 mg tablet Take 10 mg by mouth as needed.  LORazepam (ATIVAN) 0.5 mg tablet Take 0.5 mg by mouth two (2) times daily as needed.       rizatriptan (MAXALT) 10 mg tablet Take 10 mg by mouth as needed.  risperiDONE (RisperDAL) 0.5 mg tablet Take 0.5 mg by mouth two (2) times a day.  galcanezumab-gnlm (Emgality Syringe) 120 mg/mL syrg 120 mg by SubCUTAneous route every month.  hydroxyzine HCL (ATARAX) 50 mg tablet hydroxyzine HCl 50 mg tablet      multivitamin with iron (FLINTSTONES) chewable tablet Take 1 Tab by mouth two (2) times a day.  Calcium Citrate-Vitamin D3 500 mg calcium -400 unit chew Take 1 Tab by mouth daily.  cholecalciferol, VITAMIN D3, (VITAMIN D3) 5,000 unit tab tablet Take 5,000 Units by mouth daily.  cyanocobalamin (VITAMIN B-12) 1,000 mcg tablet Take 1,000 mcg by mouth daily.  thiamine HCL (VITAMIN B-1) 100 mg tablet Take  by mouth daily. Past History     Past Medical History:   Past Medical History:   Diagnosis Date    Diabetes ([facility] [address]     Pt reports resolved     Essential hypertension     Hiatal hernia     History of endometrial ablation 2020    Migraines     Neurological disorder     migraines    Neuropathy        Past Surgical History:  Past Surgical History:   Procedure Laterality Date    HX  SECTION      c section    HX CHOLECYSTECTOMY      HX GASTRIC BYPASS  2019    HX ORTHOPAEDIC  1996    pelvic repair       Family History:  Family History   Problem Relation Age of Onset    Hypertension Mother     Diabetes Mother     Hypertension Father     Diabetes Father        Social History:   Social History     Tobacco Use    Smoking status: Former Smoker     Quit date: 2016     Years since quittin.7    Smokeless tobacco: Never Used   Substance Use Topics    Alcohol use: No    Drug use: No       Allergies:   Allergies   Allergen Reactions    Aspirin Other (comments)     Chest pain    Darvocet A500 [Propoxyphene N-Acetaminophen] Hives    Pcn [Penicillins] Hives    Sulfur Hives    Sulfa (Sulfonamide Antibiotics) Hives       PMH, PSH, family history, social history, allergies reviewed with the patient with significant items noted above. Review of Systems   Review of Systems   Constitutional: Negative for chills and fever. HENT: Negative for sore throat. Eyes: Negative for visual disturbance. Respiratory: Negative for shortness of breath. Cardiovascular: Negative for chest pain. Gastrointestinal: Positive for nausea and vomiting. Negative for abdominal pain. Genitourinary: Negative for difficulty urinating. Musculoskeletal: Negative for myalgias. Skin: Negative for rash. Neurological: Positive for headaches. Physical Exam     Vitals:    10/30/21 1422 10/30/21 1445 10/30/21 1500 10/30/21 1515   BP: 135/82 126/71 (!) 142/78 122/76   Pulse: 90 91 87 82   Resp: 16 17 17 20   Temp: 98.3 °F (36.8 °C)      SpO2: 100% 100% 98% 100%   Weight: 112.9 kg (249 lb)      Height: 5' 9\" (1.753 m)          Physical Exam  Vitals and nursing note reviewed. Constitutional:       General: She is not in acute distress. Appearance: She is not ill-appearing or toxic-appearing. Comments: Fatigued     HENT:      Head: Normocephalic and atraumatic. Mouth/Throat:      Mouth: Mucous membranes are moist.   Eyes:      General: No scleral icterus. Conjunctiva/sclera: Conjunctivae normal.   Cardiovascular:      Rate and Rhythm: Normal rate and regular rhythm. Comments: Normal peripheral perfusion  Pulmonary:      Effort: Pulmonary effort is normal. No respiratory distress. Abdominal:      General: There is no distension. Palpations: Abdomen is soft. Tenderness: There is no abdominal tenderness. Musculoskeletal:         General: No deformity. Normal range of motion. Cervical back: Normal range of motion and neck supple. Skin:     General: Skin is warm and dry. Findings: No rash. Neurological:      General: No focal deficit present. Mental Status: She is alert and oriented to person, place, and time. Mental status is at baseline.       Sensory: No sensory deficit. Motor: No weakness. Gait: Gait normal.   Psychiatric:         Mood and Affect: Mood normal.         Thought Content: Thought content normal.         Diagnostic Study Results     Labs -   No results found for this or any previous visit (from the past 12 hour(s)). Labs Reviewed - No data to display    Radiologic Studies -   No orders to display     CT Results  (Last 48 hours)    None        CXR Results  (Last 48 hours)    None          The laboratory results, imaging results, and other diagnostic exams were reviewed in the EMR. Medical Decision Making   I am the first provider for this patient. I reviewed the vital signs, available nursing notes, past medical history, past surgical history, family history and social history. Vital Signs-Reviewed the patient's vital signs. Records Reviewed: Personally, on initial evaluation    MDM:   Laina Willis presents with complaint of nausea, vomiting, and headache  DDX includes but is not limited to: Migraine headache, vomiting, tension type headache    Patient overall fatigued in appearance but in no acute distress,Nontoxic in appearance, and vital signs grossly within normal limits. Provide patient with treatment for her headache and nausea at this time. Evaluate patient following for further disposition. Will continue to monitor and evaluate patient while in the ED.       Orders as below:  Orders Placed This Encounter    norethindrone-ethinyl estradiol (Blisovi Fe 1/20, 28,) 1 mg-20 mcg (21)/75 mg (7) tab    losartan (COZAAR) 50 mg tablet    Adderall XR 20 mg XR capsule    cyclobenzaprine (FLEXERIL) 10 mg tablet    fluvoxaMINE (LUVOX) 50 mg tablet    furosemide (LASIX) 20 mg tablet    lamoTRIgine (LaMICtal) 25 mg tablet    medroxyPROGESTERone (PROVERA) 10 mg tablet    LORazepam (ATIVAN) 0.5 mg tablet    rizatriptan (MAXALT) 10 mg tablet    risperiDONE (RisperDAL) 0.5 mg tablet    ondansetron (ZOFRAN) injection 4 mg    ketorolac (TORADOL) injection 15 mg    sodium chloride 0.9 % bolus infusion 1,000 mL    prochlorperazine (COMPAZINE) injection 10 mg    diphenhydrAMINE (BENADRYL) capsule 25 mg        ED Course:   ED Course as of Oct 30 2003   Sat Oct 30, 2021   1721 Stating symptoms have resolved. Will discharge patient home with return precautions and follow-up recommendations. Patient verbalized understanding and without any further questions. [DV]      ED Course User Index  [DV] Krystian Flores DO           Procedures:  Procedures        Diagnosis and Disposition     CLINICAL IMPRESSION:  No diagnosis found. Current Discharge Medication List          Disposition: Home    Patient condition at time of disposition: Stable    DISCHARGE NOTE:   Pt has been reexamined. Patient has no new complaints, changes, or physical findings. Care plan outlined and precautions discussed. Results were reviewed with the patient. All medications were reviewed with the patient. All of pt's questions and concerns were addressed. Alarm symptoms and return precautions associated with chief complaint and evaluation were reviewed with the patient in detail. The patient demonstrated adequate understanding. Patient was instructed to follow up with PCP, as well as strict return precautions to the ED upon further deterioration. Patient is ready to go home. The patient is happy with this plan        Dragon Disclaimer     Please note that this dictation was completed with [employer], the computer voice recognition software. Quite often unanticipated grammatical, syntax, homophones, and other interpretive errors are inadvertently transcribed by the computer software. Please disregard these errors. Please excuse any errors that have escaped final proofreading. Oneil RAM

## 2021-10-30 NOTE — ED NOTES
I have introduced myself to the patient and discussed anticipated plan of care. Patient resting quietly on stretcher in low and locked position. Call bell in reach. Side rail up x1. Patient rates pain as 5/10, much improved, with an equally large improvement of nausea. Patient states she has not vomited since medication.

## 2021-10-30 NOTE — ED TRIAGE NOTES
Patient states hx of migraines for which she takes Emgality. She states experiencing migraine headache since last night. C/o vomiting since 2300 yesterday. Advises of inability to tolerate PO intake.

## 2022-01-05 ENCOUNTER — HOSPITAL ENCOUNTER (OUTPATIENT)
Dept: LAB | Age: 47
Discharge: HOME | End: 2022-01-05

## 2022-01-05 LAB — SENTARA SPECIMEN COL,SENBCF: NORMAL

## 2022-01-05 PROCEDURE — 99001 SPECIMEN HANDLING PT-LAB: CPT

## 2022-02-17 ENCOUNTER — HOSPITAL ENCOUNTER (OUTPATIENT)
Dept: LAB | Age: 47
Discharge: HOME | End: 2022-02-17

## 2022-02-17 LAB — SENTARA SPECIMEN COL,SENBCF: NORMAL

## 2022-02-17 PROCEDURE — 99001 SPECIMEN HANDLING PT-LAB: CPT

## 2022-03-18 PROBLEM — M79.2 NEUROPATHIC PAIN: Status: ACTIVE | Noted: 2017-03-23

## 2022-03-18 PROBLEM — M25.50 POLYARTHRALGIA: Status: ACTIVE | Noted: 2017-03-23

## 2022-03-18 PROBLEM — M15.9 GENERALIZED OA: Status: ACTIVE | Noted: 2017-03-23

## 2022-03-19 PROBLEM — R10.32 LEFT LOWER QUADRANT PAIN: Status: ACTIVE | Noted: 2017-07-14

## 2022-03-19 PROBLEM — K42.9 PERIUMBILICAL HERNIA: Status: ACTIVE | Noted: 2019-05-29

## 2022-03-19 PROBLEM — K76.0 HEPATIC STEATOSIS: Status: ACTIVE | Noted: 2019-05-29

## 2022-03-19 PROBLEM — E66.01 MORBID OBESITY (HCC): Status: ACTIVE | Noted: 2019-08-27

## 2022-03-19 PROBLEM — F51.04 CHRONIC INSOMNIA: Status: ACTIVE | Noted: 2017-03-23

## 2022-03-19 PROBLEM — G89.21 CHRONIC PAIN DUE TO TRAUMA: Status: ACTIVE | Noted: 2017-03-23

## 2022-03-19 PROBLEM — M54.81 CERVICO-OCCIPITAL NEURALGIA: Status: ACTIVE | Noted: 2017-03-23

## 2022-03-20 PROBLEM — E66.9 DIABETES MELLITUS TYPE 2 IN OBESE (HCC): Status: ACTIVE | Noted: 2017-03-23

## 2022-03-20 PROBLEM — K91.2 POSTOPERATIVE MALABSORPTION: Status: ACTIVE | Noted: 2019-09-12

## 2022-03-20 PROBLEM — M79.7 FIBROMYALGIA: Status: ACTIVE | Noted: 2017-05-16

## 2022-03-20 PROBLEM — Z79.899 ENCOUNTER FOR LONG-TERM (CURRENT) USE OF MEDICATIONS: Status: ACTIVE | Noted: 2017-03-23

## 2022-03-20 PROBLEM — E11.69 DIABETES MELLITUS TYPE 2 IN OBESE (HCC): Status: ACTIVE | Noted: 2017-03-23

## 2022-08-17 ENCOUNTER — TRANSCRIBE ORDER (OUTPATIENT)
Dept: SCHEDULING | Age: 47
End: 2022-08-17

## 2022-08-17 DIAGNOSIS — I63.319 CEREBRAL INFARCTION DUE TO THROMBOSIS OF MIDDLE CEREBRAL ARTERY (HCC): Primary | ICD-10-CM

## 2022-08-18 ENCOUNTER — HOSPITAL ENCOUNTER (OUTPATIENT)
Dept: LAB | Age: 47
Discharge: HOME | End: 2022-08-18

## 2022-08-18 LAB — SENTARA SPECIMEN COL,SENBCF: NORMAL

## 2022-08-18 PROCEDURE — 99001 SPECIMEN HANDLING PT-LAB: CPT

## 2022-08-30 ENCOUNTER — HOSPITAL ENCOUNTER (OUTPATIENT)
Age: 47
Discharge: HOME | End: 2022-08-30
Attending: NURSE PRACTITIONER
Payer: COMMERCIAL

## 2022-08-30 DIAGNOSIS — I63.319 CEREBRAL INFARCTION DUE TO THROMBOSIS OF MIDDLE CEREBRAL ARTERY (HCC): ICD-10-CM

## 2022-08-30 PROCEDURE — 70551 MRI BRAIN STEM W/O DYE: CPT

## 2022-10-15 ENCOUNTER — TRANSCRIBE ORDER (OUTPATIENT)
Dept: SCHEDULING | Age: 47
End: 2022-10-15

## 2022-10-15 DIAGNOSIS — H90.3 SENSORINEURAL HEARING LOSS, BILATERAL: ICD-10-CM

## 2022-10-15 DIAGNOSIS — H90.3 SENSORY HEARING LOSS, BILATERAL: Primary | ICD-10-CM

## 2022-10-15 DIAGNOSIS — H93.25 CENTRAL AUDITORY PROCESSING DISORDER: ICD-10-CM

## 2022-10-15 DIAGNOSIS — R42 DIZZINESS AND GIDDINESS: ICD-10-CM

## 2022-11-14 ENCOUNTER — HOSPITAL ENCOUNTER (OUTPATIENT)
Dept: CT IMAGING | Age: 47
Discharge: HOME | End: 2022-11-14
Attending: OTOLARYNGOLOGY
Payer: COMMERCIAL

## 2022-11-14 DIAGNOSIS — H90.3 SENSORINEURAL HEARING LOSS, BILATERAL: ICD-10-CM

## 2022-11-14 DIAGNOSIS — R42 DIZZINESS AND GIDDINESS: ICD-10-CM

## 2022-11-14 DIAGNOSIS — H93.25 CENTRAL AUDITORY PROCESSING DISORDER: ICD-10-CM

## 2022-11-14 PROCEDURE — 70480 CT ORBIT/EAR/FOSSA W/O DYE: CPT
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
## 2023-01-19 ENCOUNTER — OFFICE VISIT (OUTPATIENT)
Dept: ORTHOPEDIC SURGERY | Age: 48
End: 2023-01-19
Payer: COMMERCIAL

## 2023-01-19 VITALS — WEIGHT: 226.2 LBS | BODY MASS INDEX: 33.4 KG/M2 | TEMPERATURE: 97.5 F

## 2023-01-19 DIAGNOSIS — M25.562 CHRONIC PAIN OF LEFT KNEE: ICD-10-CM

## 2023-01-19 DIAGNOSIS — M25.561 CHRONIC PAIN OF RIGHT KNEE: ICD-10-CM

## 2023-01-19 DIAGNOSIS — G89.29 CHRONIC PAIN OF LEFT KNEE: ICD-10-CM

## 2023-01-19 DIAGNOSIS — M22.42 CHONDROMALACIA PATELLAE, LEFT: ICD-10-CM

## 2023-01-19 DIAGNOSIS — G89.29 CHRONIC PAIN OF RIGHT KNEE: ICD-10-CM

## 2023-01-19 DIAGNOSIS — M22.41 CHONDROMALACIA PATELLAE, RIGHT: Primary | ICD-10-CM

## 2023-01-19 RX ORDER — BETAMETHASONE SODIUM PHOSPHATE AND BETAMETHASONE ACETATE 3; 3 MG/ML; MG/ML
6 INJECTION, SUSPENSION INTRA-ARTICULAR; INTRALESIONAL; INTRAMUSCULAR; SOFT TISSUE ONCE
Status: COMPLETED | OUTPATIENT
Start: 2023-01-19 | End: 2023-01-19

## 2023-01-19 RX ADMIN — BETAMETHASONE SODIUM PHOSPHATE AND BETAMETHASONE ACETATE 6 MG: 3; 3 INJECTION, SUSPENSION INTRA-ARTICULAR; INTRALESIONAL; INTRAMUSCULAR; SOFT TISSUE at 14:56

## 2023-03-20 ENCOUNTER — HOSPITAL ENCOUNTER (OUTPATIENT)
Facility: HOSPITAL | Age: 48
Discharge: HOME | End: 2023-03-23
Payer: COMMERCIAL

## 2023-03-20 DIAGNOSIS — Z12.31 ENCOUNTER FOR SCREENING MAMMOGRAM FOR MALIGNANT NEOPLASM OF BREAST: ICD-10-CM

## 2023-03-20 PROCEDURE — 77063 BREAST TOMOSYNTHESIS BI: CPT

## (undated) DEVICE — STAPLER SKIN L440MM 32MM LNG 12 FIRING B FRM PWR + GRIPPING

## (undated) DEVICE — GLOVE SURG SZ 7.5 L11.73IN FNGR THK7.5MIL STRW LTX POLYMER

## (undated) DEVICE — (D)BNDG ADHESIVE FABRIC 3/4X3 -- DISC BY MFR USE ITEM 357960

## (undated) DEVICE — SUTURE VCRL SZ 3-0 L27IN ABSRB VLT L26MM SH 1/2 CIR J316H

## (undated) DEVICE — CUFF BLD PRESSURE MONITORING LNG AD 23-33 CM 1 TUBE MY CUF

## (undated) DEVICE — KENDALL SCD EXPRESS SLEEVES, KNEE LENGTH, MEDIUM: Brand: KENDALL SCD

## (undated) DEVICE — TROCAR ENDOSCP L100MM DIA12MM STBL SL BLDELSS ENDOPATH XCEL

## (undated) DEVICE — SYR 10ML CTRL LR LCK NSAF LF --

## (undated) DEVICE — APPLICATOR BNDG 1MM ADH PREMIERPRO EXOFIN

## (undated) DEVICE — TRUE CONTENT TO BE POPULATED AS PART OF REBRANDING: Brand: ARGYLE

## (undated) DEVICE — UNIVERSAL FIXATION CANNULA: Brand: VERSAONE

## (undated) DEVICE — STAPLE INT WHT BLU G GRN BLK REINF FOR ENDOPATH ECHELON FLX

## (undated) DEVICE — PACK PROCEDURE SURG LAPAROSCOPY 17X7 MM BRTRC PRIMUS

## (undated) DEVICE — MIRAGE SWIFT II PILLOW LGE: Brand: MIRAGE SWIFT II

## (undated) DEVICE — SUT SLK 2-0SH 30IN BLK --

## (undated) DEVICE — DISPOSABLE SUCTION/IRRIGATOR TUBE SET WITH TIP: Brand: AHTO

## (undated) DEVICE — INTENDED FOR TISSUE SEPARATION, AND OTHER PROCEDURES THAT REQUIRE A SHARP SURGICAL BLADE TO PUNCTURE OR CUT.: Brand: BARD-PARKER ® CARBON RIB-BACK BLADES

## (undated) DEVICE — LIGHT HANDLE: Brand: DEVON

## (undated) DEVICE — NEEDLE SPNL 22GA L3.5IN BLK HUB S STL REG WALL FIT STYL W/

## (undated) DEVICE — RELOAD STPL L60MM H1.5-3.6MM REG TISS BLU GRIPPING SURF B

## (undated) DEVICE — SOL IRR STRL H2O 500ML STRL --

## (undated) DEVICE — KIT CATH OD16FR 5ML BLLN SIL URIN INDWL STR TIP INF CTRL

## (undated) DEVICE — RELOAD STPL L60MM H1-2.6MM MESENTERY THN TISS WHT 6 ROW

## (undated) DEVICE — SUTURE VCRL SZ 2-0 L54IN ABSRB VLT W/O NDL POLYGLACTIN 910 J618H

## (undated) DEVICE — SUTURE MCRYL SZ 4-0 L27IN ABSRB UD L24MM PS-1 3/8 CIR PRIM Y935H

## (undated) DEVICE — TROCAR ENDOSCP SHFT L100MM DIA12MM INTEGR STBL ENDOPATH

## (undated) DEVICE — AVANOS* SHORT BEVEL NEEDLE: Brand: AVANOS

## (undated) DEVICE — VISUALIZATION SYSTEM: Brand: CLEARIFY

## (undated) DEVICE — SHEAR HARMONIC ACET 5MMX36CM -- ACE PLUS

## (undated) DEVICE — REM POLYHESIVE ADULT PATIENT RETURN ELECTRODE: Brand: VALLEYLAB

## (undated) DEVICE — TROCAR LAP L100MM DIA5MM BLDELSS W/ STBL SL ENDOPATH XCEL

## (undated) DEVICE — TRAY SUPP STD NO DRUG W EXTENSION SET

## (undated) DEVICE — PREP SKN CHLORHEX GLUC 8OZ --

## (undated) DEVICE — DRAPE TWL SURG 16X26IN BLU ORB04] ALLCARE INC]

## (undated) DEVICE — SHEARS: Brand: ENDO SHEARS